# Patient Record
Sex: MALE | Race: WHITE | NOT HISPANIC OR LATINO
[De-identification: names, ages, dates, MRNs, and addresses within clinical notes are randomized per-mention and may not be internally consistent; named-entity substitution may affect disease eponyms.]

---

## 2024-02-20 PROBLEM — Z00.00 ENCOUNTER FOR PREVENTIVE HEALTH EXAMINATION: Status: ACTIVE | Noted: 2024-02-20

## 2024-02-22 ENCOUNTER — APPOINTMENT (OUTPATIENT)
Dept: UROLOGY | Facility: CLINIC | Age: 89
End: 2024-02-22

## 2024-06-10 ENCOUNTER — APPOINTMENT (OUTPATIENT)
Dept: ORTHOPEDIC SURGERY | Facility: CLINIC | Age: 89
End: 2024-06-10

## 2024-08-21 ENCOUNTER — APPOINTMENT (OUTPATIENT)
Dept: NEUROLOGY | Facility: HOSPITAL | Age: 89
End: 2024-08-21

## 2024-08-21 ENCOUNTER — RESULT REVIEW (OUTPATIENT)
Age: 89
End: 2024-08-21

## 2024-08-21 ENCOUNTER — INPATIENT (INPATIENT)
Facility: HOSPITAL | Age: 89
LOS: 15 days | Discharge: HOSPICE | DRG: 66 | End: 2024-09-06
Attending: INTERNAL MEDICINE | Admitting: PSYCHIATRY & NEUROLOGY
Payer: MEDICARE

## 2024-08-21 VITALS
OXYGEN SATURATION: 100 % | DIASTOLIC BLOOD PRESSURE: 74 MMHG | HEART RATE: 90 BPM | SYSTOLIC BLOOD PRESSURE: 153 MMHG | RESPIRATION RATE: 45 BRPM

## 2024-08-21 DIAGNOSIS — I63.9 CEREBRAL INFARCTION, UNSPECIFIED: ICD-10-CM

## 2024-08-21 LAB
A1C WITH ESTIMATED AVERAGE GLUCOSE RESULT: 5.4 % — SIGNIFICANT CHANGE UP (ref 4–5.6)
ANION GAP SERPL CALC-SCNC: 13 MMOL/L — SIGNIFICANT CHANGE UP (ref 5–17)
APTT BLD: 42.8 SEC — HIGH (ref 24.5–35.6)
BLD GP AB SCN SERPL QL: SIGNIFICANT CHANGE UP
BUN SERPL-MCNC: 28.3 MG/DL — HIGH (ref 8–20)
CALCIUM SERPL-MCNC: 8.5 MG/DL — SIGNIFICANT CHANGE UP (ref 8.4–10.5)
CHLORIDE SERPL-SCNC: 105 MMOL/L — SIGNIFICANT CHANGE UP (ref 96–108)
CHOLEST SERPL-MCNC: 175 MG/DL — SIGNIFICANT CHANGE UP
CK SERPL-CCNC: 75 U/L — SIGNIFICANT CHANGE UP (ref 30–200)
CO2 SERPL-SCNC: 19 MMOL/L — LOW (ref 22–29)
CREAT SERPL-MCNC: 1.64 MG/DL — HIGH (ref 0.5–1.3)
EGFR: 39 ML/MIN/1.73M2 — LOW
ESTIMATED AVERAGE GLUCOSE: 108 MG/DL — SIGNIFICANT CHANGE UP (ref 68–114)
GAS PNL BLDA: SIGNIFICANT CHANGE UP
GLUCOSE BLDC GLUCOMTR-MCNC: 157 MG/DL — HIGH (ref 70–99)
GLUCOSE SERPL-MCNC: 167 MG/DL — HIGH (ref 70–99)
HCT VFR BLD CALC: 29.2 % — LOW (ref 39–50)
HDLC SERPL-MCNC: 49 MG/DL — SIGNIFICANT CHANGE UP
HGB BLD-MCNC: 9.5 G/DL — LOW (ref 13–17)
INR BLD: 1.03 RATIO — SIGNIFICANT CHANGE UP (ref 0.85–1.18)
LIPID PNL WITH DIRECT LDL SERPL: 109 MG/DL — HIGH
MAGNESIUM SERPL-MCNC: 2 MG/DL — SIGNIFICANT CHANGE UP (ref 1.6–2.6)
MCHC RBC-ENTMCNC: 27.8 PG — SIGNIFICANT CHANGE UP (ref 27–34)
MCHC RBC-ENTMCNC: 32.5 GM/DL — SIGNIFICANT CHANGE UP (ref 32–36)
MCV RBC AUTO: 85.4 FL — SIGNIFICANT CHANGE UP (ref 80–100)
NON HDL CHOLESTEROL: 126 MG/DL — SIGNIFICANT CHANGE UP
NT-PROBNP SERPL-SCNC: 3080 PG/ML — HIGH (ref 0–300)
PHOSPHATE SERPL-MCNC: 4 MG/DL — SIGNIFICANT CHANGE UP (ref 2.4–4.7)
PLATELET # BLD AUTO: 179 K/UL — SIGNIFICANT CHANGE UP (ref 150–400)
POTASSIUM SERPL-MCNC: 4 MMOL/L — SIGNIFICANT CHANGE UP (ref 3.5–5.3)
POTASSIUM SERPL-SCNC: 4 MMOL/L — SIGNIFICANT CHANGE UP (ref 3.5–5.3)
PROTHROM AB SERPL-ACNC: 11.4 SEC — SIGNIFICANT CHANGE UP (ref 9.5–13)
RBC # BLD: 3.42 M/UL — LOW (ref 4.2–5.8)
RBC # FLD: 14.6 % — HIGH (ref 10.3–14.5)
SODIUM SERPL-SCNC: 137 MMOL/L — SIGNIFICANT CHANGE UP (ref 135–145)
TRIGL SERPL-MCNC: 87 MG/DL — SIGNIFICANT CHANGE UP
TROPONIN T, HIGH SENSITIVITY RESULT: 46 NG/L — SIGNIFICANT CHANGE UP (ref 0–51)
TSH SERPL-MCNC: 1.01 UIU/ML — SIGNIFICANT CHANGE UP (ref 0.27–4.2)
WBC # BLD: 12.92 K/UL — HIGH (ref 3.8–10.5)
WBC # FLD AUTO: 12.92 K/UL — HIGH (ref 3.8–10.5)

## 2024-08-21 PROCEDURE — 70460 CT HEAD/BRAIN W/DYE: CPT | Mod: 26

## 2024-08-21 PROCEDURE — 93010 ELECTROCARDIOGRAM REPORT: CPT

## 2024-08-21 PROCEDURE — 61645 PERQ ART M-THROMBECT &/NFS: CPT | Mod: LT

## 2024-08-21 PROCEDURE — 76377 3D RENDER W/INTRP POSTPROCES: CPT | Mod: 26

## 2024-08-21 PROCEDURE — 71045 X-RAY EXAM CHEST 1 VIEW: CPT | Mod: 26

## 2024-08-21 PROCEDURE — 71045 X-RAY EXAM CHEST 1 VIEW: CPT | Mod: 26,77

## 2024-08-21 PROCEDURE — 99291 CRITICAL CARE FIRST HOUR: CPT | Mod: FS

## 2024-08-21 PROCEDURE — 75710 ARTERY X-RAYS ARM/LEG: CPT | Mod: 26,59

## 2024-08-21 PROCEDURE — 70450 CT HEAD/BRAIN W/O DYE: CPT | Mod: 26,76

## 2024-08-21 PROCEDURE — 93306 TTE W/DOPPLER COMPLETE: CPT | Mod: 26

## 2024-08-21 PROCEDURE — 99232 SBSQ HOSP IP/OBS MODERATE 35: CPT

## 2024-08-21 RX ORDER — NICARDIPINE HCL 20 MG
5 CAPSULE ORAL
Qty: 40 | Refills: 0 | Status: DISCONTINUED | OUTPATIENT
Start: 2024-08-21 | End: 2024-08-22

## 2024-08-21 RX ORDER — AMLODIPINE BESYLATE 10 MG/1
5 TABLET ORAL DAILY
Refills: 0 | Status: DISCONTINUED | OUTPATIENT
Start: 2024-08-21 | End: 2024-08-22

## 2024-08-21 RX ORDER — CHLORHEXIDINE GLUCONATE 40 MG/ML
1 SOLUTION TOPICAL DAILY
Refills: 0 | Status: DISCONTINUED | OUTPATIENT
Start: 2024-08-21 | End: 2024-09-06

## 2024-08-21 RX ORDER — POLYETHYLENE GLYCOL 3350 17 G/17G
17 POWDER, FOR SOLUTION ORAL DAILY
Refills: 0 | Status: DISCONTINUED | OUTPATIENT
Start: 2024-08-21 | End: 2024-09-02

## 2024-08-21 RX ORDER — CHLORHEXIDINE GLUCONATE 40 MG/ML
15 SOLUTION TOPICAL EVERY 12 HOURS
Refills: 0 | Status: DISCONTINUED | OUTPATIENT
Start: 2024-08-21 | End: 2024-08-30

## 2024-08-21 RX ORDER — PANTOPRAZOLE SODIUM 40 MG
40 TABLET, DELAYED RELEASE (ENTERIC COATED) ORAL EVERY 12 HOURS
Refills: 0 | Status: DISCONTINUED | OUTPATIENT
Start: 2024-08-21 | End: 2024-09-06

## 2024-08-21 RX ORDER — SENNA 187 MG
2 TABLET ORAL AT BEDTIME
Refills: 0 | Status: DISCONTINUED | OUTPATIENT
Start: 2024-08-21 | End: 2024-09-02

## 2024-08-21 RX ORDER — TAMSULOSIN HYDROCHLORIDE 0.4 MG/1
0.4 CAPSULE ORAL AT BEDTIME
Refills: 0 | Status: DISCONTINUED | OUTPATIENT
Start: 2024-08-21 | End: 2024-09-02

## 2024-08-21 RX ORDER — SODIUM CHLORIDE 9 MG/ML
1000 INJECTION INTRAMUSCULAR; INTRAVENOUS; SUBCUTANEOUS
Refills: 0 | Status: DISCONTINUED | OUTPATIENT
Start: 2024-08-21 | End: 2024-08-21

## 2024-08-21 RX ORDER — PROPOFOL 10 MG/ML
10 INJECTION, EMULSION INTRAVENOUS
Qty: 1000 | Refills: 0 | Status: DISCONTINUED | OUTPATIENT
Start: 2024-08-21 | End: 2024-08-21

## 2024-08-21 RX ORDER — SODIUM CHLORIDE 9 MG/ML
250 INJECTION INTRAMUSCULAR; INTRAVENOUS; SUBCUTANEOUS ONCE
Refills: 0 | Status: DISCONTINUED | OUTPATIENT
Start: 2024-08-21 | End: 2024-08-21

## 2024-08-21 RX ORDER — SODIUM CHLORIDE 9 MG/ML
500 INJECTION INTRAMUSCULAR; INTRAVENOUS; SUBCUTANEOUS ONCE
Refills: 0 | Status: COMPLETED | OUTPATIENT
Start: 2024-08-21 | End: 2024-08-21

## 2024-08-21 RX ORDER — DEXMEDETOMIDINE HYDROCHLORIDE IN 0.9% SODIUM CHLORIDE 4 UG/ML
0.2 INJECTION INTRAVENOUS
Qty: 200 | Refills: 0 | Status: DISCONTINUED | OUTPATIENT
Start: 2024-08-21 | End: 2024-08-22

## 2024-08-21 RX ADMIN — SODIUM CHLORIDE 75 MILLILITER(S): 9 INJECTION INTRAMUSCULAR; INTRAVENOUS; SUBCUTANEOUS at 17:36

## 2024-08-21 RX ADMIN — CHLORHEXIDINE GLUCONATE 1 APPLICATION(S): 40 SOLUTION TOPICAL at 17:21

## 2024-08-21 RX ADMIN — Medication 25 MG/HR: at 14:30

## 2024-08-21 RX ADMIN — SODIUM CHLORIDE 75 MILLILITER(S): 9 INJECTION INTRAMUSCULAR; INTRAVENOUS; SUBCUTANEOUS at 04:50

## 2024-08-21 RX ADMIN — TAMSULOSIN HYDROCHLORIDE 0.4 MILLIGRAM(S): 0.4 CAPSULE ORAL at 21:53

## 2024-08-21 RX ADMIN — CHLORHEXIDINE GLUCONATE 15 MILLILITER(S): 40 SOLUTION TOPICAL at 06:04

## 2024-08-21 RX ADMIN — SODIUM CHLORIDE 500 MILLILITER(S): 9 INJECTION INTRAMUSCULAR; INTRAVENOUS; SUBCUTANEOUS at 06:35

## 2024-08-21 RX ADMIN — Medication 2 TABLET(S): at 21:53

## 2024-08-21 RX ADMIN — PROPOFOL 4.08 MICROGRAM(S)/KG/MIN: 10 INJECTION, EMULSION INTRAVENOUS at 06:03

## 2024-08-21 RX ADMIN — SODIUM CHLORIDE 75 MILLILITER(S): 9 INJECTION INTRAMUSCULAR; INTRAVENOUS; SUBCUTANEOUS at 17:37

## 2024-08-21 RX ADMIN — Medication 25 MG/HR: at 21:11

## 2024-08-21 RX ADMIN — Medication 25 MG/HR: at 06:03

## 2024-08-21 RX ADMIN — DEXMEDETOMIDINE HYDROCHLORIDE IN 0.9% SODIUM CHLORIDE 3.4 MICROGRAM(S)/KG/HR: 4 INJECTION INTRAVENOUS at 22:23

## 2024-08-21 RX ADMIN — CHLORHEXIDINE GLUCONATE 15 MILLILITER(S): 40 SOLUTION TOPICAL at 17:20

## 2024-08-21 RX ADMIN — Medication 40 MILLIGRAM(S): at 17:21

## 2024-08-21 NOTE — H&P ADULT - CRITICAL CARE ATTENDING COMMENT
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   Acute resp failure due to neurologic injury.  AS s/p TAVR, Afib (diagnosed in 2022, refused AC; EF at that time reported as 50%), LBBB, 1st degr AV block; HTN, HLD, CKD.  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  H/o Stage 3 colon cancer in 2009, had surgery; hemorrhoids, h/o LGIB.  Reportedly, with declining mental and physical condition over the last 2 years.    Plan:  - post thrombectomy neuro checks  - MRI brain  - stroke core measures  - hold any anti-thrombotics for now  - SBP goal 100-120 for now; obtain EKG, repeat trops, pro-BNP; Cardiology eval  - maintain Osats>92%, vent support  - monitor e-lytes, I/Os, re-send UA  - maintain -180, ISS  - DVT prophylaxis: SCDs only for now  - GOC discussion with family, full code      Pt is critically ill and at high risk of rapid deterioration/death due to abovementioned conditions.   Still requires critical care interventions - ongoing frequent evaluations, interventions and management adjustment by the Attending and ICU team, - and included review of relevant history, clinical examination, review of data and images, discussion of treatment with the multidisciplinary team and any consultants involved in this patient’s care as well as family discussion.

## 2024-08-21 NOTE — H&P ADULT - ASSESSMENT
89 yo M with PMH of colon cancer, htn, ?on Aspirin, p/w presents from [facility] with initial complaints of nausea/vomitting and dizziness s/p dinner, subsequently found to be altered followed by unresponsivenss and decorticate positoning. patient intubated, CTA concerning for basilar artery occlusion, patient  ransferred to [facility] for further medical care.     Plan:   PLAN:  Neuro:  - Q1 hour Neuro checks, Q1 hour Vitals  - Per [facility] chart, recommendations made against TNK administration by Dr. Robert (tele neurostroke)   - patient will be going for thrombectomy for evacuation of basilar occlusion   - CT head in AM   - Stat CT head if neurological decline.   - Propofol @ 20  for sedation.   - MRI  w/ and w/o given cancer history .  CV:  - SBP Goal 100-140   - Stroke core measure   - TTE   - LED   Pulm:  - intubated, cxr and abg to confirm ideal mechanical ventilation and confirm ETT placment.   GI:  - NPO   - malignancy w/u - CT CAP w/ contrast.   Gu:  - Thomas   - I&O Q1 hour  - NS@75 while NPO   - Monitor Electrolytes & Renal Function    Heme:  - Monitor H&H  - Chemical DVT prophylaxis: * Chemical DVT prophylaxis is contraindicated due to risk of bleeding  - Mechanical DVT Prophylaxis: Maintain B/L LE sequential compression devices  ID:  - Monitor WBC and Temperature	  Endo  - Monitor BGL, maintain <180  - Pending A1C, TSH

## 2024-08-21 NOTE — DISCHARGE NOTE NURSING/CASE MANAGEMENT/SOCIAL WORK - NSDCPEFALRISK_GEN_ALL_CORE
For information on Fall & Injury Prevention, visit: [email or web address] OR  [email or web address] OR  https://www.cdc.gov/steadi/patient.html

## 2024-08-21 NOTE — H&P ADULT - HISTORY OF PRESENT ILLNESS
89 Yo M with PMH of HTN, colon cancer, enlarged prostate transfer from [facility] from [facility] for altered mental status. LKW 7PM, Per chart, Patient initially complained of  Nausea,  vomiting and dizziness s/p dinner, later  found to be unresponsive and decorticate posturing in [facility]. 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 Patient   transferred to [facility] for NeuroIR intervention.     NIHSS performed at20-Aug-2024 22:30 @ [facility]     NIH 1a. Level of Consciousness(3) Responds only with reflex motor or autonomic effects or totally unresponsive, flaccid, and areflex   NIH 1b. LOC Questions(2) Answers neither question correctly   NIH 1c. LOC Commands(2) Performs neither task correctly   NIH 2. Best Gaze(2) Forced deviation, or total gaze paresis not overcome by the oculocephalic maneuver   NIH 3. Visual(3) Bilateral hemianopia (blind including cortical blindness)   NIH 4. Facial Palsy(0) Normal symmetrical movements   NIH 5a. Motor Arm, Left(3) No effort against gravity; limb falls   NIH 5b. Motor Arm, Right(3) No effort against gravity; limb falls   NIH 6a. Motor Leg, Left(3) No effort against gravity; leg falls to bed immediately   NIH 6b. Motor Leg, Right(3) No effort against gravity; leg falls to bed immediately   NIH 7. Limb Ataxia(0) Absent   NIH 8. Sensory(2) Severe to total sensory loss; patient is not aware of being touched in the face, arm, and leg   NIH 9. Best Language(3) Mute, global aphasia; no usable speech or auditory comprehension   NIH 10. Dysarthria(UN) Intubated or other physical barrier   NIH 11. Extinction and Inattention (formally neglect)(2) Profound darren-inattention/extinction more than 1 modality (1) Visual, tactile, auditory, spatial, or personal inattention or extinction to bilateral simultaneous stimulation in one of the sensory modalities    NIH Stroke Scale: Total31 30    NIHSS [id number] (severe stroke)

## 2024-08-21 NOTE — H&P ADULT - NSHPSOCIALHISTORY_GEN_ALL_CORE
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 Per son- who is a neurosurgeon, patient has mild memory loss but overall highly functioning. patient is a former smoker.

## 2024-08-21 NOTE — PATIENT PROFILE ADULT - FALL HARM RISK - HARM RISK INTERVENTIONS

## 2024-08-21 NOTE — CHART NOTE - NSCHARTNOTEFT_GEN_A_CORE
Neurointerventional Surgery Post Procedure Note    Procedure: Selective Cerebral Angiography     Pre- Procedure Diagnosis: basilar occlusion  Post- Procedure Diagnosis: basilar occlusion s/p angioplasty of L vert and thrombectomy of basilar artery, TICI 2B after 1 pass     : Dr. Olmos  Physician Assistant: Rachele Helton  Nurse: Bao Gómez   Anesthesiologist: Dr. Peña, VIRGILIO       Radiology Tech: Owen Shah  Fellow: Mike Prakash    Sheath:  7  Cymraes Sheath    I/Os: estimated blood loss less than 20cc,  IV fluids 700cc, Urine output 400cc, Contrast: Omnipaque 240  65 cc    Vitals:  BP  110/56   HR 85  Spo2  99%    Preliminary Report:  Under a 7 Cymraes short sheath via the left wrist under general anesthesia via left vertebral artery, a selective cerebral angiography  was performed and reveals basilar occlusion s/p angioplasty of L vert and thrombectomy of basilar artery, TICI 2B after 1 pass. ( Official note to follow).    Patient tolerated procedure well.  Patient remains hemodynamically stable, no change in neurological status compared to baseline.  Results were discussed with patient, patient's family and Neurosurgery.  Left wrist sheath  was discontinued using radial band with 10cc of air at 0420.   No active bleeding, no hematoma, no ecchymosis.     Patient transferred to neuro ICU in stable condition.

## 2024-08-21 NOTE — CONSULT NOTE ADULT - SUBJECTIVE AND OBJECTIVE BOX
Preliminary note, official note pending attending review/signature.                               [facility] Stroke Team  CC: nausea vomiting, unresponsiveness   HPI:  89 Yo M with PMH of HTN, colon cancer, and enlarged prostate was transfered from [facility] to [facility] on 8/21. Per chart patient initially complained of nausea,  vomiting and dizziness s/p dinner on 8/20. Was brought to [place] on 8/20 and was later found to be unresponsive and decorticate posturing in [place] ED. Patient intubated in ED. LKW was 19:00 on 8/20. 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 Patient  transferred to [facility] for NeuroIR intervention and taken for mechanical thrombectomy on 8/21. Stroke team called for consult.     PAST MEDICAL & SURGICAL HISTORY:  Colonic cancer  HTN (hypertension)    MEDICATIONS  (STANDING):  chlorhexidine 0.12% Liquid 15 milliLiter(s) Oral Mucosa every 12 hours  chlorhexidine 2% Cloths 1 Application(s) Topical daily  niCARdipine Infusion 5 mG/Hr (25 mL/Hr) IV Continuous <Continuous>  propofol Infusion 10 MICROgram(s)/kG/Min (4.08 mL/Hr) IV Continuous <Continuous>  sodium chloride 0.9% Bolus 500 milliLiter(s) IV Bolus once  sodium chloride 0.9% Bolus 250 milliLiter(s) IV Bolus once  sodium chloride 0.9%. 1000 milliLiter(s) (75 mL/Hr) IV Continuous <Continuous>    Allergies-Allergy Status Unknown    SOCIAL HISTORY:  no tob,   no alcohol   no drugs    FAMILY HISTORY:    ROS: 14 point ROS negative other than what is present in HPI or below    Vital Signs Last 24 Hrs  T(C): --  T(F): --  HR: 75 (21 Aug 2024 06:50) (68 - 99)  BP: 113/47 (21 Aug 2024 06:50) (97/50 - 153/74)  BP(mean): 64 (21 Aug 2024 06:50) (64 - 99)  RR: 13 (21 Aug 2024 06:50) (13 - 45)  SpO2: 100% (21 Aug 2024 06:50) (100% - 100%)      PENDING  Physical Exam:  General: No acute distress.   Detailed Neurologic Exam:    Mental status: The patient is awake and alert and has normal attention span.  The patient is fully oriented in 3 spheres. The patient is oriented to current events. The patient is able to name objects, follow commands, repeat sentences.    Cranial nerves: Pupils equal and react symmetrically to light. There is no visual field deficit to confrontation. Extraocular motion is full with no nystagmus. There is no ptosis. Facial sensation is intact. Facial musculature is symmetric. Palate elevates symmetrically. Tongue is midline.    Motor: There is normal bulk and tone.  There is no tremor.  Strength is 5/5 in the right arm and leg.   Strength is 5/5 in the left arm and leg.    Sensation: Intact to light touch and pin in 4 extremities    Cerebellar: There is no dysmetria on finger to nose testing.    Gait : deferred    RUST SS:  DATE: 8/21/24  TIME:  1A: Level of consciousness (0-3):   1B: Questions (0-2):   1C: Commands (0-2):   2: Gaze (0-2):   3: Visual fields (0-3):   4: Facial palsy (0-3):   MOTOR:  5A: Left arm motor drift (0-4):   5B: Right arm motor drift (0-4):   6A: Left leg motor drift (0-4):   6B: Right leg motor drift (0-4):   7: Limb ataxia (0-2):   SENSORY:  8: Sensation (0-2):   SPEECH:  9: Language (0-3):   10: Dysarthria (0-2):   EXTINCTION:  11: Extinction/inattention (0-2):     TOTAL SCORE:     prehospital mRS=      LABS:                         9.5    12.92 )-----------( 179      ( 21 Aug 2024 05:28 )             29.2       08-21    137  |  105  |  28.3<H>  ----------------------------<  167<H>  4.0   |  19.0<L>  |  1.64<H>    Ca    8.5      21 Aug 2024 05:28        PT/INR - ( 21 Aug 2024 05:28 )   PT: 11.4 sec;   INR: 1.03 ratio         PTT - ( 21 Aug 2024 05:28 )  PTT:42.8 sec    08-21 Chol 175  HDL 49 Trig 87    A1C: 5.4    RADIOLOGY & ADDITIONAL STUDIES (independently reviewed unless otherwise noted):  Head CT 8/20:  IMPRESSION:  No evidence of acute intracranial hemorrhage, midline shift or CT evidence  of acute territorial infarct.    CTA Head and Neck and CTP:   IMPRESSION:  CT PERFUSION: Perfusion imaging predicts a core infarct of 0 cc within the  bilateral cerebral hemispheres.? Based on the perfusion mismatch, there is a  predicted volume of 61 cc penumbra of tissue at risk.    These findings are nonspecific and may be artifactual in nature. MRI  suggested for further evaluation.    CTA COW: Lack of opacification of the mid to distal basilar artery, this  may be due to poor contrast opacification, however, suspicious for  high-grade stenosis/occlusion.    CTA NECK: Calcified plaque. Patent, ECAs, ICAs, no hemodynamically  significant stenosis at ICA origins by NASCET criteria.    Lack of opacification of the proximal left vertebral artery, this may be due  to poor contrast opacification, however, high-grade stenosis/occlusion is  not excluded.    Endotracheal tube with its distal tip above the level of the judson.    Nonspecific thickening of the esophagus.         Preliminary note, official note pending attending review/signature.                               [facility] Stroke Team  CC: nausea vomiting, unresponsiveness   HPI:  89 Yo M with PMH of HTN, colon cancer, and enlarged prostate was transferred from [facility] to [facility] on 8/21. Per chart patient initially complained of nausea,  vomiting and dizziness s/p dinner on 8/20. Was brought to [place] on 8/20 and was found to be unresponsive, apneic breathing, and decorticate posturing in [place] ED. Patient intubated in ED. LKW was 20:00 on 8/20. As per chart review son stated that about 4 months ago patient had a syncopal episode and received TPA and had some hematuria, but eventual workup showed no evidence of stroke. 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 Patient  transferred to [facility] for NeuroIR intervention and taken for mechanical thrombectomy on 8/21. Stroke team called for consult.     PAST MEDICAL & SURGICAL HISTORY:  Colonic cancer  HTN (hypertension)    MEDICATIONS  (STANDING):  chlorhexidine 0.12% Liquid 15 milliLiter(s) Oral Mucosa every 12 hours  chlorhexidine 2% Cloths 1 Application(s) Topical daily  niCARdipine Infusion 5 mG/Hr (25 mL/Hr) IV Continuous <Continuous>  propofol Infusion 10 MICROgram(s)/kG/Min (4.08 mL/Hr) IV Continuous <Continuous>  sodium chloride 0.9% Bolus 500 milliLiter(s) IV Bolus once  sodium chloride 0.9% Bolus 250 milliLiter(s) IV Bolus once  sodium chloride 0.9%. 1000 milliLiter(s) (75 mL/Hr) IV Continuous <Continuous>    Allergies-Allergy Status Unknown    SOCIAL HISTORY:  no tob,   no alcohol   no drugs    FAMILY HISTORY:    ROS: 14 point ROS negative other than what is present in HPI or below    Vital Signs Last 24 Hrs  T(C): --  T(F): --  HR: 75 (21 Aug 2024 06:50) (68 - 99)  BP: 113/47 (21 Aug 2024 06:50) (97/50 - 153/74)  BP(mean): 64 (21 Aug 2024 06:50) (64 - 99)  RR: 13 (21 Aug 2024 06:50) (13 - 45)  SpO2: 100% (21 Aug 2024 06:50) (100% - 100%)    Physical Exam:  General: Patient intubated. Family at bedside.   Detailed Neurologic Exam:    Mental status: The patient not awake and keeps eyes closed throughout exam. Not oriented to time, self, or place. No verbal output.     Cranial nerves: Pupils pinpoint but minimally reactive to light. No facial asymmetry. Right sided neglect.      Motor: There is normal bulk and tone.  There is no tremor.  LUE: antigravity for 10 seconds. 4/5 strength   LLE: movement within bed and withdraws to noxious stimuli   RUE: no movement   RLE: movement within bed and withdraws to noxious stimuli       Sensation: intact to noxious stimuli in RLE, LUE and LLE. no sensation to noxious stimuli in RUE.     Cerebellar: There is no dysmetria on finger to nose testing.    Gait : deferred    NIH SS:  DATE: 8/21/24  TIME:  1A: Level of consciousness (0-3):   1B: Questions (0-2):   1C: Commands (0-2):   2: Gaze (0-2):   3: Visual fields (0-3):   4: Facial palsy (0-3):   MOTOR:  5A: Left arm motor drift (0-4):   5B: Right arm motor drift (0-4):   6A: Left leg motor drift (0-4):   6B: Right leg motor drift (0-4):   7: Limb ataxia (0-2):   SENSORY:  8: Sensation (0-2):   SPEECH:  9: Language (0-3):   10: Dysarthria (0-2):   EXTINCTION:  11: Extinction/inattention (0-2):     TOTAL SCORE:     prehospital mRS=      LABS:                         9.5    12.92 )-----------( 179      ( 21 Aug 2024 05:28 )             29.2       08-21    137  |  105  |  28.3<H>  ----------------------------<  167<H>  4.0   |  19.0<L>  |  1.64<H>    Ca    8.5      21 Aug 2024 05:28        PT/INR - ( 21 Aug 2024 05:28 )   PT: 11.4 sec;   INR: 1.03 ratio         PTT - ( 21 Aug 2024 05:28 )  PTT:42.8 sec    08-21 Chol 175  HDL 49 Trig 87    A1C: 5.4    RADIOLOGY & ADDITIONAL STUDIES (independently reviewed unless otherwise noted):  CT Head No Cont (08.21.24 @ 08:09)   IMPRESSION:  Increased attenuation within the mikaela, new compared to prior imaging.   Finding likely compatible with a developing hemorrhagic conversion of   patient's known infarct and/or contrast staining. Recommend close   interval imaging for further evaluation.    Head CT 8/20:  IMPRESSION:  No evidence of acute intracranial hemorrhage, midline shift or CT evidence  of acute territorial infarct.    CTA Head and Neck and CTP:   IMPRESSION:  CT PERFUSION: Perfusion imaging predicts a core infarct of 0 cc within the  bilateral cerebral hemispheres.? Based on the perfusion mismatch, there is a  predicted volume of 61 cc penumbra of tissue at risk.    These findings are nonspecific and may be artifactual in nature. MRI  suggested for further evaluation.    CTA COW: Lack of opacification of the mid to distal basilar artery, this  may be due to poor contrast opacification, however, suspicious for  high-grade stenosis/occlusion.    CTA NECK: Calcified plaque. Patent, ECAs, ICAs, no hemodynamically  significant stenosis at ICA origins by NASCET criteria.    Lack of opacification of the proximal left vertebral artery, this may be due  to poor contrast opacification, however, high-grade stenosis/occlusion is  not excluded.    Endotracheal tube with its distal tip above the level of the judson.    Nonspecific thickening of the esophagus.         Preliminary note, official note pending attending review/signature.                               [facility] Stroke Team  CC: nausea vomiting, unresponsiveness   HPI:  89 Yo M with PMH of HTN, colon cancer, and enlarged prostate was transferred from [facility] to [facility] on 8/21. Per chart patient initially complained of nausea,  vomiting and dizziness s/p dinner on 8/20. Was brought to [place] on 8/20 and was found to be unresponsive, apneic breathing, and decorticate posturing in [place] ED. Patient intubated in ED. LKW was 20:00 on 8/20. As per chart review son stated that about 4 months ago patient had a syncopal episode and received TPA and had some hematuria, but eventual workup showed no evidence of stroke. 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 Patient  transferred to [facility] for NeuroIR intervention and taken for mechanical thrombectomy on 8/21. Stroke team called for consult.     PAST MEDICAL & SURGICAL HISTORY:  Colonic cancer  HTN (hypertension)    MEDICATIONS  (STANDING):  chlorhexidine 0.12% Liquid 15 milliLiter(s) Oral Mucosa every 12 hours  chlorhexidine 2% Cloths 1 Application(s) Topical daily  niCARdipine Infusion 5 mG/Hr (25 mL/Hr) IV Continuous <Continuous>  propofol Infusion 10 MICROgram(s)/kG/Min (4.08 mL/Hr) IV Continuous <Continuous>  sodium chloride 0.9% Bolus 500 milliLiter(s) IV Bolus once  sodium chloride 0.9% Bolus 250 milliLiter(s) IV Bolus once  sodium chloride 0.9%. 1000 milliLiter(s) (75 mL/Hr) IV Continuous <Continuous>    Allergies-Allergy Status Unknown    SOCIAL HISTORY:  no tob,   no alcohol   no drugs    FAMILY HISTORY:    ROS: 14 point ROS negative other than what is present in HPI or below    Vital Signs Last 24 Hrs  T(C): --  T(F): --  HR: 75 (21 Aug 2024 06:50) (68 - 99)  BP: 113/47 (21 Aug 2024 06:50) (97/50 - 153/74)  BP(mean): 64 (21 Aug 2024 06:50) (64 - 99)  RR: 13 (21 Aug 2024 06:50) (13 - 45)  SpO2: 100% (21 Aug 2024 06:50) (100% - 100%)    Physical Exam:  General: Patient intubated. Family at bedside.   Detailed Neurologic Exam:    Mental status: The patient not awake, however opens eyes to voice. Not oriented to time, self, or place. No verbal output. does not follow commands    Cranial nerves: Pupils pinpoint but minimally reactive to light. No facial asymmetry. Right sided neglect. Right gaze palsy. Does not blink to threat in right peripheral field.     Motor: There is normal bulk and tone.  There is no tremor.  LUE: antigravity for 10 seconds. 4/5 strength   LLE: movement within bed and withdraws to noxious stimuli   RUE: no movement   RLE: movement within bed and withdraws to noxious stimuli       Sensation: intact to noxious stimuli in RLE, LUE and LLE. no sensation to noxious stimuli in RUE.     Cerebellar: There is no dysmetria on finger to nose testing.    Gait : deferred    NIH SS:  DATE: 8/21/24  TIME: 10:30 am   1A: Level of consciousness (0-3): 2  1B: Questions (0-2): 2  1C: Commands (0-2): 2  2: Gaze (0-2): 1  3: Visual fields (0-3): 1  4: Facial palsy (0-3): 0  MOTOR:  5A: Left arm motor drift (0-4): 0  5B: Right arm motor drift (0-4): 4  6A: Left leg motor drift (0-4): 2  6B: Right leg motor drift (0-4): 2  7: Limb ataxia (0-2): 0  SENSORY:  8: Sensation (0-2): 2  SPEECH:  9: Language (0-3): 3  10: Dysarthria (0-2): 2  EXTINCTION:  11: Extinction/inattention (0-2): 1    TOTAL SCORE: 22    prehospital mRS= unknown at this time       LABS:                         9.5    12.92 )-----------( 179      ( 21 Aug 2024 05:28 )             29.2       08-21    137  |  105  |  28.3<H>  ----------------------------<  167<H>  4.0   |  19.0<L>  |  1.64<H>    Ca    8.5      21 Aug 2024 05:28        PT/INR - ( 21 Aug 2024 05:28 )   PT: 11.4 sec;   INR: 1.03 ratio         PTT - ( 21 Aug 2024 05:28 )  PTT:42.8 sec    08-21 Chol 175  HDL 49 Trig 87    A1C: 5.4    RADIOLOGY & ADDITIONAL STUDIES (independently reviewed unless otherwise noted):  CT Head No Cont (08.21.24 @ 08:09)   IMPRESSION:  Increased attenuation within the mikaela, new compared to prior imaging.   Finding likely compatible with a developing hemorrhagic conversion of   patient's known infarct and/or contrast staining. Recommend close   interval imaging for further evaluation.    Head CT 8/20:  IMPRESSION:  No evidence of acute intracranial hemorrhage, midline shift or CT evidence  of acute territorial infarct.    CTA Head and Neck and CTP:   IMPRESSION:  CT PERFUSION: Perfusion imaging predicts a core infarct of 0 cc within the  bilateral cerebral hemispheres.? Based on the perfusion mismatch, there is a  predicted volume of 61 cc penumbra of tissue at risk.    These findings are nonspecific and may be artifactual in nature. MRI  suggested for further evaluation.    CTA COW: Lack of opacification of the mid to distal basilar artery, this  may be due to poor contrast opacification, however, suspicious for  high-grade stenosis/occlusion.    CTA NECK: Calcified plaque. Patent, ECAs, ICAs, no hemodynamically  significant stenosis at ICA origins by NASCET criteria.    Lack of opacification of the proximal left vertebral artery, this may be due  to poor contrast opacification, however, high-grade stenosis/occlusion is  not excluded.    Endotracheal tube with its distal tip above the level of the judson.    Nonspecific thickening of the esophagus.                                      [facility] Stroke Team  CC: nausea vomiting, unresponsiveness   HPI:  89 Yo M with PMH of HTN, colon cancer, and enlarged prostate was transferred from [facility] to [facility] on 8/21. Per chart patient initially complained of nausea,  vomiting and dizziness s/p dinner on 8/20. Was brought to [place] on 8/20 and was found to be unresponsive, apneic breathing, and decorticate posturing in [place] ED. Patient intubated in ED. LKW was 20:00 on 8/20. As per chart review son stated that about 4 months ago patient had a syncopal episode and received TPA and had some hematuria, but eventual workup showed no evidence of stroke. 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 Patient  transferred to [facility] for NeuroIR intervention and taken for mechanical thrombectomy on 8/21. Stroke team called for consult.     PAST MEDICAL & SURGICAL HISTORY:  Colonic cancer  HTN (hypertension)    MEDICATIONS  (STANDING):  chlorhexidine 0.12% Liquid 15 milliLiter(s) Oral Mucosa every 12 hours  chlorhexidine 2% Cloths 1 Application(s) Topical daily  niCARdipine Infusion 5 mG/Hr (25 mL/Hr) IV Continuous <Continuous>  propofol Infusion 10 MICROgram(s)/kG/Min (4.08 mL/Hr) IV Continuous <Continuous>  sodium chloride 0.9% Bolus 500 milliLiter(s) IV Bolus once  sodium chloride 0.9% Bolus 250 milliLiter(s) IV Bolus once  sodium chloride 0.9%. 1000 milliLiter(s) (75 mL/Hr) IV Continuous <Continuous>    Allergies-Allergy Status Unknown    SOCIAL HISTORY:  no tob,   no alcohol   no drugs    FAMILY HISTORY:    ROS: 14 point ROS negative other than what is present in HPI or below    Vital Signs Last 24 Hrs  T(C): --  T(F): --  HR: 75 (21 Aug 2024 06:50) (68 - 99)  BP: 113/47 (21 Aug 2024 06:50) (97/50 - 153/74)  BP(mean): 64 (21 Aug 2024 06:50) (64 - 99)  RR: 13 (21 Aug 2024 06:50) (13 - 45)  SpO2: 100% (21 Aug 2024 06:50) (100% - 100%)    Physical Exam:  General: Patient intubated. Family at bedside.   Detailed Neurologic Exam:    Mental status: The patient not awake, however opens eyes to voice. Not oriented to time, self, or place. No verbal output. does not follow commands    Cranial nerves: Pupils pinpoint but minimally reactive to light. No facial asymmetry. Does not blink to threat in right peripheral field.     Motor: There is normal bulk and tone.  There is no tremor.  LUE: antigravity for 10 seconds. 4/5 strength   LLE: movement within bed and withdraws to noxious stimuli   RUE: no movement   RLE: movement within bed and withdraws to noxious stimuli       Sensation: intact to noxious stimuli in RLE, LUE and LLE. no sensation to noxious stimuli in RUE.     Cerebellar: There is no dysmetria on finger to nose testing.    Gait : deferred    NIH SS:  DATE: 8/21/24  TIME: 10:30 am   1A: Level of consciousness (0-3): 2  1B: Questions (0-2): 2  1C: Commands (0-2): 2  2: Gaze (0-2): 1  3: Visual fields (0-3): 1  4: Facial palsy (0-3): 0  MOTOR:  5A: Left arm motor drift (0-4): 0  5B: Right arm motor drift (0-4): 4  6A: Left leg motor drift (0-4): 2  6B: Right leg motor drift (0-4): 2  7: Limb ataxia (0-2): 0  SENSORY:  8: Sensation (0-2): 2  SPEECH:  9: Language (0-3): 3  10: Dysarthria (0-2): 2  EXTINCTION:  11: Extinction/inattention (0-2): 1    TOTAL SCORE: 22    prehospital mRS= unknown at this time       LABS:                         9.5    12.92 )-----------( 179      ( 21 Aug 2024 05:28 )             29.2       08-21    137  |  105  |  28.3<H>  ----------------------------<  167<H>  4.0   |  19.0<L>  |  1.64<H>    Ca    8.5      21 Aug 2024 05:28        PT/INR - ( 21 Aug 2024 05:28 )   PT: 11.4 sec;   INR: 1.03 ratio         PTT - ( 21 Aug 2024 05:28 )  PTT:42.8 sec    08-21 Chol 175  HDL 49 Trig 87    A1C: 5.4    RADIOLOGY & ADDITIONAL STUDIES (independently reviewed unless otherwise noted):  CT Head No Cont (08.21.24 @ 08:09)   IMPRESSION:  Increased attenuation within the mikaela, new compared to prior imaging.   Finding likely compatible with a developing hemorrhagic conversion of   patient's known infarct and/or contrast staining. Recommend close   interval imaging for further evaluation.    Head CT 8/20:  IMPRESSION:  No evidence of acute intracranial hemorrhage, midline shift or CT evidence  of acute territorial infarct.    CTA Head and Neck and CTP:   IMPRESSION:  CT PERFUSION: Perfusion imaging predicts a core infarct of 0 cc within the  bilateral cerebral hemispheres.? Based on the perfusion mismatch, there is a  predicted volume of 61 cc penumbra of tissue at risk.    These findings are nonspecific and may be artifactual in nature. MRI  suggested for further evaluation.    CTA COW: Lack of opacification of the mid to distal basilar artery, this  may be due to poor contrast opacification, however, suspicious for  high-grade stenosis/occlusion.    CTA NECK: Calcified plaque. Patent, ECAs, ICAs, no hemodynamically  significant stenosis at ICA origins by NASCET criteria.    Lack of opacification of the proximal left vertebral artery, this may be due  to poor contrast opacification, however, high-grade stenosis/occlusion is  not excluded.    Endotracheal tube with its distal tip above the level of the judson.    Nonspecific thickening of the esophagus.

## 2024-08-21 NOTE — AIRWAY PLACEMENT NOTE ADULT - AIRWAY COMMENTS:
Pt received and transported from [facility] lab intubated on transport vent to Neuro ICU secondary to code AYANNA

## 2024-08-21 NOTE — CHART NOTE - NSCHARTNOTEFT_GEN_A_CORE
Neurointerventional Surgery  Pre-Procedure Note       HPI:  89M with PMH HTN, colon cancer who presented for thrombectomy. Patient resides at assisted living, last known well 7pm. Patient was eating dinner, vomited afterwards and became unresponsive. Taken to [facility], code stroke initiated, intubated for airway protection. CTA showed basilar occlusion. No TNK due to out of window. Patient transferred to [facility] for mechanical thrombectomy. NIH 37, MRS unknown.       Allergies: Allergy Status Unknown      PAST MEDICAL & SURGICAL HISTORY:  Colonic cancer  HTN (hypertension)      Physical Exam:  Constitutional: intubated  Neuro  * Mental Status:  no EO, not following commands, intubated   * Cranial Nerves: PERRL, no gaze, + cough/gag  * Motor: b/l UE extensor posturing, b/l LE WD R>L  * Sensory: decreased sensation throughout   * Reflexes: not assessed   Cardiovascular: irregular rate and rhythm   Eyes: See neurologic examination with detailed examination of eyes.  ENT: No JVD, Trachea Midline  Respiratory: intubated   Gastrointestinal: Soft, nontender, nondistended.  Genitourinary: [ x ] Thomas, [ ] No Thomas.   Musculoskeletal: No muscle wasting noted, (See neurologic assessment for full muscle strength assessment)  Skin:  no wounds, no redness, no abrasions noted  Hematologic / Lymph / Immunologic: No bleeding from IV sites or wounds      NIH SS:  DATE: 8/21/24   TIME: 0251  1A: Level of consciousness (0-3): 3  1B: Questions (0-2): 2  1C: Commands (0-2): 2  2: Gaze (0-2): 0  3: Visual fields (0-3): 3  4: Facial palsy (0-3): 3  MOTOR:  5A: Left arm motor drift (0-4): 3  5B: Right arm motor drift (0-4): 3  6A: Left leg motor drift (0-4): 3  6B: Right leg motor drift (0-4): 3  7: Limb ataxia (0-2): 0  SENSORY:  8: Sensation (0-2): 2  SPEECH:  9: Language (0-3): 3  10: Dysarthria (0-2): x  EXTINCTION:  11: Extinction/inattention (0-2): 2    TOTAL SCORE: 32      Labs:   From [facility]       Assessment/Plan:   This is a 89y  year old Male  presents with basilar occlusion. Patient presents to neuro-IR for mechanical thrombectomy.     Procedure, goals, risks, benefits and alternatives  were discussed with patient's son.  All questions were answered.  Risks include but are not limited to stroke, vessel injury, hemorrhage, and or groin hematoma.  Patient's son demonstrates understanding  of all risks involved with this procedure and wishes to continue.   Appropriate  consent was obtained from patient's son and consent is in the patient's chart.

## 2024-08-21 NOTE — PATIENT PROFILE ADULT - HOME ACCESSIBILITY CONCERNS
Patient needs a letter stating she is prescribed Methadone and Fioricet by Dr Suero, she will be having a drug screening for employment please advise   none

## 2024-08-21 NOTE — PROGRESS NOTE ADULT - ASSESSMENT
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   - POD 0  - Exam improved from pre procedure  - CTH with possible L pontine hemorrhage vs contrast    Plan:  - Discussed with Dr. Olmos  - Post thrombectomy neuro checks  - SBP goal 100-120 due to possible hemorrhage   - Repeat CTH for stability  - DVT prophylaxis: SCDs only  - Hold aspirin in setting of possible hemorrhage  - Stroke neurology following  - Further care per primary team  - No further neuro IR needs at this time, please re-call as needed

## 2024-08-21 NOTE — PROGRESS NOTE ADULT - SUBJECTIVE AND OBJECTIVE BOX
Patient is a 89y old  Male who presents with a chief complaint of Stroke workup (21 Aug 2024 08:05)    HPI:  89 Yo M with PMH of HTN, colon cancer, enlarged prostate transfer from [facility] from [facility] for altered mental status. LKW 7PM, Per chart, Patient initially complained of  Nausea,  vomiting and dizziness s/p dinner, later  found to be unresponsive and decorticate posturing in [place] ED. 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 Patient   transferred to [facility] for NeuroIR intervention. (21 Aug 2024 03:06)      Interval history:  Patient seen and examined by neuro IR team. Patient POD0 from mechanical thrombectomy. Repeat CTH this am shows pontine hemorrhage vs contrast, decreased in density but increased in size. Remains intubated, but exam improved. No acute complaints reported.       Vital Signs Last 24 Hrs  T(C): 36 (21 Aug 2024 11:00), Max: 36 (21 Aug 2024 11:00)  T(F): 96.8 (21 Aug 2024 11:00), Max: 96.8 (21 Aug 2024 11:00)  HR: 75 (21 Aug 2024 11:00) (47 - 99)  BP: 116/49 (21 Aug 2024 11:00) (89/47 - 153/74)  BP(mean): 69 (21 Aug 2024 11:00) (59 - 99)  RR: 13 (21 Aug 2024 11:00) (10 - 45)  SpO2: 100% (21 Aug 2024 11:00) (99% - 100%)    Parameters below as of 21 Aug 2024 08:00  Patient On (Oxygen Delivery Method): ventilator    O2 Concentration (%): 30      Physical Exam:  Constitutional: intubated   Neuro  * Mental Status: Eo to voice, not following commands, intubated   * Cranial Nerves: PERRL, no gaze deviation, + cough/gag, + overbreathing   * Motor: LUE/LLE AG spontaneously, RUE/RLE WD   * Sensory: decreased sensation R side   * Reflexes: not assessed   Wrist: soft, no hematoma, mild ecchymoses      LABS:                        9.5    12.92 )-----------( 179      ( 21 Aug 2024 05:28 )             29.2     08-21    137  |  105  |  28.3<H>  ----------------------------<  167<H>  4.0   |  19.0<L>  |  1.64<H>    Ca    8.5      21 Aug 2024 05:28  Phos  4.0     08-21  Mg     2.0     08-21    PT/INR - ( 21 Aug 2024 05:28 )   PT: 11.4 sec;   INR: 1.03 ratio    PTT - ( 21 Aug 2024 05:28 )  PTT:42.8 sec      Medications:  MEDICATIONS  (STANDING):  chlorhexidine 0.12% Liquid 15 milliLiter(s) Oral Mucosa every 12 hours  chlorhexidine 2% Cloths 1 Application(s) Topical daily  niCARdipine Infusion 5 mG/Hr (25 mL/Hr) IV Continuous <Continuous>  propofol Infusion 10 MICROgram(s)/kG/Min (4.08 mL/Hr) IV Continuous <Continuous>  sodium chloride 0.9%. 1000 milliLiter(s) (75 mL/Hr) IV Continuous <Continuous>    MEDICATIONS  (PRN):      RADIOLOGY & ADDITIONAL STUDIES:  < from: CT Head No Cont (08.21.24 @ 08:09) >  IMPRESSION:  Increased attenuation within the mikaela, new compared to prior imaging.   Finding likely compatible with a developing hemorrhagic conversion of   patient's known infarct and/or contrast staining. Recommend close   interval imaging for further evaluation.    --- End of Report ---  ALAN VAN MD; Attending Radiologist  This document has been electronically signed. Aug 21 2024  8:17AM    < end of copied text >

## 2024-08-21 NOTE — DISCHARGE NOTE NURSING/CASE MANAGEMENT/SOCIAL WORK - PATIENT PORTAL LINK FT
You can access the FollowMyHealth Patient Portal offered by [facility] by registering at the following website: [email or web address]. By joining [email or web address]’s FollowMyHealth portal, you will also be able to view your health information using other applications (apps) compatible with our system.

## 2024-08-21 NOTE — H&P ADULT - NSHPPHYSICALEXAM_GEN_ALL_CORE
Physical Exam:  Constitutional: intubated  Neuro  * Mental Status:  no EO, not following commands, intubated   * Cranial Nerves: PERRL, no gaze, + cough/gag  * Motor: b/l UE extensor posturing, b/l LE WD R>L  * Sensory: decreased sensation throughout   Cardiovascular: irregular rate and rhythm   Eyes: See neurologic examination with detailed examination of eyes.  ENT: No JVD, Trachea Midline  Respiratory: intubated   Gastrointestinal: Soft, nontender, nondistended.  Genitourinary: [ x ] Thomas, [ ] No Thomas.   Hematologic / Lymph / Immunologic: No bleeding from IV sites or wounds

## 2024-08-21 NOTE — CONSULT NOTE ADULT - ASSESSMENT
INCOMPLETE  ASSESSMENT: 89 Yo M with PMH of HTN, colon cancer, and enlarged prostate was transfered from [facility] to [facility] on 8/21. Per chart patient initially complained of nausea,  vomiting and dizziness s/p dinner on 8/20. Was brought to [place] on 8/20 and was later found to be unresponsive and decorticate posturing in [place] ED. Patient intubated in ED. LKW was 19:00 on 8/20. 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 Patient  transferred to [facility] for NeuroIR intervention and taken for mechanical thrombectomy on 8/21.    Etiology at this time embolic stroke of underdetermined source.     NEURO:   -Neurologically ---   -Continue close monitoring for neurologic deterioration    - Stroke neuro checks q 1 hour as per neuro ICU   -SBP goal as per neuro ICU/neuro IR    -ANTITHROMBOTIC THERAPY: on hold at this time. will be dependent on further neuro imaging   -titrate statin to LDL goal less than 70. . Would initiate high intensity statin when dysphagia screen is passed   Obtain MRI Brain w/o  -Dysphagia screen: pending   -Physical therapy/OT/Speech eval/treatment.   -obtain  TTE, cardiac monitoring w/ telemetry for now, further evaluation pending findings of noted workup  , +/- ILR             - patient should have all age and risk appropriate malignancy screenings with PCP or sooner if clinically suspected    -DVT ppx: Heparin s.c [] LMWH [] SCD[x]  - chemoppx on hold at this time. will be dependent on further neuro imaging   -maintain adequate hydration    -Na Goal: 135-145   -monitor for si/sx of infection   -Stroke education  -Further care per primary team     OTHER:  condition and plan of care d/w patient, questions and concerns addressed.     DISPOSITION: Rehab or home depending on PT eval once stable and workup is complete      CORE MEASURES:        Admission NIHSS: 31     Tenecteplase : [] YES [x] NO      LDL/A1C: 109/5.4     Depression Screen- if depression hx and/or present      Statin Therapy: as noted      Dysphagia Screen: [] PASS [] FAIL- pending      Smoking [] YES [] NO      Afib [] YES [] NO     Stroke Education [] YES [] NO- pending    ASSESSMENT: 89 Yo M with PMH of HTN, colon cancer, and enlarged prostate was transferred from [facility] to [facility] on 8/21. Per chart patient initially complained of nausea,  vomiting and dizziness s/p dinner on 8/20. Was brought to [place] on 8/20 and was found to be unresponsive, apneic breathing, and decorticate posturing in [place] ED. Patient intubated in ED. LKW was 20:00 on 8/20. As per chart review son stated that about 4 months ago patient had a syncopal episode and received TPA and had some hematuria, but eventual workup showed no evidence of stroke. 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 high grade stenosis. Also noted high grade stenosis vs. occlusion of the proximal left vertebral artery. Patient transferred to [facility] for NeuroIR intervention and taken for mechanical thrombectomy on 8/21. 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     NEURO: 2B  -Neurologically intubated, therefore limited exam, however noted with right hemiparesis.   -Repeat head CT revealed increased attenuation within the mikaela, new compared to prior imaging. Finding likely compatible with a developing hemorrhagic conversion of  known infarct and/or contrast staining. Would suggest head CT in AM.  -Continue close monitoring for neurologic deterioration    - Stroke neuro checks q 1 hour as per neuro ICU   -SBP goal as per neuro ICU/neuro IR    -ANTITHROMBOTIC THERAPY: on hold at this time. will be dependent on further neuro imaging   -titrate statin to LDL goal less than 70. . Would initiate high intensity statin when dysphagia screen is passed   Obtain MRI Brain w/o  -Dysphagia screen: pending   -Physical therapy/OT/Speech eval/treatment.   -obtain  TTE, cardiac monitoring w/ telemetry for now, further evaluation pending findings of noted workup  , +/- ILR             - patient should have all age and risk appropriate malignancy screenings with PCP or sooner if clinically suspected    -DVT ppx: Heparin s.c [] LMWH [] SCD[x]  - chemoppx on hold at this time. will be dependent on further neuro imaging   -maintain adequate hydration    -Na Goal: 135-145   -monitor for si/sx of infection   -Stroke education  -Further care per primary team     OTHER:  condition and plan of care d/w patient, questions and concerns addressed.     DISPOSITION: Rehab or home depending on PT eval once stable and workup is complete      CORE MEASURES:        Admission NIHSS: 31     Tenecteplase : [] YES [x] NO      LDL/A1C: 109/5.4     Depression Screen- if depression hx and/or present      Statin Therapy: as noted      Dysphagia Screen: [] PASS [] FAIL- pending      Smoking [] YES [] NO      Afib [] YES [] NO     Stroke Education [] YES [] NO- pending    ASSESSMENT: 89 Yo M with PMH of HTN, colon cancer, and enlarged prostate was transferred from [facility] to [facility] on 8/21. Per chart patient initially complained of nausea,  vomiting and dizziness s/p dinner on 8/20. Was brought to [place] on 8/20 and was found to be unresponsive, apneic breathing, and decorticate posturing in [place] ED. Patient intubated in ED. LKW was 20:00 on 8/20. As per chart review son stated that about 4 months ago patient had a syncopal episode and received TPA and had some hematuria, but eventual workup showed no evidence of stroke. 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 high grade stenosis. Also noted high grade stenosis vs. occlusion of the proximal left vertebral artery. Patient transferred to [facility] for NeuroIR intervention and taken for mechanical thrombectomy on 8/21. TICI 2B.    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     NEURO:   -Neurologically intubated, therefore limited exam, however noted with right hemiparesis and right sided neglect.   -Repeat head CT revealed increased attenuation within the mikaela, new compared to prior imaging. Finding likely compatible with a developing hemorrhagic conversion of  known infarct and/or contrast staining. Would suggest head CT in AM.  -Continue close monitoring for neurologic deterioration    - Stroke neuro checks q 1 hour as per neuro ICU   -SBP goal as per neuro ICU/neuro IR    -ANTITHROMBOTIC THERAPY: on hold at this time. will be dependent on further neuro imaging   -titrate statin to LDL goal less than 70. . Would initiate high intensity statin when dysphagia screen is passed   Obtain MRI Brain w/o  -Dysphagia screen: pending   -Physical therapy/OT/Speech eval/treatment.   -obtain  TTE, cardiac monitoring w/ telemetry for now, further evaluation pending findings of noted workup  , +/- ILR             - patient should have all age and risk appropriate malignancy screenings with PCP or sooner if clinically suspected    -DVT ppx: Heparin s.c [] LMWH [] SCD[x]  - chemoppx on hold at this time. will be dependent on further neuro imaging   -maintain adequate hydration    -Na Goal: 135-145   -monitor for si/sx of infection   -Stroke education  -Further care per primary team     OTHER:  condition and plan of care d/w patient, questions and concerns addressed.     DISPOSITION: Rehab or home depending on PT eval once stable and workup is complete      CORE MEASURES:        Admission NIHSS: 31     Tenecteplase : [] YES [x] NO      LDL/A1C: 109/5.4     Depression Screen- if depression hx and/or present      Statin Therapy: as noted      Dysphagia Screen: [] PASS [] FAIL- pending      Smoking [] YES [] NO      Afib [] YES [] NO     Stroke Education [] YES [] NO- pending    ASSESSMENT: 89 Yo M with PMH of HTN, colon cancer, and enlarged prostate was transferred from [facility] to [facility] on 8/21. Per chart patient initially complained of nausea,  vomiting and dizziness s/p dinner on 8/20. Was brought to [place] on 8/20 and was found to be unresponsive, apneic breathing, and decorticate posturing in [place] ED. Patient intubated in ED. LKW was 20:00 on 8/20. As per chart review son stated that about 4 months ago patient had a syncopal episode and received TPA and had some hematuria, but eventual workup showed no evidence of stroke. 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 high grade stenosis. Also noted high grade stenosis vs. occlusion of the proximal left vertebral artery. Patient transferred to [facility] for NeuroIR intervention and taken for mechanical thrombectomy on 8/21. TICI 2B.    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     NEURO:   -Neurologically intubated, therefore limited exam, however noted with right hemiparesis and right sided neglect.   -Repeat head CT revealed increased attenuation within the mikaela, new compared to prior imaging. Finding likely compatible with a developing hemorrhagic conversion of  known infarct and/or contrast staining. Would suggest serial head CT for monitoring.   -Continue close monitoring for neurologic deterioration    - Stroke neuro checks q 1 hour as per neuro ICU   -SBP goal as per neuro ICU/neuro IR    -ANTITHROMBOTIC THERAPY: on hold at this time in setting of most recent head CT and s/p thrombectomy.. will be dependent on further neuro imaging   -titrate statin to LDL goal less than 70. . Would initiate high intensity statin when dysphagia screen is passed   Obtain MRI Brain w/o  -Dysphagia screen: pending   -Physical therapy/OT/Speech eval/treatment.   -obtain  TTE, cardiac monitoring w/ telemetry for now, further evaluation pending findings of noted workup  , +/- ILR             - patient should have all age and risk appropriate malignancy screenings with PCP or sooner if clinically suspected    -DVT ppx: Heparin s.c [] LMWH [] SCD[x]  - chemoppx on hold at this time in setting of most recent head CT and s/p thrombectomy. will be dependent on further neuro imaging   -maintain adequate hydration    -Na Goal: 135-145   -monitor for si/sx of infection   -Stroke education  -Further care per primary team     OTHER:  condition and plan of care d/w patient, questions and concerns addressed.     DISPOSITION: Rehab or home depending on PT eval once stable and workup is complete      CORE MEASURES:        Admission NIHSS: 31     Tenecteplase : [] YES [x] NO      LDL/A1C: 109/5.4     Depression Screen- if depression hx and/or present      Statin Therapy: as noted      Dysphagia Screen: [] PASS [] FAIL- pending      Smoking [] YES [] NO      Afib [] YES [] NO     Stroke Education [] YES [] NO- pending    ASSESSMENT: 89 Yo M with PMH of HTN, colon cancer, and enlarged prostate was transferred from [facility] to [facility] on 8/21. Per chart patient initially complained of nausea,  vomiting and dizziness s/p dinner on 8/20. Was brought to [place] on 8/20 and was found to be unresponsive, apneic breathing, and decorticate posturing in [place] ED. Patient intubated in ED. LKW was 20:00 on 8/20. As per chart review son stated that about 4 months ago patient had a syncopal episode and received TPA and had some hematuria, but eventual workup showed no evidence of stroke. 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 high grade stenosis. Also noted high grade stenosis vs. occlusion of the proximal left vertebral artery. Patient transferred to [facility] for NeuroIR intervention and taken for mechanical thrombectomy on 8/21. TICI 2B.    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     NEURO:   -Neurologically intubated, therefore limited exam, however noted with right hemiparesis   -Repeat head CT revealed increased attenuation within the mikaela, new compared to prior imaging. Finding likely compatible with a developing hemorrhagic conversion of  known infarct and/or contrast staining. Would suggest serial head CT for monitoring.   -Continue close monitoring for neurologic deterioration    - Stroke neuro checks q 1 hour as per neuro ICU   -SBP goal as per neuro ICU/neuro IR    -ANTITHROMBOTIC THERAPY: on hold at this time in setting of most recent head CT and s/p thrombectomy.. will be dependent on further neuro imaging   -titrate statin to LDL goal less than 70. . Would initiate high intensity statin when dysphagia screen is passed   Obtain MRI Brain w/o  -Dysphagia screen: pending   -Physical therapy/OT/Speech eval/treatment.   -obtain  TTE, cardiac monitoring w/ telemetry for now, further evaluation pending findings of noted workup  , +/- ILR             - patient should have all age and risk appropriate malignancy screenings with PCP or sooner if clinically suspected    -DVT ppx: Heparin s.c [] LMWH [] SCD[x]  - chemoppx on hold at this time in setting of most recent head CT and s/p thrombectomy. will be dependent on further neuro imaging   -maintain adequate hydration    -Na Goal: 135-145   -monitor for si/sx of infection   -Stroke education  -Further care per primary team     OTHER:  condition and plan of care d/w patient, questions and concerns addressed.     DISPOSITION: Rehab or home depending on PT eval once stable and workup is complete      CORE MEASURES:        Admission NIHSS: 31     Tenecteplase : [] YES [x] NO      LDL/A1C: 109/5.4     Depression Screen- if depression hx and/or present      Statin Therapy: as noted      Dysphagia Screen: [] PASS [] FAIL- pending      Smoking [] YES [] NO      Afib [] YES [] NO     Stroke Education [] YES [] NO- pending

## 2024-08-21 NOTE — H&P ADULT - NSHPLABSRESULTS_GEN_ALL_CORE
CTH: negative     08/21: CT PERFUSION @ [facility]: Perfusion imaging predicts a core infarct of 0 cc within the  bilateral cerebral hemispheres.? Based on the perfusion mismatch, there is a  predicted volume of 61 cc penumbra of tissue at risk.    08/21 CTA COW @ [facility]:  Lack of opacification of the mid to distal basilar artery, this  may be due to poor contrast opacification, however, suspicious for  high-grade stenosis/occlusion.      08/21 CTA NECK @ [facility]: Calcified plaque. Patent, ECAs, ICAs, no  hemodynamically  significant stenosis at  ICA origins by NASCET criteria.  Lack of opacification of the proximal left vertebral artery, this may be due  to poor contrast opacification, however, high-grade stenosis/occlusion is  not excluded.

## 2024-08-21 NOTE — PATIENT PROFILE ADULT - FALL HARM RISK - TYPE OF ASSESSMENT
Cardiology Cardiology Cardiology Cardiology Cardiology Cardiology Cardiology Cardiology Cardiology Cardiology Cardiology Cardiology Cardiology Cardiology Cardiology Cardiology Cardiology Cardiology Cardiology Cardiology Cardiology Cardiology Cardiology Cardiology Cardiology Cardiology Cardiology Cardiology Cardiology Cardiology Cardiology Endocrinology Endocrinology Endocrinology Endocrinology Endocrinology Endocrinology Endocrinology Endocrinology Endocrinology Endocrinology Endocrinology Endocrinology Endocrinology Endocrinology Endocrinology Endocrinology Endocrinology Endocrinology Endocrinology Endocrinology Endocrinology Endocrinology Endocrinology Endocrinology Endocrinology Endocrinology Endocrinology Endocrinology Endocrinology Endocrinology Endocrinology Endocrinology Endocrinology Endocrinology Endocrinology Endocrinology Endocrinology Endocrinology Endocrinology Endocrinology Endocrinology Endocrinology Endocrinology Endocrinology Gastroenterology Gastroenterology Gastroenterology Gastroenterology Gastroenterology Gastroenterology Gastroenterology Gastroenterology Gastroenterology Gastroenterology Gastroenterology Gastroenterology Gastroenterology Gastroenterology Gastroenterology Gastroenterology Gastroenterology Gastroenterology Gastroenterology Gastroenterology Gastroenterology Gastroenterology Gastroenterology Gastroenterology Gastroenterology Hospitalist Hospitalist Hospitalist Hospitalist Hospitalist Hospitalist Hospitalist Hospitalist Hospitalist Hospitalist Hospitalist Hospitalist Hospitalist Hospitalist Hospitalist Hospitalist Hospitalist Hospitalist Hospitalist Hospitalist Hospitalist Hospitalist Hospitalist Hospitalist Hospitalist Hospitalist Hospitalist Hospitalist Infectious Disease Infectious Disease Infectious Disease Infectious Disease Infectious Disease Infectious Disease Infectious Disease Infectious Disease Infectious Disease Infectious Disease Infectious Disease Infectious Disease Infectious Disease Infectious Disease Infectious Disease Infectious Disease Infectious Disease Infectious Disease Infectious Disease Infectious Disease Infectious Disease Infectious Disease Infectious Disease Infectious Disease Infectious Disease Infectious Disease Infectious Disease Infectious Disease Infectious Disease Internal Medicine Internal Medicine Internal Medicine Internal Medicine Internal Medicine Internal Medicine Internal Medicine Internal Medicine Intervent Radiology Intervent Radiology Intervent Radiology Intervent Radiology Intervent Radiology Intervent Radiology Intervent Radiology Intervent Radiology Intervent Radiology MICU MICU MICU MICU MICU MICU MICU MICU MICU MICU Nephrology Nephrology Nephrology Nephrology Nephrology Nephrology Nephrology Nephrology Nephrology Nephrology Nephrology Nephrology Nephrology Nephrology Nephrology Nephrology Nephrology Nephrology Nephrology Nephrology Nephrology Nephrology Nephrology Nephrology Nephrology Nephrology Nephrology Nephrology Nephrology Nephrology Nephrology Nephrology Nephrology Nephrology Nephrology Nephrology Nephrology Nephrology Nephrology Nephrology Nephrology Nephrology Nephrology Nephrology Nephrology Nephrology Neurology Neurology Neurology Neurology Neurology Neurology Neurology Neurology Palliative Care Palliative Care Palliative Care Palliative Care Palliative Care Surgery Surgery Surgery Surgery Surgery Surgery Surgery Surgery Surgery Wound Care Wound Care Wound Care Wound Care Surgery Admission

## 2024-08-22 DIAGNOSIS — I63.9 CEREBRAL INFARCTION, UNSPECIFIED: ICD-10-CM

## 2024-08-22 DIAGNOSIS — I50.20 UNSPECIFIED SYSTOLIC (CONGESTIVE) HEART FAILURE: ICD-10-CM

## 2024-08-22 PROBLEM — C18.9 MALIGNANT NEOPLASM OF COLON, UNSPECIFIED: Chronic | Status: ACTIVE | Noted: 2024-08-21

## 2024-08-22 PROBLEM — I10 ESSENTIAL (PRIMARY) HYPERTENSION: Chronic | Status: ACTIVE | Noted: 2024-08-21

## 2024-08-22 LAB
ANION GAP SERPL CALC-SCNC: 14 MMOL/L — SIGNIFICANT CHANGE UP (ref 5–17)
APPEARANCE UR: CLEAR — SIGNIFICANT CHANGE UP
BACTERIA # UR AUTO: NEGATIVE /HPF — SIGNIFICANT CHANGE UP
BILIRUB UR-MCNC: NEGATIVE — SIGNIFICANT CHANGE UP
BUN SERPL-MCNC: 32.7 MG/DL — HIGH (ref 8–20)
CALCIUM SERPL-MCNC: 8.5 MG/DL — SIGNIFICANT CHANGE UP (ref 8.4–10.5)
CHLORIDE SERPL-SCNC: 106 MMOL/L — SIGNIFICANT CHANGE UP (ref 96–108)
CO2 SERPL-SCNC: 15 MMOL/L — LOW (ref 22–29)
COLOR SPEC: YELLOW — SIGNIFICANT CHANGE UP
CREAT SERPL-MCNC: 2.14 MG/DL — HIGH (ref 0.5–1.3)
DIFF PNL FLD: ABNORMAL
EGFR: 29 ML/MIN/1.73M2 — LOW
GLUCOSE SERPL-MCNC: 142 MG/DL — HIGH (ref 70–99)
GLUCOSE UR QL: NEGATIVE MG/DL — SIGNIFICANT CHANGE UP
HCT VFR BLD CALC: 26 % — LOW (ref 39–50)
HGB BLD-MCNC: 8.2 G/DL — LOW (ref 13–17)
KETONES UR-MCNC: NEGATIVE MG/DL — SIGNIFICANT CHANGE UP
LEUKOCYTE ESTERASE UR-ACNC: NEGATIVE — SIGNIFICANT CHANGE UP
MAGNESIUM SERPL-MCNC: 2.1 MG/DL — SIGNIFICANT CHANGE UP (ref 1.6–2.6)
MCHC RBC-ENTMCNC: 27.4 PG — SIGNIFICANT CHANGE UP (ref 27–34)
MCHC RBC-ENTMCNC: 31.5 GM/DL — LOW (ref 32–36)
MCV RBC AUTO: 87 FL — SIGNIFICANT CHANGE UP (ref 80–100)
MRSA PCR RESULT.: SIGNIFICANT CHANGE UP
NITRITE UR-MCNC: NEGATIVE — SIGNIFICANT CHANGE UP
PH UR: 5.5 — SIGNIFICANT CHANGE UP (ref 5–8)
PHOSPHATE SERPL-MCNC: 4.6 MG/DL — SIGNIFICANT CHANGE UP (ref 2.4–4.7)
PLATELET # BLD AUTO: 121 K/UL — LOW (ref 150–400)
POTASSIUM SERPL-MCNC: 4.3 MMOL/L — SIGNIFICANT CHANGE UP (ref 3.5–5.3)
POTASSIUM SERPL-SCNC: 4.3 MMOL/L — SIGNIFICANT CHANGE UP (ref 3.5–5.3)
PROT UR-MCNC: 30 MG/DL
RBC # BLD: 2.99 M/UL — LOW (ref 4.2–5.8)
RBC # FLD: 15 % — HIGH (ref 10.3–14.5)
RBC CASTS # UR COMP ASSIST: 95 /HPF — HIGH (ref 0–4)
S AUREUS DNA NOSE QL NAA+PROBE: SIGNIFICANT CHANGE UP
SODIUM SERPL-SCNC: 135 MMOL/L — SIGNIFICANT CHANGE UP (ref 135–145)
SP GR SPEC: >1.03 — HIGH (ref 1–1.03)
SQUAMOUS # UR AUTO: 3 /HPF — SIGNIFICANT CHANGE UP (ref 0–5)
UROBILINOGEN FLD QL: 0.2 MG/DL — SIGNIFICANT CHANGE UP (ref 0.2–1)
WBC # BLD: 10.39 K/UL — SIGNIFICANT CHANGE UP (ref 3.8–10.5)
WBC # FLD AUTO: 10.39 K/UL — SIGNIFICANT CHANGE UP (ref 3.8–10.5)
WBC UR QL: 27 /HPF — HIGH (ref 0–5)

## 2024-08-22 PROCEDURE — 99291 CRITICAL CARE FIRST HOUR: CPT

## 2024-08-22 PROCEDURE — 99221 1ST HOSP IP/OBS SF/LOW 40: CPT | Mod: FS

## 2024-08-22 PROCEDURE — 70551 MRI BRAIN STEM W/O DYE: CPT | Mod: 26

## 2024-08-22 RX ORDER — NICARDIPINE HCL 20 MG
5 CAPSULE ORAL
Qty: 40 | Refills: 0 | Status: DISCONTINUED | OUTPATIENT
Start: 2024-08-22 | End: 2024-08-23

## 2024-08-22 RX ORDER — SODIUM BICARBONATE 84 MG/ML
0.14 INJECTION, SOLUTION INTRAVENOUS
Qty: 150 | Refills: 0 | Status: DISCONTINUED | OUTPATIENT
Start: 2024-08-22 | End: 2024-08-23

## 2024-08-22 RX ORDER — ALBUMIN (HUMAN) 5 G/20ML
250 SOLUTION INTRAVENOUS ONCE
Refills: 0 | Status: COMPLETED | OUTPATIENT
Start: 2024-08-22 | End: 2024-08-22

## 2024-08-22 RX ORDER — HYDRALAZINE HCL 50 MG
10 TABLET ORAL
Refills: 0 | Status: DISCONTINUED | OUTPATIENT
Start: 2024-08-22 | End: 2024-08-24

## 2024-08-22 RX ORDER — AMLODIPINE BESYLATE 10 MG/1
5 TABLET ORAL DAILY
Refills: 0 | Status: DISCONTINUED | OUTPATIENT
Start: 2024-08-23 | End: 2024-09-02

## 2024-08-22 RX ADMIN — Medication 25 MG/HR: at 10:59

## 2024-08-22 RX ADMIN — Medication 2 TABLET(S): at 22:17

## 2024-08-22 RX ADMIN — CHLORHEXIDINE GLUCONATE 15 MILLILITER(S): 40 SOLUTION TOPICAL at 06:25

## 2024-08-22 RX ADMIN — AMLODIPINE BESYLATE 5 MILLIGRAM(S): 10 TABLET ORAL at 06:01

## 2024-08-22 RX ADMIN — SODIUM BICARBONATE 65 MEQ/KG/HR: 84 INJECTION, SOLUTION INTRAVENOUS at 11:27

## 2024-08-22 RX ADMIN — CHLORHEXIDINE GLUCONATE 1 APPLICATION(S): 40 SOLUTION TOPICAL at 11:27

## 2024-08-22 RX ADMIN — TAMSULOSIN HYDROCHLORIDE 0.4 MILLIGRAM(S): 0.4 CAPSULE ORAL at 22:17

## 2024-08-22 RX ADMIN — Medication 25 MG/HR: at 06:02

## 2024-08-22 RX ADMIN — Medication 40 MILLIGRAM(S): at 18:27

## 2024-08-22 RX ADMIN — CHLORHEXIDINE GLUCONATE 15 MILLILITER(S): 40 SOLUTION TOPICAL at 19:23

## 2024-08-22 RX ADMIN — POLYETHYLENE GLYCOL 3350 17 GRAM(S): 17 POWDER, FOR SOLUTION ORAL at 11:04

## 2024-08-22 RX ADMIN — Medication 40 MILLIGRAM(S): at 06:01

## 2024-08-22 RX ADMIN — ALBUMIN (HUMAN) 125 MILLILITER(S): 5 SOLUTION INTRAVENOUS at 15:20

## 2024-08-22 NOTE — PROGRESS NOTE ADULT - SUBJECTIVE AND OBJECTIVE BOX
Preliminary note, offical recommendations pending attending review/signature   [facility] Stroke Team  Progress Note     HPI:  89 Yo M with PMH of HTN, colon cancer, and enlarged prostate was transferred from [facility] to [facility] on 8/21. Per chart patient initially complained of nausea,  vomiting and dizziness s/p dinner on 8/20. Was brought to [place] on 8/20 and was found to be unresponsive, apneic breathing, and decorticate posturing in [place] ED. Patient intubated in ED. LKW was 20:00 on 8/20. As per chart review son stated that about 4 months ago patient had a syncopal episode and received TPA and had some hematuria, but eventual workup showed no evidence of stroke. 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 Patient  transferred to [facility] for NeuroIR intervention and taken for mechanical thrombectomy on 8/21. Stroke team called for consult.    SUBJECTIVE: No events overnight.  No new neurologic complaints.  ROS reported negative unless otherwise noted.    chlorhexidine 0.12% Liquid 15 milliLiter(s) Oral Mucosa every 12 hours  chlorhexidine 2% Cloths 1 Application(s) Topical daily  hydrALAZINE Injectable 10 milliGRAM(s) IV Push every 2 hours PRN  niCARdipine Infusion 5 mG/Hr IV Continuous <Continuous>  pantoprazole  Injectable 40 milliGRAM(s) IV Push every 12 hours  polyethylene glycol 3350 17 Gram(s) Oral daily  senna 2 Tablet(s) Oral at bedtime  sodium bicarbonate  Infusion 0.143 mEq/kG/Hr IV Continuous <Continuous>  tamsulosin 0.4 milliGRAM(s) Oral at bedtime      PHYSICAL EXAM:   Vital Signs Last 24 Hrs  T(C): 37 (22 Aug 2024 11:00), Max: 37.6 (21 Aug 2024 19:30)  T(F): 98.6 (22 Aug 2024 11:00), Max: 99.7 (21 Aug 2024 19:30)  HR: 73 (22 Aug 2024 11:01) (62 - 88)  BP: 109/57 (22 Aug 2024 11:00) (96/64 - 127/51)  BP(mean): 73 (22 Aug 2024 11:00) (55 - 89)  RR: 20 (22 Aug 2024 11:00) (10 - 20)  SpO2: 100% (22 Aug 2024 11:01) (99% - 100%)    Parameters below as of 22 Aug 2024 08:00  Patient On (Oxygen Delivery Method): ventilator    O2 Concentration (%): 30    Physical Exam:  General: Patient intubated. Sedated to precedex.   Detailed Neurologic Exam:    Mental status: The patient not awake, ly snot open eyes to voice or noxious stimuli. Not oriented to time, self, or place. No verbal output. does not follow commands    Cranial nerves: Pupils pinpoint but minimally reactive to light. Positive corneal refelx. No facial asymmetry. Does not blink to threat in right peripheral field.     Motor: There is normal bulk and tone.  There is no tremor.  LUE: moves antigravity purposefully but not on command  LLE: movement within bed and withdraws to noxious stimuli   RUE: no movement   RLE: movement within bed and withdraws to noxious stimuli       Sensation: intact to noxious stimuli in RLE, LUE and LLE. no sensation to noxious stimuli in RUE.     Cerebellar: There is no dysmetria on finger to nose testing.    Gait : deferred    LABS:                        8.2    10.39 )-----------( 121      ( 22 Aug 2024 04:10 )             26.0    08-22    135  |  106  |  32.7<H>  ----------------------------<  142<H>  4.3   |  15.0<L>  |  2.14<H>    Ca    8.5      22 Aug 2024 04:10  Phos  4.6     08-22  Mg     2.1     08-22    PT/INR - ( 21 Aug 2024 05:28 )   PT: 11.4 sec;   INR: 1.03 ratio         PTT - ( 21 Aug 2024 05:28 )  PTT:42.8 sec      8-21 Chol 175  HDL 49 Trig 87    A1C: 5.4    RADIOLOGY & ADDITIONAL STUDIES (independently reviewed unless otherwise noted):  CT Head No Cont (08.21.24 @ 15:34)   IMPRESSION:  1.  Redemonstrated hyperintensity within the mikaela, mildly decreased in   attenuation when compared to prior imaging. Finding thought to represent   contrast staining and/or petechial hemorrhage. Continued follow-up   imaging as clinically indicated.  2.  Evolving infarct in the left cerebellar hemisphere.  3.  Chronic ischemic changes discussed above.    CT Head No Cont (08.21.24 @ 08:09)   IMPRESSION:  Increased attenuation within the mikaela, new compared to prior imaging.   Finding likely compatible with a developing hemorrhagic conversion of   patient's known infarct and/or contrast staining. Recommend close   interval imaging for further evaluation.    Head CT 8/20:  IMPRESSION:  No evidence of acute intracranial hemorrhage, midline shift or CT evidence  of acute territorial infarct.    CTA Head and Neck and CTP:   IMPRESSION:  CT PERFUSION: Perfusion imaging predicts a core infarct of 0 cc within the  bilateral cerebral hemispheres.? Based on the perfusion mismatch, there is a  predicted volume of 61 cc penumbra of tissue at risk.    These findings are nonspecific and may be artifactual in nature. MRI  suggested for further evaluation.    CTA COW: Lack of opacification of the mid to distal basilar artery, this  may be due to poor contrast opacification, however, suspicious for  high-grade stenosis/occlusion.    CTA NECK: Calcified plaque. Patent, ECAs, ICAs, no hemodynamically  significant stenosis at ICA origins by NASCET criteria.    Lack of opacification of the proximal left vertebral artery, this may be due  to poor contrast opacification, however, high-grade stenosis/occlusion is  not excluded.    Endotracheal tube with its distal tip above the level of the judson.    Nonspecific thickening of the esophagus.         [facility] Stroke Team  Progress Note     HPI:  89 Yo M with PMH of HTN, colon cancer, and enlarged prostate was transferred from [facility] to [facility] on 8/21. Per chart patient initially complained of nausea,  vomiting and dizziness s/p dinner on 8/20. Was brought to [place] on 8/20 and was found to be unresponsive, apneic breathing, and decorticate posturing in [place] ED. Patient intubated in ED. LKW was 20:00 on 8/20. As per chart review son stated that about 4 months ago patient had a syncopal episode and received TPA and had some hematuria, but eventual workup showed no evidence of stroke. 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 Patient  transferred to [facility] for NeuroIR intervention and taken for mechanical thrombectomy on 8/21. Stroke team called for consult.    SUBJECTIVE: No events overnight.  No new neurologic complaints.  ROS reported negative unless otherwise noted.    chlorhexidine 0.12% Liquid 15 milliLiter(s) Oral Mucosa every 12 hours  chlorhexidine 2% Cloths 1 Application(s) Topical daily  hydrALAZINE Injectable 10 milliGRAM(s) IV Push every 2 hours PRN  niCARdipine Infusion 5 mG/Hr IV Continuous <Continuous>  pantoprazole  Injectable 40 milliGRAM(s) IV Push every 12 hours  polyethylene glycol 3350 17 Gram(s) Oral daily  senna 2 Tablet(s) Oral at bedtime  sodium bicarbonate  Infusion 0.143 mEq/kG/Hr IV Continuous <Continuous>  tamsulosin 0.4 milliGRAM(s) Oral at bedtime      PHYSICAL EXAM:   Vital Signs Last 24 Hrs  T(C): 37 (22 Aug 2024 11:00), Max: 37.6 (21 Aug 2024 19:30)  T(F): 98.6 (22 Aug 2024 11:00), Max: 99.7 (21 Aug 2024 19:30)  HR: 73 (22 Aug 2024 11:01) (62 - 88)  BP: 109/57 (22 Aug 2024 11:00) (96/64 - 127/51)  BP(mean): 73 (22 Aug 2024 11:00) (55 - 89)  RR: 20 (22 Aug 2024 11:00) (10 - 20)  SpO2: 100% (22 Aug 2024 11:01) (99% - 100%)    Parameters below as of 22 Aug 2024 08:00  Patient On (Oxygen Delivery Method): ventilator    O2 Concentration (%): 30    Physical Exam:  General: Patient intubated. Sedated to precedex.   Detailed Neurologic Exam:    Mental status: The patient not awake, ly snot open eyes to voice or noxious stimuli. Not oriented to time, self, or place. No verbal output. does not follow commands    Cranial nerves: Pupils pinpoint but minimally reactive to light. Positive corneal refelx. No facial asymmetry. Does not blink to threat in right peripheral field.     Motor: There is normal bulk and tone.  There is no tremor.  LUE: moves antigravity purposefully but not on command  LLE: movement within bed and withdraws to noxious stimuli   RUE: no movement   RLE: movement within bed and withdraws to noxious stimuli       Sensation: intact to noxious stimuli in RLE, LUE and LLE. no sensation to noxious stimuli in RUE.     Cerebellar: There is no dysmetria on finger to nose testing.    Gait : deferred    LABS:                        8.2    10.39 )-----------( 121      ( 22 Aug 2024 04:10 )             26.0    08-22    135  |  106  |  32.7<H>  ----------------------------<  142<H>  4.3   |  15.0<L>  |  2.14<H>    Ca    8.5      22 Aug 2024 04:10  Phos  4.6     08-22  Mg     2.1     08-22    PT/INR - ( 21 Aug 2024 05:28 )   PT: 11.4 sec;   INR: 1.03 ratio         PTT - ( 21 Aug 2024 05:28 )  PTT:42.8 sec      8-21 Chol 175  HDL 49 Trig 87    A1C: 5.4    RADIOLOGY & ADDITIONAL STUDIES (independently reviewed unless otherwise noted):  CT Head No Cont (08.21.24 @ 15:34)   IMPRESSION:  1.  Redemonstrated hyperintensity within the mikaela, mildly decreased in   attenuation when compared to prior imaging. Finding thought to represent   contrast staining and/or petechial hemorrhage. Continued follow-up   imaging as clinically indicated.  2.  Evolving infarct in the left cerebellar hemisphere.  3.  Chronic ischemic changes discussed above.    CT Head No Cont (08.21.24 @ 08:09)   IMPRESSION:  Increased attenuation within the mikaela, new compared to prior imaging.   Finding likely compatible with a developing hemorrhagic conversion of   patient's known infarct and/or contrast staining. Recommend close   interval imaging for further evaluation.    Head CT 8/20:  IMPRESSION:  No evidence of acute intracranial hemorrhage, midline shift or CT evidence  of acute territorial infarct.    CTA Head and Neck and CTP:   IMPRESSION:  CT PERFUSION: Perfusion imaging predicts a core infarct of 0 cc within the  bilateral cerebral hemispheres.? Based on the perfusion mismatch, there is a  predicted volume of 61 cc penumbra of tissue at risk.    These findings are nonspecific and may be artifactual in nature. MRI  suggested for further evaluation.    CTA COW: Lack of opacification of the mid to distal basilar artery, this  may be due to poor contrast opacification, however, suspicious for  high-grade stenosis/occlusion.    CTA NECK: Calcified plaque. Patent, ECAs, ICAs, no hemodynamically  significant stenosis at ICA origins by NASCET criteria.    Lack of opacification of the proximal left vertebral artery, this may be due  to poor contrast opacification, however, high-grade stenosis/occlusion is  not excluded.    Endotracheal tube with its distal tip above the level of the judson.    Nonspecific thickening of the esophagus.

## 2024-08-22 NOTE — CONSULT NOTE ADULT - PROBLEM SELECTOR RECOMMENDATION 9
- found to have new onset HFrEF (previous EF in 2022 is 50%)   - TTE EF 37%, multiple wall motion abnormalities exist.  mild TR. Mild LVH. Moderate MR.   - will start GDMT as tolerated, cannot start ACEi/ARBs/MRA due to HARINDER on CKD. Cannot start beta blocker at this time due to periods of bradycardia with HR in the 50s  - SBP goal per neuroicu is 100-140   - ischemic workup deferred at this time due to recent medical condition   - will plan for outpatient ischemic work up

## 2024-08-22 NOTE — PROGRESS NOTE ADULT - ASSESSMENT
ASSESSMENT: 89 Yo M with PMH of HTN, colon cancer, and enlarged prostate was transferred from [facility] to [facility] on 8/21. Per chart patient initially complained of nausea,  vomiting and dizziness s/p dinner on 8/20. Was brought to [place] on 8/20 and was found to be unresponsive, apneic breathing, and decorticate posturing in [place] ED. Patient intubated in ED. LKW was 20:00 on 8/20. As per chart review son stated that about 4 months ago patient had a syncopal episode and received TPA and had some hematuria, but eventual workup showed no evidence of stroke. 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 high grade stenosis. Also noted high grade stenosis vs. occlusion of the proximal left vertebral artery. Patient transferred to [facility] for NeuroIR intervention and taken for mechanical thrombectomy on 8/21. TICI 2B.    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     NEURO:   -Neurologically intubated and on Precedex, therefore limited exam.   -Repeat head CT revealed redemonstrated hyperintensity within the mikaela,mildly decreased in attenuation when compared to prior imaging. Finding thought to represent contrast staining and/or petechial hemorrhage. After review with neuro IR attending and stroke attending deemed likely to be contrast staining. Would still suggest serial head CTs.   -Continue close monitoring for neurologic deterioration    - Stroke neuro checks q 1 hour as per neuro ICU   -SBP goal as per neuro ICU/neuro IR. SBP goal 100-140.    -ANTITHROMBOTIC THERAPY: on hold at this time in setting of most recent head CT. will be dependent on further neuro imaging   -titrate statin to LDL goal less than 70. . Would initiate high intensity statin when dysphagia screen is passed   -Obtain MRI Brain w/o  -Dysphagia screen: fail. NGT placed.   -Physical therapy/OT/Speech eval/treatment.   -obtain  TTE, cardiac monitoring w/ telemetry for now, further evaluation pending findings of noted workup  , +/- ILR             - patient should have all age and risk appropriate malignancy screenings with PCP or sooner if clinically suspected    -DVT ppx: Heparin s.c [] LMWH [] SCD[x]  - chemoppx on hold at this time in setting of most recent head CT.  will be dependent on further neuro imaging   -maintain adequate hydration    -Na Goal: 135-145   -monitor for si/sx of infection   -Stroke education  -Further care per primary team   -Would suggest GOC conversation with family.     OTHER:  condition and plan of care d/w patient, questions and concerns addressed.     DISPOSITION: Rehab or home depending on PT eval once stable and workup is complete      CORE MEASURES:        Admission NIHSS: 31     Tenecteplase : [] YES [x] NO      LDL/A1C: 109/5.4     Depression Screen- if depression hx and/or present      Statin Therapy: as noted      Dysphagia Screen: [] PASS [] FAIL- pending      Smoking [] YES [] NO      Afib [] YES [] NO     Stroke Education [] YES [] NO- pending

## 2024-08-22 NOTE — PROGRESS NOTE ADULT - SUBJECTIVE AND OBJECTIVE BOX
HPI:  89 Yo M with PMH of HTN, colon cancer, enlarged prostate transfer from [facility] from [facility] for altered mental status. LKW 7PM, Per chart, Patient initially complained of  Nausea,  vomiting and dizziness s/p dinner, later  found to be unresponsive and decorticate posturing in [place] ED. 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 Patient   transferred to [facility] for NeuroIR intervention.   l (21 Aug 2024 03:06)    O/ne events: episodes of restlessness, started on Precedex ggt, low dose.  Failed CPAP this am.    ICU Vital Signs Last 24 Hrs  T(C): 37 (22 Aug 2024 11:00), Max: 37.6 (21 Aug 2024 19:30)  T(F): 98.6 (22 Aug 2024 11:00), Max: 99.7 (21 Aug 2024 19:30)  HR: 73 (22 Aug 2024 11:01) (62 - 88)  BP: 109/57 (22 Aug 2024 11:00) (96/64 - 127/51)  BP(mean): 73 (22 Aug 2024 11:00) (55 - 89)  ABP: --  ABP(mean): --  RR: 20 (22 Aug 2024 11:00) (10 - 20)  SpO2: 100% (22 Aug 2024 11:01) (99% - 100%)    O2 Parameters below as of 22 Aug 2024 08:00  Patient On (Oxygen Delivery Method): ventilator    O2 Concentration (%): 30    Exam:  less interactive today. Not FC, tracks, PERRLA, o/b the vent, + cough, motor - moves L side spont, RUE weak wdrl, RLE wdrl.  S1S2 present.  CTAB  Abd soft, NT, ND  No peripheral swelling

## 2024-08-22 NOTE — CONSULT NOTE ADULT - ASSESSMENT
89 y/o M with hx of AS s/p TAVR, AFib (dx in 2022, refused AC, EF at that time was reported at 50%), LBBB, 1st AV block, HTN, HLD, CKD, hx of stage 3 colon cancer in 2009 (s/p surgery), and lower GIB presenting from [facility] from [facility] for AMS. 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

## 2024-08-22 NOTE — CONSULT NOTE ADULT - SUBJECTIVE AND OBJECTIVE BOX
[facility]                                              CARDIOLOGY AT [address], [place] [address]                                             Telephone: [phone]. Fax:[phone]                                                       CARDIOLOGY CONSULTATION NOTE                                                                                             History obtained by: Patient and medical record  Community Cardiologist: Dr. Tanner Marroquin    obtained: Yes [  ] No [ x ]  Reason for Consultation: new onset HFrEF   Available out pt records reviewed: Yes [  ] No [ x ]    Chief complaint:    Patient is a 89y old  Male who presents with a chief complaint of Stroke (22 Aug 2024 13:24)      HPI: Due to current medical condition, subjective information was not able to be obtained from the patient. History was obtained, to the extent possible, from review of the chart and collateral sources of information.     89 Yo M with PMH of HTN, colon cancer, enlarged prostate transfer from [facility] from [facility] for altered mental status. LKW was at 20:00 on 8/20. Per chart, Patient initially complained of Nausea, vomiting and dizziness s/p dinner, later  found to be unresponsive and decorticate posturing in [place] ED. Son stated that about 4 months ago patient had a syncopal episode and received TPA and had some hematuria, but eventual workup showed no evidence of stroke. 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 Patient transferred to [facility] for NeuroIR intervention.      CARDIAC TESTING   ECHO: < from: TTE W or WO Ultrasound Enhancing Agent (08.21.24 @ 12:49) >      1. Left ventricular cavity is normal in size. Left ventricular systolic function is moderately decreased with an ejection fraction of 37 % by Kern's method of disks.   2. Multiple segmental abnormalitiesexist. See findings.   3. The left ventricular diastolic function is indeterminate.   4. Normal right ventricular cavity size and normal right ventricular systolic function.   5. Left atrium is mildly dilated.   6. The right atrium is normal in size.   7. Moderate mitral regurgitation.   8. Structurally normal mitral valve with normal leaflet excursion.   9. Mild tricuspid regurgitation.  10. Estimated pulmonary artery systolic pressure is 34 mmHg.  11. Lipomatous interatrial septal hypertrophy present.  12. Mild left ventricular hypertrophy.  13. There is mild calcification of the mitral valve annulus.  14. There is no evidence of a left ventricular thrombus.    < end of copied text >      STRESS: none     CATH: none     ELECTROPHYSIOLOGY: none     PAST MEDICAL HISTORY  Colonic cancer    HTN (hypertension)        PAST SURGICAL HISTORY      SOCIAL HISTORY:  unable to obtain due to intubation and sedation   CIGARETTES:     ALCOHOL:  DRUGS:    FAMILY HISTORY: unable to obtain due to intubation and sedation      Family History of Cardiovascular Disease:  Yes [  ] No [  ]  Coronary Artery Disease in first degree relative: Yes [  ] No [  ]  Sudden Cardiac Death in First degree relative: Yes [  ] No [  ]    HOME MEDICATIONS:  amLODIPine 5 mg oral tablet: 1 tab(s) orally once a day (21 Aug 2024 03:19)  aspirin 81 mg oral tablet, chewable: 1 tab(s) chewed once a day (21 Aug 2024 12:59)  ferrous sulfate: 2 times a day (21 Aug 2024 12:59)  multivitamin: once a day (21 Aug 2024 12:59)  pantoprazole 40 mg oral delayed release tablet: 1 tab(s) orally 2 times a day (21 Aug 2024 12:56)  sucralfate 1 g oral tablet: 1 tab(s) orally 4 times a day (with meals and at bedtime) (21 Aug 2024 12:59)  tamsulosin 0.4 mg oral capsule: 1 cap(s) orally once a day (21 Aug 2024 03:18)      CURRENT CARDIAC MEDICATIONS:  hydrALAZINE Injectable 10 milliGRAM(s) IV Push every 2 hours PRN SBP >120  niCARdipine Infusion 5 mG/Hr IV Continuous <Continuous>      CURRENT OTHER MEDICATIONS:  pantoprazole  Injectable 40 milliGRAM(s) IV Push every 12 hours  polyethylene glycol 3350 17 Gram(s) Oral daily  senna 2 Tablet(s) Oral at bedtime  chlorhexidine 0.12% Liquid 15 milliLiter(s) Oral Mucosa every 12 hours  chlorhexidine 2% Cloths 1 Application(s) Topical daily  sodium bicarbonate  Infusion 0.143 mEq/kG/Hr (65 mL/Hr) IV Continuous <Continuous>  tamsulosin 0.4 milliGRAM(s) Oral at bedtime      ALLERGIES:   Allergy Status Unknown      REVIEW OF SYMPTOMS:  unable to obtain due to intubation and sedation      VITAL SIGNS:  T(C): 37 (08-22-24 @ 11:00), Max: 37.6 (08-21-24 @ 19:30)  T(F): 98.6 (08-22-24 @ 11:00), Max: 99.7 (08-21-24 @ 19:30)  HR: 73 (08-22-24 @ 11:01) (62 - 88)  BP: 109/57 (08-22-24 @ 11:00) (96/64 - 127/51)  RR: 20 (08-22-24 @ 11:00) (10 - 20)  SpO2: 100% (08-22-24 @ 11:01) (99% - 100%)    INTAKE AND OUTPUT:     08-21 @ 07:01  -  08-22 @ 07:00  --------------------------------------------------------  IN: 2513.3 mL / OUT: 500 mL / NET: 2013.3 mL    08-22 @ 07:01  -  08-22 @ 13:43  --------------------------------------------------------  IN: 95.9 mL / OUT: 0 mL / NET: 95.9 mL        PHYSICAL EXAM:  Constitutional: Comfortable . No acute distress.   HEENT: Atraumatic and normocephalic , neck is supple . no JVD. No carotid bruit.  CNS: sedated No focal deficits.   Respiratory: CTAB, unlabored. intubated   Cardiovascular: RRR normal s1 s2. No murmur. No rubs or gallop.  Gastrointestinal: Soft, non-tender. +Bowel sounds.   Extremities: 2+ Peripheral Pulses, No clubbing, cyanosis, or edema  Psychiatric: Calm . no agitation.   Skin: Warm and dry, no ulcers on extremities     LABS:                            8.2    10.39 )-----------( 121      ( 22 Aug 2024 04:10 )             26.0     08-22    135  |  106  |  32.7<H>  ----------------------------<  142<H>  4.3   |  15.0<L>  |  2.14<H>    Ca    8.5      22 Aug 2024 04:10  Phos  4.6     08-22  Mg     2.1     08-22      PT/INR - ( 21 Aug 2024 05:28 )   PT: 11.4 sec;   INR: 1.03 ratio         PTT - ( 21 Aug 2024 05:28 )  PTT:42.8 sec  Urinalysis Basic - ( 22 Aug 2024 04:10 )    Color: x / Appearance: x / SG: x / pH: x  Gluc: 142 mg/dL / Ketone: x  / Bili: x / Urobili: x   Blood: x / Protein: x / Nitrite: x   Leuk Esterase: x / RBC: x / WBC x   Sq Epi: x / Non Sq Epi: x / Bacteria: x              INTERPRETATION OF TELEMETRY:  NSR     ECG:  NSR   Prior ECG: Yes [ x ] No [  ]    RADIOLOGY & ADDITIONAL STUDIES:    X-ray:  < from: Xray Chest 1 View-PORTABLE IMMEDIATE (Xray Chest 1 View-PORTABLE IMMEDIATE .) (08.21.24 @ 11:34) >  .    IMPRESSION: Improving infiltrate off left heart border.    < end of copied text >    CT scan: < from: CT Head No Cont (08.21.24 @ 15:34) >  1.  Redemonstrated hyperintensity within the mikaela, mildly decreased in   attenuation when compared to prior imaging. Finding thought to represent   contrast staining and/or petechial hemorrhage. Continued follow-up   imaging as clinically indicated.  2.  Evolving infarct in the left cerebellar hemisphere.  3.  Chronic ischemic changes discussed above.    < end of copied text >    MRI:   US:

## 2024-08-22 NOTE — PROGRESS NOTE ADULT - ASSESSMENT
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   Acute resp failure due to neurologic injury, failing SBTs.  AS s/p TAVR, Afib (diagnosed in 2022, refused AC; EF at that time reported as 50%), LBBB, 1st degr AV block; HTN, HLD, CKD.  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  H/o Stage 3 colon cancer in 2009, had surgery; hemorrhoids, h/o LGIB.  Reportedly, with declining mental and physical condition over the last 2 years.  Chano on CKD vs worsening CKD.    Plan:  - neurochecks  - MRI brain planned for today  - stroke core measures  - hold any anti-thrombotics for now, re-eval after neuro-imaging  - SBP goal 100-140 now; cannot start ACE/ARB/BB in view of renal worsening and episodes of edgar with underlying conduction abnormality; Cardiology involved  - maintain Osats>92%, vent support, SBT as tolerated  - monitor e-lytes, I/Os, serial bladder scans, low threshold for [place]  - start bicarb ggt till am, low rate, monitor for signs of overload  - maintain -180, ISS  - DVT prophylaxis: SCDs only for now  - ongoing GOC discussion with family, full code

## 2024-08-22 NOTE — CONSULT NOTE ADULT - PROBLEM SELECTOR RECOMMENDATION 2
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 , start statin when able to   - ASA on hold due to recent thrombectomy, resume per primary team

## 2024-08-23 LAB
ALBUMIN SERPL ELPH-MCNC: 3.2 G/DL — LOW (ref 3.3–5.2)
ALP SERPL-CCNC: 101 U/L — SIGNIFICANT CHANGE UP (ref 40–120)
ALT FLD-CCNC: 24 U/L — SIGNIFICANT CHANGE UP
ANION GAP SERPL CALC-SCNC: 12 MMOL/L — SIGNIFICANT CHANGE UP (ref 5–17)
APPEARANCE UR: ABNORMAL
AST SERPL-CCNC: 22 U/L — SIGNIFICANT CHANGE UP
BACTERIA # UR AUTO: NEGATIVE /HPF — SIGNIFICANT CHANGE UP
BILIRUB DIRECT SERPL-MCNC: 0.1 MG/DL — SIGNIFICANT CHANGE UP (ref 0–0.3)
BILIRUB INDIRECT FLD-MCNC: SIGNIFICANT CHANGE UP MG/DL (ref 0.2–1)
BILIRUB SERPL-MCNC: <0.2 MG/DL — LOW (ref 0.4–2)
BILIRUB UR-MCNC: NEGATIVE — SIGNIFICANT CHANGE UP
BUN SERPL-MCNC: 41.1 MG/DL — HIGH (ref 8–20)
CALCIUM SERPL-MCNC: 8.2 MG/DL — LOW (ref 8.4–10.5)
CHLORIDE SERPL-SCNC: 107 MMOL/L — SIGNIFICANT CHANGE UP (ref 96–108)
CO2 SERPL-SCNC: 22 MMOL/L — SIGNIFICANT CHANGE UP (ref 22–29)
COLOR SPEC: ABNORMAL
CREAT SERPL-MCNC: 2.27 MG/DL — HIGH (ref 0.5–1.3)
DIFF PNL FLD: ABNORMAL
EGFR: 27 ML/MIN/1.73M2 — LOW
GLUCOSE SERPL-MCNC: 147 MG/DL — HIGH (ref 70–99)
GLUCOSE UR QL: NEGATIVE MG/DL — SIGNIFICANT CHANGE UP
HCT VFR BLD CALC: 25.2 % — LOW (ref 39–50)
HGB BLD-MCNC: 8.3 G/DL — LOW (ref 13–17)
KETONES UR-MCNC: NEGATIVE MG/DL — SIGNIFICANT CHANGE UP
LEUKOCYTE ESTERASE UR-ACNC: ABNORMAL
MAGNESIUM SERPL-MCNC: 2.1 MG/DL — SIGNIFICANT CHANGE UP (ref 1.6–2.6)
MCHC RBC-ENTMCNC: 27.9 PG — SIGNIFICANT CHANGE UP (ref 27–34)
MCHC RBC-ENTMCNC: 32.9 GM/DL — SIGNIFICANT CHANGE UP (ref 32–36)
MCV RBC AUTO: 84.8 FL — SIGNIFICANT CHANGE UP (ref 80–100)
NITRITE UR-MCNC: NEGATIVE — SIGNIFICANT CHANGE UP
PH UR: 5.5 — SIGNIFICANT CHANGE UP (ref 5–8)
PHOSPHATE SERPL-MCNC: 4.1 MG/DL — SIGNIFICANT CHANGE UP (ref 2.4–4.7)
PLATELET # BLD AUTO: 160 K/UL — SIGNIFICANT CHANGE UP (ref 150–400)
POTASSIUM SERPL-MCNC: 4.4 MMOL/L — SIGNIFICANT CHANGE UP (ref 3.5–5.3)
POTASSIUM SERPL-SCNC: 4.4 MMOL/L — SIGNIFICANT CHANGE UP (ref 3.5–5.3)
PROT SERPL-MCNC: 6.2 G/DL — LOW (ref 6.6–8.7)
PROT UR-MCNC: 100 MG/DL
RBC # BLD: 2.97 M/UL — LOW (ref 4.2–5.8)
RBC # FLD: 15.1 % — HIGH (ref 10.3–14.5)
RBC CASTS # UR COMP ASSIST: >1900 /HPF — HIGH (ref 0–4)
SODIUM SERPL-SCNC: 141 MMOL/L — SIGNIFICANT CHANGE UP (ref 135–145)
SP GR SPEC: 1.02 — SIGNIFICANT CHANGE UP (ref 1–1.03)
SQUAMOUS # UR AUTO: 4 /HPF — SIGNIFICANT CHANGE UP (ref 0–5)
UROBILINOGEN FLD QL: 0.2 MG/DL — SIGNIFICANT CHANGE UP (ref 0.2–1)
WBC # BLD: 13.08 K/UL — HIGH (ref 3.8–10.5)
WBC # FLD AUTO: 13.08 K/UL — HIGH (ref 3.8–10.5)
WBC UR QL: 35 /HPF — HIGH (ref 0–5)

## 2024-08-23 PROCEDURE — 93010 ELECTROCARDIOGRAM REPORT: CPT

## 2024-08-23 PROCEDURE — 71045 X-RAY EXAM CHEST 1 VIEW: CPT | Mod: 26

## 2024-08-23 PROCEDURE — 99291 CRITICAL CARE FIRST HOUR: CPT

## 2024-08-23 PROCEDURE — 99291 CRITICAL CARE FIRST HOUR: CPT | Mod: FS

## 2024-08-23 RX ORDER — FENTANYL CITRATE 50 UG/ML
25 INJECTION INTRAMUSCULAR; INTRAVENOUS
Refills: 0 | Status: DISCONTINUED | OUTPATIENT
Start: 2024-08-23 | End: 2024-08-25

## 2024-08-23 RX ORDER — NYSTATIN 100000/G
1 CREAM (GRAM) TOPICAL
Refills: 0 | Status: DISCONTINUED | OUTPATIENT
Start: 2024-08-23 | End: 2024-09-06

## 2024-08-23 RX ORDER — ACETYLCYSTEINE 200 MG/ML
4 VIAL (ML) MISCELLANEOUS EVERY 6 HOURS
Refills: 0 | Status: COMPLETED | OUTPATIENT
Start: 2024-08-23 | End: 2024-08-24

## 2024-08-23 RX ORDER — ASPIRIN 81 MG
81 TABLET, DELAYED RELEASE (ENTERIC COATED) ORAL DAILY
Refills: 0 | Status: DISCONTINUED | OUTPATIENT
Start: 2024-08-23 | End: 2024-08-26

## 2024-08-23 RX ORDER — DEXMEDETOMIDINE HYDROCHLORIDE IN 0.9% SODIUM CHLORIDE 4 UG/ML
0.2 INJECTION INTRAVENOUS
Qty: 400 | Refills: 0 | Status: DISCONTINUED | OUTPATIENT
Start: 2024-08-23 | End: 2024-08-25

## 2024-08-23 RX ORDER — HEPARIN SODIUM,BOVINE 1000/ML
5000 VIAL (ML) INJECTION EVERY 8 HOURS
Refills: 0 | Status: DISCONTINUED | OUTPATIENT
Start: 2024-08-23 | End: 2024-08-26

## 2024-08-23 RX ORDER — FERROUS SULFATE 325(65) MG
300 TABLET ORAL DAILY
Refills: 0 | Status: DISCONTINUED | OUTPATIENT
Start: 2024-08-23 | End: 2024-09-02

## 2024-08-23 RX ADMIN — FENTANYL CITRATE 25 MICROGRAM(S): 50 INJECTION INTRAMUSCULAR; INTRAVENOUS at 17:48

## 2024-08-23 RX ADMIN — SODIUM BICARBONATE 65 MEQ/KG/HR: 84 INJECTION, SOLUTION INTRAVENOUS at 04:35

## 2024-08-23 RX ADMIN — AMLODIPINE BESYLATE 5 MILLIGRAM(S): 10 TABLET ORAL at 05:14

## 2024-08-23 RX ADMIN — TAMSULOSIN HYDROCHLORIDE 0.4 MILLIGRAM(S): 0.4 CAPSULE ORAL at 22:06

## 2024-08-23 RX ADMIN — DEXMEDETOMIDINE HYDROCHLORIDE IN 0.9% SODIUM CHLORIDE 3.4 MICROGRAM(S)/KG/HR: 4 INJECTION INTRAVENOUS at 19:37

## 2024-08-23 RX ADMIN — Medication 4 MILLILITER(S): at 20:29

## 2024-08-23 RX ADMIN — CHLORHEXIDINE GLUCONATE 15 MILLILITER(S): 40 SOLUTION TOPICAL at 18:40

## 2024-08-23 RX ADMIN — POLYETHYLENE GLYCOL 3350 17 GRAM(S): 17 POWDER, FOR SOLUTION ORAL at 11:01

## 2024-08-23 RX ADMIN — FENTANYL CITRATE 25 MICROGRAM(S): 50 INJECTION INTRAMUSCULAR; INTRAVENOUS at 03:12

## 2024-08-23 RX ADMIN — FENTANYL CITRATE 25 MICROGRAM(S): 50 INJECTION INTRAMUSCULAR; INTRAVENOUS at 01:20

## 2024-08-23 RX ADMIN — Medication 1 APPLICATION(S): at 09:02

## 2024-08-23 RX ADMIN — Medication 1000 MILLILITER(S): at 18:20

## 2024-08-23 RX ADMIN — Medication 40 MILLIGRAM(S): at 05:14

## 2024-08-23 RX ADMIN — Medication 10 MILLIGRAM(S): at 03:04

## 2024-08-23 RX ADMIN — CHLORHEXIDINE GLUCONATE 1 APPLICATION(S): 40 SOLUTION TOPICAL at 11:02

## 2024-08-23 RX ADMIN — FENTANYL CITRATE 25 MICROGRAM(S): 50 INJECTION INTRAMUSCULAR; INTRAVENOUS at 01:12

## 2024-08-23 RX ADMIN — Medication 40 MILLIGRAM(S): at 17:49

## 2024-08-23 RX ADMIN — Medication 300 MILLIGRAM(S): at 11:02

## 2024-08-23 RX ADMIN — Medication 1 APPLICATION(S): at 19:41

## 2024-08-23 RX ADMIN — Medication 2.5 MILLIGRAM(S): at 20:29

## 2024-08-23 RX ADMIN — Medication 5000 UNIT(S): at 17:49

## 2024-08-23 RX ADMIN — Medication 2 TABLET(S): at 22:06

## 2024-08-23 RX ADMIN — CHLORHEXIDINE GLUCONATE 15 MILLILITER(S): 40 SOLUTION TOPICAL at 05:14

## 2024-08-23 RX ADMIN — Medication 81 MILLIGRAM(S): at 11:01

## 2024-08-23 RX ADMIN — FENTANYL CITRATE 25 MICROGRAM(S): 50 INJECTION INTRAMUSCULAR; INTRAVENOUS at 03:30

## 2024-08-23 RX ADMIN — Medication 20 MILLIGRAM(S): at 11:04

## 2024-08-23 RX ADMIN — Medication 10 MILLIGRAM(S): at 00:13

## 2024-08-23 NOTE — DIETITIAN INITIAL EVALUATION ADULT - PERTINENT MEDS FT
MEDICATIONS  (STANDING):  pantoprazole  Injectable 40 milliGRAM(s) IV Push every 12 hours  polyethylene glycol 3350 17 Gram(s) Oral daily  senna 2 Tablet(s) Oral at bedtime    MEDICATIONS  (PRN):  fentaNYL    Injectable 25 MICROGram(s) IV Push every 2 hours PRN vent synchrony, resp distress  hydrALAZINE Injectable 10 milliGRAM(s) IV Push every 2 hours PRN SBP >120

## 2024-08-23 NOTE — DIETITIAN INITIAL EVALUATION ADULT - NS FNS DIET ORDER
Diet, NPO with Tube Feed:   Tube Feeding Modality: Nasogastric  Jevity 1.5 Jourdan (JEVITY1.5)  Total Volume for 24 Hours (mL): 1080  Continuous  Starting Tube Feed Rate {mL per Hour}: 10  Increase Tube Feed Rate by (mL): 10     Every 4 hours  Until Goal Tube Feed Rate (mL per Hour): 45  Tube Feed Duration (in Hours): 24  Tube Feed Start Time: 19:00 (08-21-24 @ 18:00)

## 2024-08-23 NOTE — DIETITIAN INITIAL EVALUATION ADULT - OTHER INFO
89 Yo M with PMH of HTN, colon cancer, and enlarged prostate was transferred from [facility] to [facility] on 8/21. Per chart patient initially complained of nausea, vomiting and dizziness s/p dinner on 8/20. Was brought to [place] on 8/20 and found to be unresponsive, apneic breathing, and decorticate posturing in [place] ED, pt intubated. Per chart review, son stated that about 4 months ago patient had a syncopal episode and received TPA and had some hematuria, but eventual workup showed no evidence of stroke. 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 Patient  transferred to [facility] for NeuroIR intervention and taken for mechanical thrombectomy on 8/21. MRI brain revealed acute bilateral cerebellar and pontine infarcts. 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

## 2024-08-23 NOTE — DIETITIAN INITIAL EVALUATION ADULT - PERTINENT LABORATORY DATA
08-23    141  |  107  |  41.1<H>  ----------------------------<  147<H>  4.4   |  22.0  |  2.27<H>    Ca    8.2<L>      23 Aug 2024 04:20  Phos  4.1     08-23  Mg     2.1     08-23    TPro  6.2<L>  /  Alb  3.2<L>  /  TBili  <0.2<L>  /  DBili  0.1  /  AST  22  /  ALT  24  /  AlkPhos  101  08-23  A1C with Estimated Average Glucose Result: 5.4 % (08-21-24 @ 05:28)

## 2024-08-23 NOTE — PROGRESS NOTE ADULT - ASSESSMENT
INCOMPLETE  ASSESSMENT: 89 Yo M with PMH of HTN, colon cancer, and enlarged prostate was transferred from [facility] to [facility] on 8/21. Per chart patient initially complained of nausea,  vomiting and dizziness s/p dinner on 8/20. Was brought to [place] on 8/20 and was found to be unresponsive, apneic breathing, and decorticate posturing in [place] ED. Patient intubated in ED. LKW was 20:00 on 8/20. As per chart review son stated that about 4 months ago patient had a syncopal episode and received TPA and had some hematuria, but eventual workup showed no evidence of stroke. 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 high grade stenosis. Also noted high grade stenosis vs. occlusion of the proximal left vertebral artery. Patient transferred to [facility] for NeuroIR intervention and taken for mechanical thrombectomy on 8/21. TICI 2B.    MRI brain revealed acute bilateral cerebellar and pontine infarcts. 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     NEURO:   -Neurologically intubated and on Precedex, therefore limited exam.   -MRI brain as noted. More diffuse high signal in the mikaela more likely   contrast staining  -Continue close monitoring for neurologic deterioration    - Stroke neuro checks q 1 hour as per neuro ICU   -SBP goal as per neuro ICU/neuro IR. SBP goal 100-140.    -ANTITHROMBOTIC THERAPY: due to MRI brain results patient started on Asa 81mg daily   -titrate statin to LDL goal less than 70. . Would initiate high intensity statin when dysphagia screen is passed   -Dysphagia screen: fail. NGT placed.   -Physical therapy/OT/Speech eval/treatment.   -obtain  TTE, cardiac monitoring w/ telemetry for now, further evaluation pending findings of noted workup  , +/- ILR             - patient should have all age and risk appropriate malignancy screenings with PCP or sooner if clinically suspected    -DVT ppx: Heparin s.c [] LMWH [] SCD[x]  - chemoppx on hold at this time in setting of most recent head CT.  will be dependent on further neuro imaging   -maintain adequate hydration    -Na Goal: 135-145   -monitor for si/sx of infection   -Stroke education  -Further care per primary team   -Would suggest GOC conversation with family.     OTHER:  condition and plan of care d/w patient, questions and concerns addressed.     DISPOSITION: Rehab or home depending on PT eval once stable and workup is complete      CORE MEASURES:        Admission NIHSS: 31     Tenecteplase : [] YES [x] NO      LDL/A1C: 109/5.4     Depression Screen- if depression hx and/or present      Statin Therapy: as noted      Dysphagia Screen: [] PASS [] FAIL- pending      Smoking [] YES [] NO      Afib [] YES [] NO     Stroke Education [] YES [] NO- pending    INCOMPLETE  ASSESSMENT: 89 Yo M with PMH of HTN, colon cancer, and enlarged prostate was transferred from [facility] to [facility] on 8/21. Per chart patient initially complained of nausea,  vomiting and dizziness s/p dinner on 8/20. Was brought to [place] on 8/20 and was found to be unresponsive, apneic breathing, and decorticate posturing in [place] ED. Patient intubated in ED. LKW was 20:00 on 8/20. As per chart review son stated that about 4 months ago patient had a syncopal episode and received TPA and had some hematuria, but eventual workup showed no evidence of stroke. 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 high grade stenosis. Also noted high grade stenosis vs. occlusion of the proximal left vertebral artery. Patient transferred to [facility] for NeuroIR intervention and taken for mechanical thrombectomy on 8/21. TICI 2B.    MRI brain revealed acute bilateral cerebellar and pontine infarcts. 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     NEURO:   -Neurologically intubated and on Precedex, therefore limited exam.   -MRI brain as noted. More diffuse high signal in the mikaela more likely   contrast staining  -Continue close monitoring for neurologic deterioration    - Stroke neuro checks q 1 hour as per neuro ICU   -SBP goal as per neuro ICU/neuro IR. SBP goal 100-140.    -ANTITHROMBOTIC THERAPY: due to MRI brain results patient started on Asa 81mg daily   -titrate statin to LDL goal less than 70. . Would initiate high intensity statin when dysphagia screen is passed   -Dysphagia screen: fail. NGT placed.   -Physical therapy/OT/Speech eval/treatment.   -TTE as noted with EF of 37%.  cardiac monitoring w/ telemetry for now, further evaluation pending findings of noted workup  , +/- ILR             - patient should have all age and risk appropriate malignancy screenings with PCP or sooner if clinically suspected    -DVT ppx: Heparin s.c [x] LMWH [] SCD[] - chemoppx initiated in setting of mri brain findings   -maintain adequate hydration    -Na Goal: 135-145   -monitor for si/sx of infection   -Stroke education  -Further care per primary team   -Would suggest GOC conversation with family.     OTHER:  condition and plan of care d/w patient, questions and concerns addressed.     DISPOSITION: Rehab or home depending on PT eval once stable and workup is complete      CORE MEASURES:        Admission NIHSS: 31     Tenecteplase : [] YES [x] NO      LDL/A1C: 109/5.4     Depression Screen- if depression hx and/or present      Statin Therapy: as noted      Dysphagia Screen: [] PASS [] FAIL- pending      Smoking [] YES [] NO      Afib [] YES [] NO     Stroke Education [] YES [] NO- pending    INCOMPLETE  ASSESSMENT: 89 Yo M with PMH of HTN, colon cancer, afib(diagnosed in 2022 and refused anticoagulation), and enlarged prostate was transferred from [facility] to [facility] on 8/21. Per chart patient initially complained of nausea,  vomiting and dizziness s/p dinner on 8/20. Was brought to [place] on 8/20 and was found to be unresponsive, apneic breathing, and decorticate posturing in [place] ED. Patient intubated in ED. LKW was 20:00 on 8/20. As per chart review son stated that about 4 months ago patient had a syncopal episode and received TPA and had some hematuria, but eventual workup showed no evidence of stroke. 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 high grade stenosis. Also noted high grade stenosis vs. occlusion of the proximal left vertebral artery. Patient transferred to [facility] for NeuroIR intervention and taken for mechanical thrombectomy on 8/21. TICI 2B.    MRI brain revealed acute bilateral cerebellar and pontine infarcts. 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     NEURO:   -Neurologically intubated and on Precedex, therefore limited exam.   -MRI brain as noted. More diffuse high signal in the mikaela more likely   contrast staining  -Continue close monitoring for neurologic deterioration    - Stroke neuro checks q 1 hour as per neuro ICU   -SBP goal as per neuro ICU/neuro IR. SBP goal 100-140.    -ANTITHROMBOTIC THERAPY: due to MRI brain results patient started on Asa 81mg daily   -titrate statin to LDL goal less than 70. . Would initiate high intensity statin when dysphagia screen is passed   -Dysphagia screen: fail. NGT placed.   -Physical therapy/OT/Speech eval/treatment.   -TTE as noted with EF of 37%.  cardiac monitoring w/ telemetry for now, further evaluation pending findings of noted workup  , +/- ILR             - patient should have all age and risk appropriate malignancy screenings with PCP or sooner if clinically suspected    -DVT ppx: Heparin s.c [x] LMWH [] SCD[] - chemoppx initiated in setting of mri brain findings   -maintain adequate hydration    -Na Goal: 135-145   -monitor for si/sx of infection   -Stroke education  -Further care per primary team   -Would suggest GOC conversation with family.     OTHER:  condition and plan of care d/w patient, questions and concerns addressed.     DISPOSITION: Rehab or home depending on PT eval once stable and workup is complete      CORE MEASURES:        Admission NIHSS: 31     Tenecteplase : [] YES [x] NO      LDL/A1C: 109/5.4     Depression Screen- if depression hx and/or present      Statin Therapy: as noted      Dysphagia Screen: [] PASS [] FAIL- pending      Smoking [] YES [] NO      Afib [] YES [] NO     Stroke Education [] YES [] NO- pending    ASSESSMENT: 89 Yo M with PMH of HTN, colon cancer, afib(diagnosed in 2022 and refused anticoagulation), and enlarged prostate was transferred from [facility] to [facility] on 8/21. Per chart patient initially complained of nausea,  vomiting and dizziness s/p dinner on 8/20. Was brought to [place] on 8/20 and was found to be unresponsive, apneic breathing, and decorticate posturing in [place] ED. Patient intubated in ED. LKW was 20:00 on 8/20. As per chart review son stated that about 4 months ago patient had a syncopal episode and received TPA and had some hematuria, but eventual workup showed no evidence of stroke. 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 high grade stenosis. Also noted high grade stenosis vs. occlusion of the proximal left vertebral artery. Patient transferred to [facility] for NeuroIR intervention and taken for mechanical thrombectomy on 8/21. TICI 2B.    MRI brain revealed acute bilateral cerebellar and pontine infarcts. Etiology at this time with competing mechanisms. Appears large artery embolism, however patient with hx of atrial fibrillation and not on anticoagulation which could be contributory as well.     NEURO:   -Neurologically intubated with limited exam. without acute change.   -MRI brain as noted. More diffuse high signal in the mikaela more likely contrast staining  -Continue close monitoring for neurologic deterioration    - Stroke neuro checks q 1 hour as per neuro ICU  -SBP goal 100-160   -ANTITHROMBOTIC THERAPY: due to MRI brain results patient started on ASA 81mg daily. patient with hx of atrial fibrillation. did refuse anticogualtion in the past, however dependent on clinical course and goals of care would obtain head CT on 8/26 to determine timeline for anticoagulation if applicable.   -titrate statin to LDL goal less than 70. . Would initiate high intensity statin when dysphagia screen is passed   -Dysphagia screen: fail. NGT placed.   -Physical therapy/OT/Speech eval/treatment.   -TTE as noted with EF of 37%.  cardiac monitoring w/ telemetry for now, further evaluation pending findings of noted workup  , +/- ILR             - patient should have all age and risk appropriate malignancy screenings with PCP or sooner if clinically suspected    -DVT ppx: Heparin s.c [x] LMWH [] SCD[] - chemoppx initiated in setting of mri brain findings   -maintain adequate hydration    -Na Goal: 135-145   -monitor for si/sx of infection   -Stroke education  -Further care per primary team   -Would suggest GOC conversation with family.     OTHER:  condition and plan of care d/w patient, questions and concerns addressed.     DISPOSITION: Rehab or home depending on PT eval once stable and workup is complete      CORE MEASURES:        Admission NIHSS: 31     Tenecteplase : [] YES [x] NO      LDL/A1C: 109/5.4     Depression Screen- if depression hx and/or present      Statin Therapy: as noted      Dysphagia Screen: [] PASS [] FAIL- pending      Smoking [] YES [] NO      Afib [] YES [] NO     Stroke Education [] YES [] NO- pending    ASSESSMENT: 89 Yo M with PMH of HTN, colon cancer, afib(diagnosed in 2022 and refused anticoagulation), and enlarged prostate was transferred from [facility] to [facility] on 8/21. Per chart patient initially complained of nausea,  vomiting and dizziness s/p dinner on 8/20. Was brought to [place] on 8/20 and was found to be unresponsive, apneic breathing, and decorticate posturing in [place] ED. Patient intubated in ED. LKW was 20:00 on 8/20. As per chart review son stated that about 4 months ago patient had a syncopal episode and received TPA and had some hematuria, but eventual workup showed no evidence of stroke. 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 high grade stenosis. Also noted high grade stenosis vs. occlusion of the proximal left vertebral artery. Patient transferred to [facility] for NeuroIR intervention and taken for mechanical thrombectomy on 8/21. TICI 2B.    MRI brain revealed acute bilateral cerebellar and pontine infarcts. Etiology at this time with competing mechanisms. Appears large artery embolism, however patient with hx of atrial fibrillation and not on anticoagulation which could be contributory as well.     NEURO:   -Neurologically intubated with limited exam. without acute change.   -MRI brain as noted. More diffuse high signal in the mikaela more likely contrast staining  -Continue close monitoring for neurologic deterioration    - Stroke neuro checks q 1 hour as per neuro ICU  -SBP goal 100-160   -ANTITHROMBOTIC THERAPY: due to MRI brain results patient started on ASA 81mg daily. patient with hx of atrial fibrillation. did refuse anticoagulation in the past, however dependent on clinical course and goals of care would obtain head CT on 8/26 to determine timeline for anticoagulation if applicable.   -titrate statin to LDL goal less than 70. . Would initiate high intensity statin when dysphagia screen is passed   -Dysphagia screen: fail. NGT placed.   -Physical therapy/OT/Speech eval/treatment.   -TTE as noted with EF of 37%.  cardiac monitoring w/ telemetry for now, further evaluation pending findings of noted workup  , +/- ILR             - patient should have all age and risk appropriate malignancy screenings with PCP or sooner if clinically suspected    -DVT ppx: Heparin s.c [x] LMWH [] SCD[] - chemoppx initiated in setting of mri brain findings   -maintain adequate hydration    -Na Goal: 135-145   -monitor for si/sx of infection   -Stroke education  -Further care per primary team   -Would suggest GOC conversation with family.     OTHER:  condition and plan of care d/w patient, questions and concerns addressed.     DISPOSITION: Rehab or home depending on PT eval once stable and workup is complete      CORE MEASURES:        Admission NIHSS: 31     Tenecteplase : [] YES [x] NO      LDL/A1C: 109/5.4     Depression Screen- if depression hx and/or present      Statin Therapy: as noted      Dysphagia Screen: [] PASS [] FAIL- pending      Smoking [] YES [] NO      Afib [] YES [] NO     Stroke Education [] YES [] NO- pending

## 2024-08-23 NOTE — PROGRESS NOTE ADULT - NS ATTEND AMEND GEN_ALL_CORE FT
TTE EF 37%, multiple wall motion abnormalities exist.  mild TR. Mild LVH. Moderate MR.   - cannot start ACEi/ARBs/MRA due to HARINDER on CKD.   - no BB at this time 2/2 periods of bradycardia with HR in the 50s    ischemic workup not appropriate at this time given patients intubate and sedated state.  pending neurologic recovery    pending patients neurologic recovery and wishes, please request re-evaluation after the weekend    will follow peripherally over the weekend, please call with acute issues.

## 2024-08-23 NOTE — PROGRESS NOTE ADULT - ASSESSMENT
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   Acute resp failure due to neurologic injury.  AS s/p TAVR, Afib (diagnosed in 2022, refused AC; EF at that time reported as 50%), LBBB, 1st degr AV block; HTN, HLD, CKD.  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  H/o Stage 3 colon cancer in 2009, had surgery; hemorrhoids, h/o LGIB.  Reportedly, with declining mental and physical condition over the last 2 years.  Chano on CKD vs worsening CKD, suspect component of KENYON.    Plan:  - neurochecks  - stroke core measures  - ASA, statin   - SBP goal 100-140; cannot start ACE/ARB/BB in view of renal worsening and episodes of edgar with underlying conduction abnormality; cont Amlodipine  - maintain Osats>92%, vent support, SBT as tolerated  - monitor e-lytes, I/Os, Thomas in place for strict I/Os; repeat UA  - d/c bicarb ggt; add FW   - cont TF, BM regimen, PPI  - maintain -180, ISS  - DVT prophylaxis: SCDs, SQH; restart home iron supplements  - ongoing GOC discussion with family, full code

## 2024-08-23 NOTE — DIETITIAN INITIAL EVALUATION ADULT - ENTERAL
Consider changing TF formula to Pivot 1.5 to meet increased protein needs and change to 18 hrs run time to allow for periods of bowel rest and interruptions in feeding 2/2 clinical course  Start Pivot 1.5 @ 45 cc/hr and increase by 10 cc q 4 hrs until goal rate 70 cc/hr x 18 hrs (1260 ml, 1890 kcals, 118 g pro, 945 ml free water)  Add Maxime BID to aid wound healing (90 kcals, 14 g AA, arginine/glutamine per packet)  Additional free water per medical teams discretion

## 2024-08-23 NOTE — DIETITIAN INITIAL EVALUATION ADULT - NSFNSGIIOFT_GEN_A_CORE
08-22-24 @ 07:01  -  08-23-24 @ 07:00  --------------------------------------------------------  OUT:  Total OUT: 0 mL    Total NET: 1045 mL      08-23-24 @ 07:01  -  08-23-24 @ 09:12  --------------------------------------------------------  OUT:  Total OUT: 0 mL    Total NET: 45 mL

## 2024-08-23 NOTE — PROGRESS NOTE ADULT - SUBJECTIVE AND OBJECTIVE BOX
HPI:  89 Yo M with PMH of HTN, colon cancer, enlarged prostate transfer from [facility] from [facility] for altered mental status. LKW 7PM, Per chart, Patient initially complained of  Nausea,  vomiting and dizziness s/p dinner, later  found to be unresponsive and decorticate posturing in [place] ED. 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 Patient   transferred to [facility] for NeuroIR intervention.   l (21 Aug 2024 03:06)    O/ne events: none reported.     ICU Vital Signs Last 24 Hrs  T(C): 37.6 (23 Aug 2024 12:00), Max: 38 (22 Aug 2024 19:00)  T(F): 99.7 (23 Aug 2024 12:00), Max: 100.4 (22 Aug 2024 19:00)  HR: 88 (23 Aug 2024 12:00) (75 - 104)  BP: 128/71 (23 Aug 2024 12:00) (113/73 - 156/65)  BP(mean): 89 (23 Aug 2024 12:00) (74 - 109)  ABP: --  ABP(mean): --  RR: 16 (23 Aug 2024 12:00) (12 - 18)  SpO2: 97% (23 Aug 2024 12:00) (97% - 100%)    O2 Parameters below as of 23 Aug 2024 12:00  Patient On (Oxygen Delivery Method): ventilator, CPAP    O2 Concentration (%): 30        Exam:  followed some commands (wiggled toes, attempted knee flexion), tracks to the R, PERRL 2 mm BL, o/b the vent, + cough, motor - moves L side spont, RUE weak wdrl, RLE wdrl.  S1S2 present.  CTAB  Abd soft, NT, ND  No peripheral swelling

## 2024-08-23 NOTE — PROGRESS NOTE ADULT - SUBJECTIVE AND OBJECTIVE BOX
[facility]                                                         CARDIOLOGY AT [place] [address]                                                         Telephone: [phone]. Fax:[phone]                                                                             PROGRESS NOTE    Reason for follow up: HF  Last 24h Telemetry: Afib  Overall Plan: GDMT when more stable  renal function worsening      Review of symptoms:   Cardiac:  No chest pain. No dyspnea. No palpitations.  Respiratory: no cough. No dyspnea  Gastrointestinal: No diarrhea. No abdominal pain. No bleeding.   Neuro: No focal neuro complaints.      Vitals:  T(C): 37.4 (08-23-24 @ 09:00), Max: 37.9 (08-22-24 @ 19:00)  HR: 96 (08-23-24 @ 09:00) (64 - 104)  BP: 121/101 (08-23-24 @ 09:00) (109/57 - 156/65)  RR: 15 (08-23-24 @ 09:00) (12 - 20)  SpO2: 99% (08-23-24 @ 09:00) (97% - 100%)      I&O's Summary    22 Aug 2024 07:01  -  23 Aug 2024 07:00  --------------------------------------------------------  IN: 3065.2 mL / OUT: 426 mL / NET: 2639.2 mL    23 Aug 2024 07:01  -  23 Aug 2024 09:58  --------------------------------------------------------  IN: 220 mL / OUT: 30 mL / NET: 190 mL      Weight (kg): 68 (08-21 @ 01:55)      PHYSICAL EXAM:  Appearance: ill appearing  HEENT:  Atraumatic. Normocephalic.  Normal oral mucosa  Neurologic: intubated, unresponsive  Cardiovascular: RRR S1 S2, No murmur, no rubs/gallops.   Respiratory: intubated   Gastrointestinal:  Soft, Non-tender, + BS  Lower Extremities: No edema  Psychiatry: Patient is calm. No agitation.   Skin: warm and dry.        CURRENT CARDIAC MEDICATIONS:  amLODIPine   Tablet 5 milliGRAM(s) Oral daily  hydrALAZINE Injectable 10 milliGRAM(s) IV Push every 2 hours PRN        CURRENT OTHER MEDICATIONS:  fentaNYL    Injectable 25 MICROGram(s) IV Push every 2 hours PRN vent synchrony, resp distress  pantoprazole  Injectable 40 milliGRAM(s) IV Push every 12 hours  polyethylene glycol 3350 17 Gram(s) Oral daily  senna 2 Tablet(s) Oral at bedtime  aspirin  chewable 81 milliGRAM(s) Oral daily  chlorhexidine 0.12% Liquid 15 milliLiter(s) Oral Mucosa every 12 hours  chlorhexidine 2% Cloths 1 Application(s) Topical daily  heparin   Injectable 5000 Unit(s) SubCutaneous every 8 hours  nystatin Powder 1 Application(s) Topical two times a day  tamsulosin 0.4 milliGRAM(s) Oral at bedtime        LABS:	 	                         8.3    13.08 )-----------( 160      ( 23 Aug 2024 04:20 )             25.2     08-23    141  |  107  |  41.1<H>  ----------------------------<  147<H>  4.4   |  22.0  |  2.27<H>    Ca    8.2<L>      23 Aug 2024 04:20  Phos  4.1     08-23  Mg     2.1     08-23    TPro  6.2<L>  /  Alb  3.2<L>  /  TBili  <0.2<L>  /  DBili  0.1  /  AST  22  /  ALT  24  /  AlkPhos  101  08-23    PT/INR/PTT ( 21 Aug 2024 05:28 )                       :                       :      11.4         :       42.8                  .        .                   .              .           .       1.03        .                                       Lipid Profile: Date: 08-21 @ 05:28  Total cholesterol 175; Direct LDL: --; HDL: 49; Triglycerides:87      TSH: Thyroid Stimulating Hormone, Serum: 1.01 uIU/mL      TELEMETRY: Afib      DIAGNOSTIC TESTING:  [ ] Echocardiogram:   < from: TTE W or WO Ultrasound Enhancing Agent (08.21.24 @ 12:49) >  _______________________________________________________________________________________     CONCLUSIONS:      1. Left ventricular cavity is normal in size. Left ventricular systolic function is moderately decreased with an ejection fraction of 37 % by Kern's method of disks.   2. Multiple segmental abnormalitiesexist. See findings.   3. The left ventricular diastolic function is indeterminate.   4. Normal right ventricular cavity size and normal right ventricular systolic function.   5. Left atrium is mildly dilated.   6. The right atrium is normal in size.   7. Moderate mitral regurgitation.   8. Structurally normal mitral valve with normal leaflet excursion.   9. Mild tricuspid regurgitation.  10. Estimated pulmonary artery systolic pressure is 34 mmHg.  11. Lipomatous interatrial septal hypertrophy present.  12. Mild left ventricular hypertrophy.  13. There is mild calcification of the mitral valve annulus.  14. There is no evidence of a left ventricular thrombus.    ________________________________________________________________________________________    < end of copied text >

## 2024-08-23 NOTE — PROGRESS NOTE ADULT - PROBLEM SELECTOR PLAN 1
.  - found to have new onset HFrEF (previous EF in 2022 is 50%)   - TTE EF 37%, multiple wall motion abnormalities exist.  mild TR. Mild LVH. Moderate MR.   - cannot start ACEi/ARBs/MRA due to HARINDER on CKD.   - no BB at this time 2/2 periods of bradycardia with HR in the 50s  - further GDMT as condition improves   - SBP goal per neuroicu is 100-140   - ischemic workup deferred at this time due to recent medical condition   - will follow peripherally over the weekend, please call with acute issues

## 2024-08-23 NOTE — PROVIDER CONTACT NOTE (EICU) - ACTION/TREATMENT ORDERED:
E-alerted by bedside team, requesting CXR for NGT adjustment, order placed as requested, results to be followed up by bedside provider.
E-alerted by bedside team, requesting CXR pt s/p NGT placement, order placed as requested, results to be followed up by bedside provider.

## 2024-08-23 NOTE — PROGRESS NOTE ADULT - ASSESSMENT
89 y/o M with hx of AS s/p TAVR, AFib (dx in 2022, refused AC, EF at that time was reported at 50%), LBBB, 1st AV block, HTN, HLD, CKD, hx of stage 3 colon cancer in 2009 (s/p surgery), and lower GIB presenting from [facility] from [facility] for AMS. 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

## 2024-08-23 NOTE — DIETITIAN INITIAL EVALUATION ADULT - ADD RECOMMEND
Monitor tolerance/adequacy of TF regimen  Rx: Vit C 500 mg daily to promote wound healing/skin integrity  Monitor nutrition related labs, weight trends, BMs and I/Os

## 2024-08-23 NOTE — DIETITIAN INITIAL EVALUATION ADULT - ORAL INTAKE PTA/DIET HISTORY
Consults acknowledged, MST consult flagged for unsure weight loss, skin breakdown noted. Chart reviewed. Pt remains intubated in ICU, TF started via NGT 8/21 Jevity 1.5 @ 10 cc/hr and met current goal rate 45 cc/hr x 24 hrs on 8/22 (1080 ml, 1620 kcals, 69 g pro, 821 ml free water), not meet est increased nutrient needs. No BM documented yet. Current weight 163 lbs. Tube feed recommendations pended for verification.  Consults acknowledged, MST consult flagged for unsure weight loss, skin breakdown noted. Chart reviewed. Pt remains intubated in ICU, TF started via NGT 8/21 Jevity 1.5 @ 10 cc/hr and met current goal rate 45 cc/hr x 24 hrs on 8/22 (1080 ml, 1620 kcals, 69 g pro, 821 ml free water), not meet est increased nutrient needs. No BM documented yet. Current weight 163 lbs. Tube feed recommendations pended for verification.   Addendum 8/23 9:51 am: Reached out to neuro PA via [employer] teams with nutrition recommendations.

## 2024-08-23 NOTE — PROGRESS NOTE ADULT - SUBJECTIVE AND OBJECTIVE BOX
Preliminary note, offical recommendations pending attending review/signature   [facility] Stroke Team  Progress Note     HPI:  89 Yo M with PMH of HTN, colon cancer, and enlarged prostate was transferred from [facility] to [facility] on 8/21. Per chart patient initially complained of nausea,  vomiting and dizziness s/p dinner on 8/20. Was brought to [place] on 8/20 and was found to be unresponsive, apneic breathing, and decorticate posturing in [place] ED. Patient intubated in ED. LKW was 20:00 on 8/20. As per chart review son stated that about 4 months ago patient had a syncopal episode and received TPA and had some hematuria, but eventual workup showed no evidence of stroke. 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 Patient  transferred to [facility] for NeuroIR intervention and taken for mechanical thrombectomy on 8/21. Stroke team called for consult.    SUBJECTIVE: No events overnight.  No new neurologic complaints.  ROS reported negative unless otherwise noted.    amLODIPine   Tablet 5 milliGRAM(s) Oral daily  aspirin  chewable 81 milliGRAM(s) Oral daily  chlorhexidine 0.12% Liquid 15 milliLiter(s) Oral Mucosa every 12 hours  chlorhexidine 2% Cloths 1 Application(s) Topical daily  fentaNYL    Injectable 25 MICROGram(s) IV Push every 2 hours PRN  heparin   Injectable 5000 Unit(s) SubCutaneous every 8 hours  hydrALAZINE Injectable 10 milliGRAM(s) IV Push every 2 hours PRN  nystatin Powder 1 Application(s) Topical two times a day  pantoprazole  Injectable 40 milliGRAM(s) IV Push every 12 hours  polyethylene glycol 3350 17 Gram(s) Oral daily  senna 2 Tablet(s) Oral at bedtime  sodium bicarbonate  Infusion 0.143 mEq/kG/Hr IV Continuous <Continuous>  tamsulosin 0.4 milliGRAM(s) Oral at bedtime      PHYSICAL EXAM:   Vital Signs Last 24 Hrs  T(C): 37.4 (23 Aug 2024 08:00), Max: 37.9 (22 Aug 2024 19:00)  T(F): 99.3 (23 Aug 2024 08:00), Max: 100.2 (22 Aug 2024 19:00)  HR: 78 (23 Aug 2024 08:00) (62 - 104)  BP: 117/67 (23 Aug 2024 08:00) (109/52 - 156/65)  BP(mean): 83 (23 Aug 2024 08:00) (66 - 109)  RR: 12 (23 Aug 2024 08:00) (12 - 20)  SpO2: 100% (23 Aug 2024 08:00) (99% - 100%)    Parameters below as of 23 Aug 2024 08:00  Patient On (Oxygen Delivery Method): ventilator  O2 Flow (L/min): 30    PENDING  Physical Exam:  General: Patient intubated. Sedated to precedex.   Detailed Neurologic Exam:    Mental status: The patient not awake, ly snot open eyes to voice or noxious stimuli. Not oriented to time, self, or place. No verbal output. does not follow commands    Cranial nerves: Pupils pinpoint but minimally reactive to light. Positive corneal refelx. No facial asymmetry. Does not blink to threat in right peripheral field.     Motor: There is normal bulk and tone.  There is no tremor.  LUE: moves antigravity purposefully but not on command  LLE: movement within bed and withdraws to noxious stimuli   RUE: no movement   RLE: movement within bed and withdraws to noxious stimuli       Sensation: intact to noxious stimuli in RLE, LUE and LLE. no sensation to noxious stimuli in RUE.     Cerebellar: There is no dysmetria on finger to nose testing.tested    LABS:                        8.3    13.08 )-----------( 160      ( 23 Aug 2024 04:20 )             25.2    08-23    141  |  107  |  41.1<H>  ----------------------------<  147<H>  4.4   |  22.0  |  2.27<H>    Ca    8.2<L>      23 Aug 2024 04:20  Phos  4.1     08-23  Mg     2.1     08-23    TPro  6.2<L>  /  Alb  3.2<L>  /  TBili  <0.2<L>  /  DBili  0.1  /  AST  22  /  ALT  24  /  AlkPhos  101  08-23      8-21 Chol 175  HDL 49 Trig 87    A1C: 5.4    RADIOLOGY & ADDITIONAL STUDIES (independently reviewed unless otherwise noted):  MR Head No Cont (08.22.24 @ 13:58)   IMPRESSION:  Acute bilateral cerebellar and pontine infarcts as described above.   Susceptibility artifact in the left ventral mikaela suspicious for   hemorrhagic conversion for which a component of was seen on the recent   head CT. Additional more diffuse high signal in the mikaela more likely   contrast staining. Continual follow-up recommended.  No large hemorrhage, vasogenic edema or infarct in the supratentorial   brain parenchyma. Chronic lacunar infarcts in the bilateral corona   radiata and moderate severity chronic microvascular changes.    CT Head No Cont (08.21.24 @ 15:34)   IMPRESSION:  1.  Redemonstrated hyperintensity within the mikaela, mildly decreased in   attenuation when compared to prior imaging. Finding thought to represent   contrast staining and/or petechial hemorrhage. Continued follow-up   imaging as clinically indicated.  2.  Evolving infarct in the left cerebellar hemisphere.  3.  Chronic ischemic changes discussed above.    CT Head No Cont (08.21.24 @ 08:09)   IMPRESSION:  Increased attenuation within the mikaela, new compared to prior imaging.   Finding likely compatible with a developing hemorrhagic conversion of   patient's known infarct and/or contrast staining. Recommend close   interval imaging for further evaluation.    Head CT 8/20:  IMPRESSION:  No evidence of acute intracranial hemorrhage, midline shift or CT evidence  of acute territorial infarct.    CTA Head and Neck and CTP:   IMPRESSION:  CT PERFUSION: Perfusion imaging predicts a core infarct of 0 cc within the  bilateral cerebral hemispheres.? Based on the perfusion mismatch, there is a  predicted volume of 61 cc penumbra of tissue at risk.    These findings are nonspecific and may be artifactual in nature. MRI  suggested for further evaluation.    CTA COW: Lack of opacification of the mid to distal basilar artery, this  may be due to poor contrast opacification, however, suspicious for  high-grade stenosis/occlusion.    CTA NECK: Calcified plaque. Patent, ECAs, ICAs, no hemodynamically  significant stenosis at ICA origins by NASCET criteria.    Lack of opacification of the proximal left vertebral artery, this may be due  to poor contrast opacification, however, high-grade stenosis/occlusion is  not excluded.    Endotracheal tube with its distal tip above the level of the judson.    Nonspecific thickening of the esophagus.     TTE W or WO Ultrasound Enhancing Agent (08.21.24 @ 12:49)   CONCLUSIONS:   1. Left ventricular cavity is normal in size. Left ventricular systolic function is moderately decreased with an ejection fraction of 37 % by Kern's method of disks.   2. Multiple segmental abnormalitiesexist. See findings.   3. The left ventricular diastolic function is indeterminate.   4. Normal right ventricular cavity size and normal right ventricular systolic function.   5. Left atrium is mildly dilated.   6. The right atrium is normal in size.   7. Moderate mitral regurgitation.   8. Structurally normal mitral valve with normal leaflet excursion.   9. Mild tricuspid regurgitation.  10. Estimated pulmonary artery systolic pressure is 34 mmHg.  11. Lipomatous interatrial septal hypertrophy present.  12. Mild left ventricular hypertrophy.  13. There is mild calcification of the mitral valve annulus.  14. There is no evidence of a left ventricular thrombus.         Preliminary note, offical recommendations pending attending review/signature   [facility] Stroke Team  Progress Note     HPI:  89 Yo M with PMH of HTN, colon cancer, and enlarged prostate was transferred from [facility] to [facility] on 8/21. Per chart patient initially complained of nausea,  vomiting and dizziness s/p dinner on 8/20. Was brought to [place] on 8/20 and was found to be unresponsive, apneic breathing, and decorticate posturing in [place] ED. Patient intubated in ED. LKW was 20:00 on 8/20. As per chart review son stated that about 4 months ago patient had a syncopal episode and received TPA and had some hematuria, but eventual workup showed no evidence of stroke. 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 Patient  transferred to [facility] for NeuroIR intervention and taken for mechanical thrombectomy on 8/21. Stroke team called for consult.    SUBJECTIVE: No events overnight.  No new neurologic complaints.  ROS reported negative unless otherwise noted.    amLODIPine   Tablet 5 milliGRAM(s) Oral daily  aspirin  chewable 81 milliGRAM(s) Oral daily  chlorhexidine 0.12% Liquid 15 milliLiter(s) Oral Mucosa every 12 hours  chlorhexidine 2% Cloths 1 Application(s) Topical daily  fentaNYL    Injectable 25 MICROGram(s) IV Push every 2 hours PRN  heparin   Injectable 5000 Unit(s) SubCutaneous every 8 hours  hydrALAZINE Injectable 10 milliGRAM(s) IV Push every 2 hours PRN  nystatin Powder 1 Application(s) Topical two times a day  pantoprazole  Injectable 40 milliGRAM(s) IV Push every 12 hours  polyethylene glycol 3350 17 Gram(s) Oral daily  senna 2 Tablet(s) Oral at bedtime  sodium bicarbonate  Infusion 0.143 mEq/kG/Hr IV Continuous <Continuous>  tamsulosin 0.4 milliGRAM(s) Oral at bedtime      PHYSICAL EXAM:   Vital Signs Last 24 Hrs  T(C): 37.4 (23 Aug 2024 08:00), Max: 37.9 (22 Aug 2024 19:00)  T(F): 99.3 (23 Aug 2024 08:00), Max: 100.2 (22 Aug 2024 19:00)  HR: 78 (23 Aug 2024 08:00) (62 - 104)  BP: 117/67 (23 Aug 2024 08:00) (109/52 - 156/65)  BP(mean): 83 (23 Aug 2024 08:00) (66 - 109)  RR: 12 (23 Aug 2024 08:00) (12 - 20)  SpO2: 100% (23 Aug 2024 08:00) (99% - 100%)    Parameters below as of 23 Aug 2024 08:00  Patient On (Oxygen Delivery Method): ventilator  O2 Flow (L/min): 30    PENDING  Physical Exam:  General: Patient intubated. Sedated to precedex.   Detailed Neurologic Exam:    Mental status: The patient not awake, ly snot open eyes to voice or noxious stimuli. Not oriented to time, self, or place. No verbal output. does not follow commands    Cranial nerves: Pupils pinpoint but minimally reactive to light. Positive corneal refelx. No facial asymmetry. Does not blink to threat in right peripheral field.     Motor: There is normal bulk and tone.  There is no tremor.  LUE: moves antigravity purposefully but not on command  LLE: movement within bed and withdraws to noxious stimuli   RUE: no movement   RLE: movement within bed and withdraws to noxious stimuli       Sensation: intact to noxious stimuli in RLE, LUE and LLE. no sensation to noxious stimuli in RUE.     Cerebellar: There is no dysmetria on finger to nose testing.tested    LABS:                        8.3    13.08 )-----------( 160      ( 23 Aug 2024 04:20 )             25.2    08-23    141  |  107  |  41.1<H>  ----------------------------<  147<H>  4.4   |  22.0  |  2.27<H>    Ca    8.2<L>      23 Aug 2024 04:20  Phos  4.1     08-23  Mg     2.1     08-23    TPro  6.2<L>  /  Alb  3.2<L>  /  TBili  <0.2<L>  /  DBili  0.1  /  AST  22  /  ALT  24  /  AlkPhos  101  08-23      8-21 Chol 175  HDL 49 Trig 87    A1C: 5.4    RADIOLOGY & ADDITIONAL STUDIES (independently reviewed unless otherwise noted):  MR Head No Cont (08.22.24 @ 13:58)   IMPRESSION:  Acute bilateral cerebellar and pontine infarcts as described above.   Susceptibility artifact in the left ventral mikaela suspicious for   hemorrhagic conversion for which a component of was seen on the recent   head CT. Additional more diffuse high signal in the mikaela more likely   contrast staining. Continual follow-up recommended.  No large hemorrhage, vasogenic edema or infarct in the supratentorial   brain parenchyma. Chronic lacunar infarcts in the bilateral corona   radiata and moderate severity chronic microvascular changes.    CT Head No Cont (08.21.24 @ 15:34)   IMPRESSION:  1.  Redemonstrated hyperintensity within the mikaela, mildly decreased in   attenuation when compared to prior imaging. Finding thought to represent   contrast staining and/or petechial hemorrhage. Continued follow-up   imaging as clinically indicated.  2.  Evolving infarct in the left cerebellar hemisphere.  3.  Chronic ischemic changes discussed above.    CT Head No Cont (08.21.24 @ 08:09)   IMPRESSION:  Increased attenuation within the mikaela, new compared to prior imaging.   Finding likely compatible with a developing hemorrhagic conversion of   patient's known infarct and/or contrast staining. Recommend close   interval imaging for further evaluation.    Head CT 8/20:  IMPRESSION:  No evidence of acute intracranial hemorrhage, midline shift or CT evidence  of acute territorial infarct.    CTA Head and Neck and CTP:   IMPRESSION:  CT PERFUSION: Perfusion imaging predicts a core infarct of 0 cc within the  bilateral cerebral hemispheres.? Based on the perfusion mismatch, there is a  predicted volume of 61 cc penumbra of tissue at risk.    These findings are nonspecific and may be artifactual in nature. MRI  suggested for further evaluation.    CTA COW: Lack of opacification of the mid to distal basilar artery, this  may be due to poor contrast opacification, however, suspicious for  high-grade stenosis/occlusion.    CTA NECK: Calcified plaque. Patent, ECAs, ICAs, no hemodynamically  significant stenosis at ICA origins by NASCET criteria.    Lack of opacification of the proximal left vertebral artery, this may be due  to poor contrast opacification, however, high-grade stenosis/occlusion is  not excluded.    Endotracheal tube with its distal tip above the level of the judson.    Nonspecific thickening of the esophagus.     TTE W or WO Ultrasound Enhancing Agent (08.21.24 @ 12:49)   CONCLUSIONS:   1. Left ventricular cavity is normal in size. Left ventricular systolic function is moderately decreased with an ejection fraction of 37 % by Kern's method of disks.   2. Multiple segmental abnormalitiesexist. See findings.   3. The left ventricular diastolic function is indeterminate.   4. Normal right ventricular cavity size and normal right ventricular systolic function.   5. Left atrium is mildly dilated.   6. The right atrium is normal in size.   7. Moderate mitral regurgitation.   8. Structurally normal mitral valve with normal leaflet excursion.   9. Mild tricuspid regurgitation.  10. Estimated pulmonary artery systolic pressure is 34 mmHg.  11. Lipomatous interatrial septal hypertrophy present.  12. Mild left ventricular hypertrophy.  13. There is mild calcification of the mitral valve annulus.  14. There is no evidence of a left ventricular thrombus.         Preliminary note, offical recommendations pending attending review/signature   [facility] Stroke Team  Progress Note     HPI:  89 Yo M with PMH of HTN, colon cancer, afib(diagnosed in 2022 and refused anticoagulation), and enlarged prostate was transferred from [facility] to [facility] on 8/21. Per chart patient initially complained of nausea,  vomiting and dizziness s/p dinner on 8/20. Was brought to [place] on 8/20 and was found to be unresponsive, apneic breathing, and decorticate posturing in [place] ED. Patient intubated in ED. LKW was 20:00 on 8/20. As per chart review son stated that about 4 months ago patient had a syncopal episode and received TPA and had some hematuria, but eventual workup showed no evidence of stroke. 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 Patient  transferred to [facility] for NeuroIR intervention and taken for mechanical thrombectomy on 8/21. Stroke team called for consult.    SUBJECTIVE: No events overnight.  No new neurologic complaints.  ROS reported negative unless otherwise noted.    amLODIPine   Tablet 5 milliGRAM(s) Oral daily  aspirin  chewable 81 milliGRAM(s) Oral daily  chlorhexidine 0.12% Liquid 15 milliLiter(s) Oral Mucosa every 12 hours  chlorhexidine 2% Cloths 1 Application(s) Topical daily  fentaNYL    Injectable 25 MICROGram(s) IV Push every 2 hours PRN  heparin   Injectable 5000 Unit(s) SubCutaneous every 8 hours  hydrALAZINE Injectable 10 milliGRAM(s) IV Push every 2 hours PRN  nystatin Powder 1 Application(s) Topical two times a day  pantoprazole  Injectable 40 milliGRAM(s) IV Push every 12 hours  polyethylene glycol 3350 17 Gram(s) Oral daily  senna 2 Tablet(s) Oral at bedtime  sodium bicarbonate  Infusion 0.143 mEq/kG/Hr IV Continuous <Continuous>  tamsulosin 0.4 milliGRAM(s) Oral at bedtime      PHYSICAL EXAM:   Vital Signs Last 24 Hrs  T(C): 37.4 (23 Aug 2024 08:00), Max: 37.9 (22 Aug 2024 19:00)  T(F): 99.3 (23 Aug 2024 08:00), Max: 100.2 (22 Aug 2024 19:00)  HR: 78 (23 Aug 2024 08:00) (62 - 104)  BP: 117/67 (23 Aug 2024 08:00) (109/52 - 156/65)  BP(mean): 83 (23 Aug 2024 08:00) (66 - 109)  RR: 12 (23 Aug 2024 08:00) (12 - 20)  SpO2: 100% (23 Aug 2024 08:00) (99% - 100%)    Parameters below as of 23 Aug 2024 08:00  Patient On (Oxygen Delivery Method): ventilator  O2 Flow (L/min): 30      Physical Exam:  General: Patient intubated.   Detailed Neurologic Exam:    Mental status: The patient not awake, does not open eyes to voice or noxious stimuli. Not oriented to time, self, or place. No verbal output. does not follow commands    Cranial nerves: Pupils pinpoint but minimally reactive to light. Resists eye opening.  No facial asymmetry. Does not blink to threat in right peripheral field.     Motor: There is normal bulk and tone.  There is no tremor.  LUE: moves antigravity purposefully but not on command  LLE: movement within bed and withdraws to noxious stimuli   RUE: withdraws to noxious stimuli   RLE: movement within bed and withdraws to noxious stimuli     Sensation: intact to noxious stimuli in RLE, LUE, and RUE.     Cerebellar: There is no dysmetria on finger to nose testing.    Gait: not tested    LABS:                        8.3    13.08 )-----------( 160      ( 23 Aug 2024 04:20 )             25.2    08-23    141  |  107  |  41.1<H>  ----------------------------<  147<H>  4.4   |  22.0  |  2.27<H>    Ca    8.2<L>      23 Aug 2024 04:20  Phos  4.1     08-23  Mg     2.1     08-23    TPro  6.2<L>  /  Alb  3.2<L>  /  TBili  <0.2<L>  /  DBili  0.1  /  AST  22  /  ALT  24  /  AlkPhos  101  08-23      8-21 Chol 175  HDL 49 Trig 87    A1C: 5.4    RADIOLOGY & ADDITIONAL STUDIES (independently reviewed unless otherwise noted):  MR Head No Cont (08.22.24 @ 13:58)   IMPRESSION:  Acute bilateral cerebellar and pontine infarcts as described above.   Susceptibility artifact in the left ventral mikaela suspicious for   hemorrhagic conversion for which a component of was seen on the recent   head CT. Additional more diffuse high signal in the mikaela more likely   contrast staining. Continual follow-up recommended.  No large hemorrhage, vasogenic edema or infarct in the supratentorial   brain parenchyma. Chronic lacunar infarcts in the bilateral corona   radiata and moderate severity chronic microvascular changes.    CT Head No Cont (08.21.24 @ 15:34)   IMPRESSION:  1.  Redemonstrated hyperintensity within the mikaela, mildly decreased in   attenuation when compared to prior imaging. Finding thought to represent   contrast staining and/or petechial hemorrhage. Continued follow-up   imaging as clinically indicated.  2.  Evolving infarct in the left cerebellar hemisphere.  3.  Chronic ischemic changes discussed above.    CT Head No Cont (08.21.24 @ 08:09)   IMPRESSION:  Increased attenuation within the mikaela, new compared to prior imaging.   Finding likely compatible with a developing hemorrhagic conversion of   patient's known infarct and/or contrast staining. Recommend close   interval imaging for further evaluation.    Head CT 8/20:  IMPRESSION:  No evidence of acute intracranial hemorrhage, midline shift or CT evidence  of acute territorial infarct.    CTA Head and Neck and CTP:   IMPRESSION:  CT PERFUSION: Perfusion imaging predicts a core infarct of 0 cc within the  bilateral cerebral hemispheres.? Based on the perfusion mismatch, there is a  predicted volume of 61 cc penumbra of tissue at risk.    These findings are nonspecific and may be artifactual in nature. MRI  suggested for further evaluation.    CTA COW: Lack of opacification of the mid to distal basilar artery, this  may be due to poor contrast opacification, however, suspicious for  high-grade stenosis/occlusion.    CTA NECK: Calcified plaque. Patent, ECAs, ICAs, no hemodynamically  significant stenosis at ICA origins by NASCET criteria.    Lack of opacification of the proximal left vertebral artery, this may be due  to poor contrast opacification, however, high-grade stenosis/occlusion is  not excluded.    Endotracheal tube with its distal tip above the level of the judson.    Nonspecific thickening of the esophagus.     TTE W or WO Ultrasound Enhancing Agent (08.21.24 @ 12:49)   CONCLUSIONS:   1. Left ventricular cavity is normal in size. Left ventricular systolic function is moderately decreased with an ejection fraction of 37 % by Kern's method of disks.   2. Multiple segmental abnormalitiesexist. See findings.   3. The left ventricular diastolic function is indeterminate.   4. Normal right ventricular cavity size and normal right ventricular systolic function.   5. Left atrium is mildly dilated.   6. The right atrium is normal in size.   7. Moderate mitral regurgitation.   8. Structurally normal mitral valve with normal leaflet excursion.   9. Mild tricuspid regurgitation.  10. Estimated pulmonary artery systolic pressure is 34 mmHg.  11. Lipomatous interatrial septal hypertrophy present.  12. Mild left ventricular hypertrophy.  13. There is mild calcification of the mitral valve annulus.  14. There is no evidence of a left ventricular thrombus.           [facility] Stroke Team  Progress Note     HPI:  89 Yo M with PMH of HTN, colon cancer, afib(diagnosed in 2022 and refused anticoagulation), and enlarged prostate was transferred from [facility] to [facility] on 8/21. Per chart patient initially complained of nausea,  vomiting and dizziness s/p dinner on 8/20. Was brought to [place] on 8/20 and was found to be unresponsive, apneic breathing, and decorticate posturing in [place] ED. Patient intubated in ED. LKW was 20:00 on 8/20. As per chart review son stated that about 4 months ago patient had a syncopal episode and received TPA and had some hematuria, but eventual workup showed no evidence of stroke. 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 Patient  transferred to [facility] for NeuroIR intervention and taken for mechanical thrombectomy on 8/21. Stroke team called for consult.    SUBJECTIVE: No events overnight.  No new neurologic complaints.  ROS reported negative unless otherwise noted.    amLODIPine   Tablet 5 milliGRAM(s) Oral daily  aspirin  chewable 81 milliGRAM(s) Oral daily  chlorhexidine 0.12% Liquid 15 milliLiter(s) Oral Mucosa every 12 hours  chlorhexidine 2% Cloths 1 Application(s) Topical daily  fentaNYL    Injectable 25 MICROGram(s) IV Push every 2 hours PRN  heparin   Injectable 5000 Unit(s) SubCutaneous every 8 hours  hydrALAZINE Injectable 10 milliGRAM(s) IV Push every 2 hours PRN  nystatin Powder 1 Application(s) Topical two times a day  pantoprazole  Injectable 40 milliGRAM(s) IV Push every 12 hours  polyethylene glycol 3350 17 Gram(s) Oral daily  senna 2 Tablet(s) Oral at bedtime  sodium bicarbonate  Infusion 0.143 mEq/kG/Hr IV Continuous <Continuous>  tamsulosin 0.4 milliGRAM(s) Oral at bedtime      PHYSICAL EXAM:   Vital Signs Last 24 Hrs  T(C): 37.4 (23 Aug 2024 08:00), Max: 37.9 (22 Aug 2024 19:00)  T(F): 99.3 (23 Aug 2024 08:00), Max: 100.2 (22 Aug 2024 19:00)  HR: 78 (23 Aug 2024 08:00) (62 - 104)  BP: 117/67 (23 Aug 2024 08:00) (109/52 - 156/65)  BP(mean): 83 (23 Aug 2024 08:00) (66 - 109)  RR: 12 (23 Aug 2024 08:00) (12 - 20)  SpO2: 100% (23 Aug 2024 08:00) (99% - 100%)    Parameters below as of 23 Aug 2024 08:00  Patient On (Oxygen Delivery Method): ventilator  O2 Flow (L/min): 30      Physical Exam:  General: Patient intubated.   Detailed Neurologic Exam:    Mental status: The patient not awake, does not open eyes to voice or noxious stimuli. Not oriented to time, self, or place. No verbal output. does not follow commands    Cranial nerves: Pupils pinpoint but minimally reactive to light. Resists eye opening.  No facial asymmetry. Does not blink to threat in right peripheral field.     Motor: There is normal bulk and tone.  There is no tremor.  LUE: moves antigravity purposefully but not on command  LLE: movement within bed and withdraws to noxious stimuli   RUE: withdraws to noxious stimuli   RLE: movement within bed and withdraws to noxious stimuli     Sensation: intact to noxious stimuli in RLE, LUE, and RUE.     Cerebellar: There is no dysmetria on finger to nose testing.    Gait: not tested    LABS:                        8.3    13.08 )-----------( 160      ( 23 Aug 2024 04:20 )             25.2    08-23    141  |  107  |  41.1<H>  ----------------------------<  147<H>  4.4   |  22.0  |  2.27<H>    Ca    8.2<L>      23 Aug 2024 04:20  Phos  4.1     08-23  Mg     2.1     08-23    TPro  6.2<L>  /  Alb  3.2<L>  /  TBili  <0.2<L>  /  DBili  0.1  /  AST  22  /  ALT  24  /  AlkPhos  101  08-23      8-21 Chol 175  HDL 49 Trig 87    A1C: 5.4    RADIOLOGY & ADDITIONAL STUDIES (independently reviewed unless otherwise noted):  MR Head No Cont (08.22.24 @ 13:58)   IMPRESSION:  Acute bilateral cerebellar and pontine infarcts as described above.   Susceptibility artifact in the left ventral mikaela suspicious for   hemorrhagic conversion for which a component of was seen on the recent   head CT. Additional more diffuse high signal in the mikaela more likely   contrast staining. Continual follow-up recommended.  No large hemorrhage, vasogenic edema or infarct in the supratentorial   brain parenchyma. Chronic lacunar infarcts in the bilateral corona   radiata and moderate severity chronic microvascular changes.    CT Head No Cont (08.21.24 @ 15:34)   IMPRESSION:  1.  Redemonstrated hyperintensity within the mikaela, mildly decreased in   attenuation when compared to prior imaging. Finding thought to represent   contrast staining and/or petechial hemorrhage. Continued follow-up   imaging as clinically indicated.  2.  Evolving infarct in the left cerebellar hemisphere.  3.  Chronic ischemic changes discussed above.    CT Head No Cont (08.21.24 @ 08:09)   IMPRESSION:  Increased attenuation within the mikaela, new compared to prior imaging.   Finding likely compatible with a developing hemorrhagic conversion of   patient's known infarct and/or contrast staining. Recommend close   interval imaging for further evaluation.    Head CT 8/20:  IMPRESSION:  No evidence of acute intracranial hemorrhage, midline shift or CT evidence  of acute territorial infarct.    CTA Head and Neck and CTP:   IMPRESSION:  CT PERFUSION: Perfusion imaging predicts a core infarct of 0 cc within the  bilateral cerebral hemispheres.? Based on the perfusion mismatch, there is a  predicted volume of 61 cc penumbra of tissue at risk.    These findings are nonspecific and may be artifactual in nature. MRI  suggested for further evaluation.    CTA COW: Lack of opacification of the mid to distal basilar artery, this  may be due to poor contrast opacification, however, suspicious for  high-grade stenosis/occlusion.    CTA NECK: Calcified plaque. Patent, ECAs, ICAs, no hemodynamically  significant stenosis at ICA origins by NASCET criteria.    Lack of opacification of the proximal left vertebral artery, this may be due  to poor contrast opacification, however, high-grade stenosis/occlusion is  not excluded.    Endotracheal tube with its distal tip above the level of the judson.    Nonspecific thickening of the esophagus.     TTE W or WO Ultrasound Enhancing Agent (08.21.24 @ 12:49)   CONCLUSIONS:   1. Left ventricular cavity is normal in size. Left ventricular systolic function is moderately decreased with an ejection fraction of 37 % by Kern's method of disks.   2. Multiple segmental abnormalitiesexist. See findings.   3. The left ventricular diastolic function is indeterminate.   4. Normal right ventricular cavity size and normal right ventricular systolic function.   5. Left atrium is mildly dilated.   6. The right atrium is normal in size.   7. Moderate mitral regurgitation.   8. Structurally normal mitral valve with normal leaflet excursion.   9. Mild tricuspid regurgitation.  10. Estimated pulmonary artery systolic pressure is 34 mmHg.  11. Lipomatous interatrial septal hypertrophy present.  12. Mild left ventricular hypertrophy.  13. There is mild calcification of the mitral valve annulus.  14. There is no evidence of a left ventricular thrombus.

## 2024-08-24 LAB
ALBUMIN SERPL ELPH-MCNC: 2.8 G/DL — LOW (ref 3.3–5.2)
ALP SERPL-CCNC: 102 U/L — SIGNIFICANT CHANGE UP (ref 40–120)
ALT FLD-CCNC: 36 U/L — SIGNIFICANT CHANGE UP
ANION GAP SERPL CALC-SCNC: 14 MMOL/L — SIGNIFICANT CHANGE UP (ref 5–17)
AST SERPL-CCNC: 40 U/L — HIGH
BASOPHILS # BLD AUTO: 0.02 K/UL — SIGNIFICANT CHANGE UP (ref 0–0.2)
BASOPHILS NFR BLD AUTO: 0.2 % — SIGNIFICANT CHANGE UP (ref 0–2)
BILIRUB SERPL-MCNC: <0.2 MG/DL — LOW (ref 0.4–2)
BUN SERPL-MCNC: 41.2 MG/DL — HIGH (ref 8–20)
CALCIUM SERPL-MCNC: 7.9 MG/DL — LOW (ref 8.4–10.5)
CHLORIDE SERPL-SCNC: 105 MMOL/L — SIGNIFICANT CHANGE UP (ref 96–108)
CK MB CFR SERPL CALC: 1.9 NG/ML — SIGNIFICANT CHANGE UP (ref 0–6.7)
CK SERPL-CCNC: 162 U/L — SIGNIFICANT CHANGE UP (ref 30–200)
CO2 SERPL-SCNC: 21 MMOL/L — LOW (ref 22–29)
CREAT SERPL-MCNC: 1.86 MG/DL — HIGH (ref 0.5–1.3)
CULTURE RESULTS: NO GROWTH — SIGNIFICANT CHANGE UP
EGFR: 34 ML/MIN/1.73M2 — LOW
EOSINOPHIL # BLD AUTO: 0.11 K/UL — SIGNIFICANT CHANGE UP (ref 0–0.5)
EOSINOPHIL NFR BLD AUTO: 1.2 % — SIGNIFICANT CHANGE UP (ref 0–6)
FERRITIN SERPL-MCNC: 149 NG/ML — SIGNIFICANT CHANGE UP (ref 30–400)
FOLATE SERPL-MCNC: 15.1 NG/ML — SIGNIFICANT CHANGE UP
GAS PNL BLDA: SIGNIFICANT CHANGE UP
GLUCOSE SERPL-MCNC: 137 MG/DL — HIGH (ref 70–99)
HCT VFR BLD CALC: 24.3 % — LOW (ref 39–50)
HCT VFR BLD CALC: 25 % — LOW (ref 39–50)
HGB BLD-MCNC: 7.8 G/DL — LOW (ref 13–17)
HGB BLD-MCNC: 7.8 G/DL — LOW (ref 13–17)
IMM GRANULOCYTES NFR BLD AUTO: 0.5 % — SIGNIFICANT CHANGE UP (ref 0–0.9)
IRON SATN MFR SERPL: 16 UG/DL — LOW (ref 59–158)
IRON SATN MFR SERPL: 7 % — LOW (ref 16–55)
LACTATE SERPL-SCNC: 1.3 MMOL/L — SIGNIFICANT CHANGE UP (ref 0.5–2)
LYMPHOCYTES # BLD AUTO: 0.79 K/UL — LOW (ref 1–3.3)
LYMPHOCYTES # BLD AUTO: 8.7 % — LOW (ref 13–44)
MAGNESIUM SERPL-MCNC: 2.3 MG/DL — SIGNIFICANT CHANGE UP (ref 1.6–2.6)
MCHC RBC-ENTMCNC: 27.3 PG — SIGNIFICANT CHANGE UP (ref 27–34)
MCHC RBC-ENTMCNC: 28 PG — SIGNIFICANT CHANGE UP (ref 27–34)
MCHC RBC-ENTMCNC: 31.2 GM/DL — LOW (ref 32–36)
MCHC RBC-ENTMCNC: 32.1 GM/DL — SIGNIFICANT CHANGE UP (ref 32–36)
MCV RBC AUTO: 87.1 FL — SIGNIFICANT CHANGE UP (ref 80–100)
MCV RBC AUTO: 87.4 FL — SIGNIFICANT CHANGE UP (ref 80–100)
MONOCYTES # BLD AUTO: 0.59 K/UL — SIGNIFICANT CHANGE UP (ref 0–0.9)
MONOCYTES NFR BLD AUTO: 6.5 % — SIGNIFICANT CHANGE UP (ref 2–14)
NEUTROPHILS # BLD AUTO: 7.55 K/UL — HIGH (ref 1.8–7.4)
NEUTROPHILS NFR BLD AUTO: 82.9 % — HIGH (ref 43–77)
PHOSPHATE SERPL-MCNC: 4.2 MG/DL — SIGNIFICANT CHANGE UP (ref 2.4–4.7)
PLATELET # BLD AUTO: 128 K/UL — LOW (ref 150–400)
PLATELET # BLD AUTO: 161 K/UL — SIGNIFICANT CHANGE UP (ref 150–400)
POTASSIUM SERPL-MCNC: 4.4 MMOL/L — SIGNIFICANT CHANGE UP (ref 3.5–5.3)
POTASSIUM SERPL-SCNC: 4.4 MMOL/L — SIGNIFICANT CHANGE UP (ref 3.5–5.3)
PROCALCITONIN SERPL-MCNC: 0.11 NG/ML — HIGH (ref 0.02–0.1)
PROT SERPL-MCNC: 5.8 G/DL — LOW (ref 6.6–8.7)
RBC # BLD: 2.79 M/UL — LOW (ref 4.2–5.8)
RBC # BLD: 2.79 M/UL — LOW (ref 4.2–5.8)
RBC # BLD: 2.86 M/UL — LOW (ref 4.2–5.8)
RBC # FLD: 15.3 % — HIGH (ref 10.3–14.5)
RBC # FLD: 15.3 % — HIGH (ref 10.3–14.5)
RETICS #: 29 K/UL — SIGNIFICANT CHANGE UP (ref 25–125)
RETICS/RBC NFR: 1 % — SIGNIFICANT CHANGE UP (ref 0.5–2.5)
SODIUM SERPL-SCNC: 140 MMOL/L — SIGNIFICANT CHANGE UP (ref 135–145)
SPECIMEN SOURCE: SIGNIFICANT CHANGE UP
TIBC SERPL-MCNC: 219 UG/DL — LOW (ref 220–430)
TRANSFERRIN SERPL-MCNC: 153 MG/DL — LOW (ref 180–329)
VIT B12 SERPL-MCNC: 678 PG/ML — SIGNIFICANT CHANGE UP (ref 232–1245)
WBC # BLD: 10.14 K/UL — SIGNIFICANT CHANGE UP (ref 3.8–10.5)
WBC # BLD: 9.11 K/UL — SIGNIFICANT CHANGE UP (ref 3.8–10.5)
WBC # FLD AUTO: 10.14 K/UL — SIGNIFICANT CHANGE UP (ref 3.8–10.5)
WBC # FLD AUTO: 9.11 K/UL — SIGNIFICANT CHANGE UP (ref 3.8–10.5)

## 2024-08-24 PROCEDURE — 71045 X-RAY EXAM CHEST 1 VIEW: CPT | Mod: 26

## 2024-08-24 PROCEDURE — 99291 CRITICAL CARE FIRST HOUR: CPT

## 2024-08-24 RX ORDER — ACETAMINOPHEN 325 MG/1
1000 TABLET ORAL ONCE
Refills: 0 | Status: COMPLETED | OUTPATIENT
Start: 2024-08-24 | End: 2024-08-24

## 2024-08-24 RX ORDER — HYDRALAZINE HCL 50 MG
10 TABLET ORAL
Refills: 0 | Status: DISCONTINUED | OUTPATIENT
Start: 2024-08-24 | End: 2024-08-25

## 2024-08-24 RX ADMIN — Medication 20 MILLIGRAM(S): at 13:16

## 2024-08-24 RX ADMIN — Medication 40 MILLIGRAM(S): at 18:14

## 2024-08-24 RX ADMIN — CHLORHEXIDINE GLUCONATE 15 MILLILITER(S): 40 SOLUTION TOPICAL at 05:06

## 2024-08-24 RX ADMIN — CHLORHEXIDINE GLUCONATE 1 APPLICATION(S): 40 SOLUTION TOPICAL at 13:17

## 2024-08-24 RX ADMIN — Medication 2.5 MILLIGRAM(S): at 15:36

## 2024-08-24 RX ADMIN — AMLODIPINE BESYLATE 5 MILLIGRAM(S): 10 TABLET ORAL at 05:05

## 2024-08-24 RX ADMIN — DEXMEDETOMIDINE HYDROCHLORIDE IN 0.9% SODIUM CHLORIDE 3.4 MICROGRAM(S)/KG/HR: 4 INJECTION INTRAVENOUS at 20:30

## 2024-08-24 RX ADMIN — Medication 300 MILLIGRAM(S): at 13:16

## 2024-08-24 RX ADMIN — Medication 1 APPLICATION(S): at 05:05

## 2024-08-24 RX ADMIN — Medication 10 MILLIGRAM(S): at 13:16

## 2024-08-24 RX ADMIN — ACETAMINOPHEN 400 MILLIGRAM(S): 325 TABLET ORAL at 20:50

## 2024-08-24 RX ADMIN — POLYETHYLENE GLYCOL 3350 17 GRAM(S): 17 POWDER, FOR SOLUTION ORAL at 13:16

## 2024-08-24 RX ADMIN — Medication 5000 UNIT(S): at 01:01

## 2024-08-24 RX ADMIN — CHLORHEXIDINE GLUCONATE 15 MILLILITER(S): 40 SOLUTION TOPICAL at 18:13

## 2024-08-24 RX ADMIN — FENTANYL CITRATE 25 MICROGRAM(S): 50 INJECTION INTRAMUSCULAR; INTRAVENOUS at 01:15

## 2024-08-24 RX ADMIN — FENTANYL CITRATE 25 MICROGRAM(S): 50 INJECTION INTRAMUSCULAR; INTRAVENOUS at 01:08

## 2024-08-24 RX ADMIN — Medication 81 MILLIGRAM(S): at 13:15

## 2024-08-24 RX ADMIN — Medication 40 MILLIGRAM(S): at 05:05

## 2024-08-24 RX ADMIN — TAMSULOSIN HYDROCHLORIDE 0.4 MILLIGRAM(S): 0.4 CAPSULE ORAL at 22:03

## 2024-08-24 RX ADMIN — FENTANYL CITRATE 25 MICROGRAM(S): 50 INJECTION INTRAMUSCULAR; INTRAVENOUS at 05:25

## 2024-08-24 RX ADMIN — ACETAMINOPHEN 1000 MILLIGRAM(S): 325 TABLET ORAL at 21:30

## 2024-08-24 RX ADMIN — Medication 5000 UNIT(S): at 13:15

## 2024-08-24 RX ADMIN — Medication 4 MILLILITER(S): at 15:35

## 2024-08-24 RX ADMIN — Medication 4 MILLILITER(S): at 02:35

## 2024-08-24 RX ADMIN — Medication 2.5 MILLIGRAM(S): at 02:35

## 2024-08-24 RX ADMIN — Medication 4 MILLILITER(S): at 08:35

## 2024-08-24 RX ADMIN — FENTANYL CITRATE 25 MICROGRAM(S): 50 INJECTION INTRAMUSCULAR; INTRAVENOUS at 05:55

## 2024-08-24 RX ADMIN — Medication 5000 UNIT(S): at 18:14

## 2024-08-24 RX ADMIN — Medication 2.5 MILLIGRAM(S): at 08:35

## 2024-08-24 RX ADMIN — Medication 2 TABLET(S): at 22:03

## 2024-08-24 RX ADMIN — Medication 1 APPLICATION(S): at 18:14

## 2024-08-24 NOTE — PROGRESS NOTE ADULT - ASSESSMENT
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   Acute resp failure due to neurologic injury.  AS s/p TAVR, Afib (diagnosed in 2022, refused AC; EF at that time reported as 50%), LBBB, 1st degr AV block; HTN, HLD, CKD.  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  H/o Stage 3 colon cancer in 2009, had surgery; hemorrhoids, h/o LGIB.  Reportedly, with declining mental and physical condition over the last 2 years.  Chano on CKD vs worsening CKD, suspect component of KENYON.    Plan:  - neurochecks  - cont ASA, statin   - SBP goal 100-140; cannot start ACE/ARB/BB in view of renal worsening and episodes of edgar with underlying conduction abnormality; cont Amlodipine  - maintain Osats>92%, vent support, limit SBT to 1-2 hrs; chest PT, Mucomyst/alb nebs; repeat CXR  - monitor e-lytes, I/Os, Thomas in place for strict I/Os  - cont TF, adjusted BM regimen, PPI; cont FW  - maintain -180, ISS  - DVT prophylaxis: SCDs, SQH; home iron supplements  - ongoing GOC discussion with family, full code

## 2024-08-24 NOTE — PROGRESS NOTE ADULT - SUBJECTIVE AND OBJECTIVE BOX
HPI:  89 Yo M with PMH of HTN, colon cancer, enlarged prostate transfer from [facility] from [facility] for altered mental status. LKW 7PM, Per chart, Patient initially complained of  Nausea,  vomiting and dizziness s/p dinner, later  found to be unresponsive and decorticate posturing in [place] ED. 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 Patient   transferred to [facility] for NeuroIR intervention.   l (21 Aug 2024 03:06)    O/ne events: none reported. Still with poor neuro exam.  LLL mucus plugging while on CPAP yesterday; suctioned, re-started on full support.      ICU Vital Signs Last 24 Hrs  T(C): 37.5 (24 Aug 2024 13:30), Max: 38 (23 Aug 2024 18:00)  T(F): 99.5 (24 Aug 2024 13:30), Max: 100.4 (23 Aug 2024 18:00)  HR: 106 (24 Aug 2024 13:30) (53 - 106)  BP: 140/62 (24 Aug 2024 13:30) (111/69 - 145/75)  BP(mean): 88 (24 Aug 2024 13:30) (72 - 105)  ABP: --  ABP(mean): --  RR: 14 (24 Aug 2024 13:30) (9 - 18)  SpO2: 98% (24 Aug 2024 13:30) (95% - 100%)    O2 Parameters below as of 24 Aug 2024 12:00  Patient On (Oxygen Delivery Method): ventilator    O2 Concentration (%): 30          Exam:  no FC, no EO, PERRL 2 mm BL, o/b the vent, + cough, motor - moves L side spont, RUE weak wdrl, RLE wdrl.  S1S2 present.  CTAB, diminished on L basis.  Abd soft, NT, ND  No peripheral swelling

## 2024-08-25 LAB
ANION GAP SERPL CALC-SCNC: 13 MMOL/L — SIGNIFICANT CHANGE UP (ref 5–17)
BUN SERPL-MCNC: 40.8 MG/DL — HIGH (ref 8–20)
CALCIUM SERPL-MCNC: 8.1 MG/DL — LOW (ref 8.4–10.5)
CHLORIDE SERPL-SCNC: 104 MMOL/L — SIGNIFICANT CHANGE UP (ref 96–108)
CO2 SERPL-SCNC: 22 MMOL/L — SIGNIFICANT CHANGE UP (ref 22–29)
CREAT SERPL-MCNC: 1.74 MG/DL — HIGH (ref 0.5–1.3)
EGFR: 37 ML/MIN/1.73M2 — LOW
GLUCOSE SERPL-MCNC: 129 MG/DL — HIGH (ref 70–99)
GRAM STN FLD: ABNORMAL
HCT VFR BLD CALC: 25.6 % — LOW (ref 39–50)
HGB BLD-MCNC: 8.1 G/DL — LOW (ref 13–17)
MAGNESIUM SERPL-MCNC: 2.3 MG/DL — SIGNIFICANT CHANGE UP (ref 1.6–2.6)
MCHC RBC-ENTMCNC: 27.6 PG — SIGNIFICANT CHANGE UP (ref 27–34)
MCHC RBC-ENTMCNC: 31.6 GM/DL — LOW (ref 32–36)
MCV RBC AUTO: 87.4 FL — SIGNIFICANT CHANGE UP (ref 80–100)
PHOSPHATE SERPL-MCNC: 4.2 MG/DL — SIGNIFICANT CHANGE UP (ref 2.4–4.7)
PLATELET # BLD AUTO: 166 K/UL — SIGNIFICANT CHANGE UP (ref 150–400)
POTASSIUM SERPL-MCNC: 4.9 MMOL/L — SIGNIFICANT CHANGE UP (ref 3.5–5.3)
POTASSIUM SERPL-SCNC: 4.9 MMOL/L — SIGNIFICANT CHANGE UP (ref 3.5–5.3)
RAPID RVP RESULT: SIGNIFICANT CHANGE UP
RBC # BLD: 2.93 M/UL — LOW (ref 4.2–5.8)
RBC # FLD: 15.2 % — HIGH (ref 10.3–14.5)
SARS-COV-2 RNA SPEC QL NAA+PROBE: SIGNIFICANT CHANGE UP
SODIUM SERPL-SCNC: 139 MMOL/L — SIGNIFICANT CHANGE UP (ref 135–145)
SPECIMEN SOURCE: SIGNIFICANT CHANGE UP
WBC # BLD: 10.08 K/UL — SIGNIFICANT CHANGE UP (ref 3.8–10.5)
WBC # FLD AUTO: 10.08 K/UL — SIGNIFICANT CHANGE UP (ref 3.8–10.5)

## 2024-08-25 PROCEDURE — 71045 X-RAY EXAM CHEST 1 VIEW: CPT | Mod: 26

## 2024-08-25 PROCEDURE — 99291 CRITICAL CARE FIRST HOUR: CPT | Mod: FS

## 2024-08-25 RX ORDER — IPRATROPIUM BROMIDE AND ALBUTEROL SULFATE .5; 3 MG/3ML; MG/3ML
3 SOLUTION RESPIRATORY (INHALATION) EVERY 6 HOURS
Refills: 0 | Status: DISCONTINUED | OUTPATIENT
Start: 2024-08-25 | End: 2024-09-05

## 2024-08-25 RX ORDER — ACETYLCYSTEINE 200 MG/ML
4 VIAL (ML) MISCELLANEOUS EVERY 6 HOURS
Refills: 0 | Status: DISCONTINUED | OUTPATIENT
Start: 2024-08-25 | End: 2024-09-06

## 2024-08-25 RX ORDER — HYDRALAZINE HCL 50 MG
10 TABLET ORAL
Refills: 0 | Status: DISCONTINUED | OUTPATIENT
Start: 2024-08-25 | End: 2024-09-06

## 2024-08-25 RX ADMIN — Medication 2 TABLET(S): at 21:56

## 2024-08-25 RX ADMIN — Medication 5000 UNIT(S): at 01:58

## 2024-08-25 RX ADMIN — Medication 1 APPLICATION(S): at 17:17

## 2024-08-25 RX ADMIN — AMLODIPINE BESYLATE 5 MILLIGRAM(S): 10 TABLET ORAL at 05:54

## 2024-08-25 RX ADMIN — Medication 10 MILLIGRAM(S): at 20:09

## 2024-08-25 RX ADMIN — TAMSULOSIN HYDROCHLORIDE 0.4 MILLIGRAM(S): 0.4 CAPSULE ORAL at 21:56

## 2024-08-25 RX ADMIN — IPRATROPIUM BROMIDE AND ALBUTEROL SULFATE 3 MILLILITER(S): .5; 3 SOLUTION RESPIRATORY (INHALATION) at 13:14

## 2024-08-25 RX ADMIN — Medication 5000 UNIT(S): at 17:16

## 2024-08-25 RX ADMIN — Medication 40 MILLIGRAM(S): at 17:15

## 2024-08-25 RX ADMIN — Medication 5000 UNIT(S): at 10:04

## 2024-08-25 RX ADMIN — Medication 81 MILLIGRAM(S): at 11:07

## 2024-08-25 RX ADMIN — Medication 1 APPLICATION(S): at 05:53

## 2024-08-25 RX ADMIN — Medication 300 MILLIGRAM(S): at 11:07

## 2024-08-25 RX ADMIN — CHLORHEXIDINE GLUCONATE 15 MILLILITER(S): 40 SOLUTION TOPICAL at 17:16

## 2024-08-25 RX ADMIN — IPRATROPIUM BROMIDE AND ALBUTEROL SULFATE 3 MILLILITER(S): .5; 3 SOLUTION RESPIRATORY (INHALATION) at 21:49

## 2024-08-25 RX ADMIN — CHLORHEXIDINE GLUCONATE 1 APPLICATION(S): 40 SOLUTION TOPICAL at 11:16

## 2024-08-25 RX ADMIN — CHLORHEXIDINE GLUCONATE 15 MILLILITER(S): 40 SOLUTION TOPICAL at 05:53

## 2024-08-25 RX ADMIN — POLYETHYLENE GLYCOL 3350 17 GRAM(S): 17 POWDER, FOR SOLUTION ORAL at 11:08

## 2024-08-25 RX ADMIN — Medication 40 MILLIGRAM(S): at 05:53

## 2024-08-25 RX ADMIN — Medication 20 MILLIGRAM(S): at 11:07

## 2024-08-25 NOTE — PROGRESS NOTE ADULT - ASSESSMENT
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   Acute resp failure due to neurologic injury.  AS s/p TAVR, Afib (diagnosed in 2022, refused AC; EF at that time reported as 50%), LBBB, 1st degr AV block; HTN, HLD, CKD.  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  H/o Stage 3 colon cancer in 2009, had surgery; hemorrhoids, h/o LGIB.  Reportedly, with declining mental and physical condition over the last 2 years.  Chano on CKD vs worsening CKD, suspect component of KENYON.    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   Acute resp failure due to neurologic injury.  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  H/o Stage 3 colon cancer in 2009, had surgery; hemorrhoids, h/o LGIB.  HARINDER on CKD vs worsening CKD, suspect component of KENYON  Fever     Plan:   - neurochecks q4h  - mobility: ROM in bed  - cont ASA, statin   - SBP goal 100-160; cont Amlodipine 5mg daily  - maintain Osats>92%, vent support, limit SBT to 1-2 hrs; chest PT, Mucomyst/alb nebs; repeat CXR  - monitor e-lytes, I/Os, Thomas in place for strict I/Os  - cont TF, BM regimen -> advance, PPI BID; cont FW for HARINDER  - maintain -180, ISS  - monitor WBC and fever curve - holding off on abx, fever work-up in process  - DVT prophylaxis: SCDs, SQH; home iron supplements    Dispo: ongoing GOC discussion with family, full code

## 2024-08-25 NOTE — PROGRESS NOTE ADULT - SUBJECTIVE AND OBJECTIVE BOX
NSICU ATTENDING PROGRESS NOTE    HPI:  89 Yo M with PMH of HTN, colon cancer, enlarged prostate transfer from [facility] from [facility] for altered mental status. LKW 7PM, Per chart, Patient initially complained of  Nausea,  vomiting and dizziness s/p dinner, later  found to be unresponsive and decorticate posturing in [place] ED. 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 Patient   transferred to [facility] for NeuroIR intervention.   l (21 Aug 2024 03:06)    O/ne events: none reported. Still with poor neuro exam.    Exam:  no FC, no EO, PERRL 2 mm BL, o/b the vent, + cough, motor - moves L side spont, RUE weak wdrl, RLE wdrl.  S1S2 present.  CTAB, diminished on L basis.  Abd soft, NT, ND  No peripheral swelling    -----------------------------------------------------------------------------------------------------  ICU Vital Signs Last 24 Hrs  T(C): 37.5 (25 Aug 2024 07:00), Max: 38.3 (24 Aug 2024 19:30)  T(F): 99.5 (25 Aug 2024 07:00), Max: 100.9 (24 Aug 2024 19:30)  HR: 69 (25 Aug 2024 08:25) (48 - 106)  BP: 121/90 (25 Aug 2024 07:00) (98/71 - 145/75)  BP(mean): 98 (25 Aug 2024 07:00) (65 - 106)  ABP: --  ABP(mean): --  RR: 12 (25 Aug 2024 07:00) (10 - 24)  SpO2: 99% (25 Aug 2024 08:25) (97% - 100%)    O2 Parameters below as of 25 Aug 2024 04:00  Patient On (Oxygen Delivery Method): ventilator    O2 Concentration (%): 30        I&O's Summary    24 Aug 2024 07:01  -  25 Aug 2024 07:00  --------------------------------------------------------  IN: 1997.5 mL / OUT: 1160 mL / NET: 837.5 mL        MEDICATIONS  (STANDING):  amLODIPine   Tablet 5 milliGRAM(s) Oral daily  aspirin  chewable 81 milliGRAM(s) Oral daily  atorvastatin 20 milliGRAM(s) Oral daily  chlorhexidine 0.12% Liquid 15 milliLiter(s) Oral Mucosa every 12 hours  chlorhexidine 2% Cloths 1 Application(s) Topical daily  dexMEDEtomidine Infusion 0.2 MICROgram(s)/kG/Hr (3.4 mL/Hr) IV Continuous <Continuous>  ferrous    sulfate Liquid 300 milliGRAM(s) Enteral Tube daily  heparin   Injectable 5000 Unit(s) SubCutaneous every 8 hours  nystatin Powder 1 Application(s) Topical two times a day  pantoprazole  Injectable 40 milliGRAM(s) IV Push every 12 hours  polyethylene glycol 3350 17 Gram(s) Oral daily  senna 2 Tablet(s) Oral at bedtime  tamsulosin 0.4 milliGRAM(s) Oral at bedtime      RESPIRATORY:  Mode: CPAP with PS  FiO2: 30  PEEP: 6  PS: 10  MAP: 10        IMAGING:   Recent imaging studies were reviewed.    LAB RESULTS:                          8.1    10.08 )-----------( 166      ( 25 Aug 2024 03:30 )             25.6     08-25    139  |  104  |  40.8<H>  ----------------------------<  129<H>  4.9   |  22.0  |  1.74<H>    Ca    8.1<L>      25 Aug 2024 03:30  Phos  4.2     08-25  Mg     2.3     08-25    TPro  5.8<L>  /  Alb  2.8<L>  /  TBili  <0.2<L>  /  DBili  x   /  AST  40<H>  /  ALT  36  /  AlkPhos  102  08-24      -----------------------------------------------------------------------------------------------------------------------------------------------------------------------------------           NSICU ATTENDING PROGRESS NOTE    HPI:  89 Yo M with PMH of HTN, colon cancer, enlarged prostate transfer from [facility] from [facility] for altered mental status. LKW 7PM, Per chart, Patient initially complained of  Nausea,  vomiting and dizziness s/p dinner, later  found to be unresponsive and decorticate posturing in [employer] ED. 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 Patient   transferred to [facility] for NeuroIR intervention.   l (21 Aug 2024 03:06)    24h events:  8/24 - febrile overnight -> fever work-up sent    Exam:  +EO, no FC, PERRL 2 mm BL, ?tracking, + cough   motor - moves L side spont, RUE weak wdrl, RLE wdrl.  regular rate and rhythm  normal WOB on PS 10/5  Abd soft, NT, ND, LBM PTA  warm/dry, No peripheral swelling    -----------------------------------------------------------------------------------------------------  ICU Vital Signs Last 24 Hrs  T(C): 37.5 (25 Aug 2024 07:00), Max: 38.3 (24 Aug 2024 19:30)  T(F): 99.5 (25 Aug 2024 07:00), Max: 100.9 (24 Aug 2024 19:30)  HR: 69 (25 Aug 2024 08:25) (48 - 106)  BP: 121/90 (25 Aug 2024 07:00) (98/71 - 145/75)  BP(mean): 98 (25 Aug 2024 07:00) (65 - 106)  RR: 12 (25 Aug 2024 07:00) (10 - 24)  SpO2: 99% (25 Aug 2024 08:25) (97% - 100%)    O2 Parameters below as of 25 Aug 2024 04:00  Patient On (Oxygen Delivery Method): ventilator    O2 Concentration (%): 30        I&O's Summary    24 Aug 2024 07:01  -  25 Aug 2024 07:00  --------------------------------------------------------  IN: 1997.5 mL / OUT: 1160 mL / NET: 837.5 mL        MEDICATIONS  (STANDING):  amLODIPine   Tablet 5 milliGRAM(s) Oral daily  aspirin  chewable 81 milliGRAM(s) Oral daily  atorvastatin 20 milliGRAM(s) Oral daily  chlorhexidine 0.12% Liquid 15 milliLiter(s) Oral Mucosa every 12 hours  chlorhexidine 2% Cloths 1 Application(s) Topical daily  dexMEDEtomidine Infusion 0.2 MICROgram(s)/kG/Hr (3.4 mL/Hr) IV Continuous <Continuous>  ferrous    sulfate Liquid 300 milliGRAM(s) Enteral Tube daily  heparin   Injectable 5000 Unit(s) SubCutaneous every 8 hours  nystatin Powder 1 Application(s) Topical two times a day  pantoprazole  Injectable 40 milliGRAM(s) IV Push every 12 hours  polyethylene glycol 3350 17 Gram(s) Oral daily  senna 2 Tablet(s) Oral at bedtime  tamsulosin 0.4 milliGRAM(s) Oral at bedtime      RESPIRATORY:  Mode: CPAP with PS  FiO2: 30  PEEP: 6  PS: 10  MAP: 10    IMAGING:   CXR - RLL opacity    LAB RESULTS:               8.1    10.08 )-----------( 166      ( 25 Aug 2024 03:30 )             25.6     08-25    139  |  104  |  40.8<H>  ----------------------------<  129<H>  4.9   |  22.0  |  1.74<H>    Ca    8.1<L>      25 Aug 2024 03:30  Phos  4.2     08-25  Mg     2.3     08-25    TPro  5.8<L>  /  Alb  2.8<L>  /  TBili  <0.2<L>  /  DBili  x   /  AST  40<H>  /  ALT  36  /  AlkPhos  102  08-24      -----------------------------------------------------------------------------------------------------------------------------------------------------------------------------------

## 2024-08-26 LAB
ANION GAP SERPL CALC-SCNC: 13 MMOL/L — SIGNIFICANT CHANGE UP (ref 5–17)
BUN SERPL-MCNC: 38.7 MG/DL — HIGH (ref 8–20)
CALCIUM SERPL-MCNC: 7.8 MG/DL — LOW (ref 8.4–10.5)
CHLORIDE SERPL-SCNC: 102 MMOL/L — SIGNIFICANT CHANGE UP (ref 96–108)
CO2 SERPL-SCNC: 22 MMOL/L — SIGNIFICANT CHANGE UP (ref 22–29)
CREAT SERPL-MCNC: 1.44 MG/DL — HIGH (ref 0.5–1.3)
EGFR: 46 ML/MIN/1.73M2 — LOW
GLUCOSE SERPL-MCNC: 140 MG/DL — HIGH (ref 70–99)
HCT VFR BLD CALC: 24.2 % — LOW (ref 39–50)
HGB BLD-MCNC: 7.7 G/DL — LOW (ref 13–17)
MAGNESIUM SERPL-MCNC: 2.3 MG/DL — SIGNIFICANT CHANGE UP (ref 1.6–2.6)
MCHC RBC-ENTMCNC: 27.1 PG — SIGNIFICANT CHANGE UP (ref 27–34)
MCHC RBC-ENTMCNC: 31.8 GM/DL — LOW (ref 32–36)
MCV RBC AUTO: 85.2 FL — SIGNIFICANT CHANGE UP (ref 80–100)
PHOSPHATE SERPL-MCNC: 3.4 MG/DL — SIGNIFICANT CHANGE UP (ref 2.4–4.7)
PLATELET # BLD AUTO: 180 K/UL — SIGNIFICANT CHANGE UP (ref 150–400)
POTASSIUM SERPL-MCNC: 4.4 MMOL/L — SIGNIFICANT CHANGE UP (ref 3.5–5.3)
POTASSIUM SERPL-SCNC: 4.4 MMOL/L — SIGNIFICANT CHANGE UP (ref 3.5–5.3)
RBC # BLD: 2.84 M/UL — LOW (ref 4.2–5.8)
RBC # FLD: 15 % — HIGH (ref 10.3–14.5)
SODIUM SERPL-SCNC: 137 MMOL/L — SIGNIFICANT CHANGE UP (ref 135–145)
WBC # BLD: 8.97 K/UL — SIGNIFICANT CHANGE UP (ref 3.8–10.5)
WBC # FLD AUTO: 8.97 K/UL — SIGNIFICANT CHANGE UP (ref 3.8–10.5)

## 2024-08-26 PROCEDURE — 71045 X-RAY EXAM CHEST 1 VIEW: CPT | Mod: 26

## 2024-08-26 PROCEDURE — 99291 CRITICAL CARE FIRST HOUR: CPT | Mod: FS

## 2024-08-26 RX ORDER — ACETAMINOPHEN 325 MG/1
1000 TABLET ORAL ONCE
Refills: 0 | Status: COMPLETED | OUTPATIENT
Start: 2024-08-26 | End: 2024-08-26

## 2024-08-26 RX ADMIN — Medication 1 APPLICATION(S): at 06:24

## 2024-08-26 RX ADMIN — POLYETHYLENE GLYCOL 3350 17 GRAM(S): 17 POWDER, FOR SOLUTION ORAL at 10:19

## 2024-08-26 RX ADMIN — TAMSULOSIN HYDROCHLORIDE 0.4 MILLIGRAM(S): 0.4 CAPSULE ORAL at 21:05

## 2024-08-26 RX ADMIN — Medication 300 MILLIGRAM(S): at 10:18

## 2024-08-26 RX ADMIN — IPRATROPIUM BROMIDE AND ALBUTEROL SULFATE 3 MILLILITER(S): .5; 3 SOLUTION RESPIRATORY (INHALATION) at 14:14

## 2024-08-26 RX ADMIN — Medication 40 MILLIGRAM(S): at 06:24

## 2024-08-26 RX ADMIN — CHLORHEXIDINE GLUCONATE 1 APPLICATION(S): 40 SOLUTION TOPICAL at 10:19

## 2024-08-26 RX ADMIN — AMLODIPINE BESYLATE 5 MILLIGRAM(S): 10 TABLET ORAL at 06:24

## 2024-08-26 RX ADMIN — Medication 1 APPLICATION(S): at 17:27

## 2024-08-26 RX ADMIN — Medication 40 MILLIGRAM(S): at 17:27

## 2024-08-26 RX ADMIN — CHLORHEXIDINE GLUCONATE 15 MILLILITER(S): 40 SOLUTION TOPICAL at 06:24

## 2024-08-26 RX ADMIN — Medication 10 MILLIGRAM(S): at 17:29

## 2024-08-26 RX ADMIN — ACETAMINOPHEN 1000 MILLIGRAM(S): 325 TABLET ORAL at 22:29

## 2024-08-26 RX ADMIN — Medication 5000 UNIT(S): at 01:38

## 2024-08-26 RX ADMIN — Medication 2 TABLET(S): at 21:05

## 2024-08-26 RX ADMIN — IPRATROPIUM BROMIDE AND ALBUTEROL SULFATE 3 MILLILITER(S): .5; 3 SOLUTION RESPIRATORY (INHALATION) at 04:32

## 2024-08-26 RX ADMIN — Medication 20 MILLIGRAM(S): at 10:19

## 2024-08-26 RX ADMIN — IPRATROPIUM BROMIDE AND ALBUTEROL SULFATE 3 MILLILITER(S): .5; 3 SOLUTION RESPIRATORY (INHALATION) at 21:34

## 2024-08-26 RX ADMIN — ACETAMINOPHEN 1000 MILLIGRAM(S): 325 TABLET ORAL at 12:44

## 2024-08-26 RX ADMIN — CHLORHEXIDINE GLUCONATE 15 MILLILITER(S): 40 SOLUTION TOPICAL at 17:27

## 2024-08-26 RX ADMIN — ACETAMINOPHEN 400 MILLIGRAM(S): 325 TABLET ORAL at 21:59

## 2024-08-26 RX ADMIN — ACETAMINOPHEN 400 MILLIGRAM(S): 325 TABLET ORAL at 12:33

## 2024-08-26 RX ADMIN — Medication 30 MILLILITER(S): at 12:33

## 2024-08-26 RX ADMIN — IPRATROPIUM BROMIDE AND ALBUTEROL SULFATE 3 MILLILITER(S): .5; 3 SOLUTION RESPIRATORY (INHALATION) at 08:21

## 2024-08-26 NOTE — PROGRESS NOTE ADULT - ASSESSMENT
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   Acute resp failure due to neurologic injury.  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  H/o Stage 3 colon cancer in 2009, had surgery; hemorrhoids, h/o LGIB.  HARINDER on CKD vs worsening CKD, suspect component of KENYON  Fever     Plan: ***  - neurochecks q4h  - mobility: ROM in bed  - cont ASA, statin   - SBP goal 100-160; cont Amlodipine 5mg daily  - maintain Osats>92%, vent support, limit SBT to 1-2 hrs; chest PT, Mucomyst/alb nebs; repeat CXR  - monitor e-lytes, I/Os, Thomas in place for strict I/Os  - cont TF, BM regimen -> advance, PPI BID; cont FW for HARINDER  - maintain -180, ISS  - monitor WBC and fever curve - holding off on abx, fever work-up in process  - DVT prophylaxis: SCDs, SQH; home iron supplements    Dispo: ongoing GOC discussion with family, full code       89M HTN, HLD, 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   Acute resp failure due to neurologic injury.  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  H/o Stage 3 colon cancer in 2009, had surgery; hemorrhoids, h/o LGIB.  HARINDER on CKD vs worsening CKD, suspect component of KENYON  Fever     Plan:   - neurochecks q4h  - pain control: tylenol PRN, minimizing sedation  - mobility: ROM in bed   - SBP goal 100-160; cont Amlodipine 5mg daily, statin  - maintain Osats>92%, vent support, limit SBT to 1-2 hrs  - chest PT, Mucomyst/alb nebs; repeat CXR  - monitor e-lytes, I/Os, Thomas while awaiting BM, flomax  - cont TF, BM regimen -> advance, PPI BID; cont FW for HARINDER  - maintain -180, ISS  - monitor WBC and fever curve - holding off on abx, fever work-up in process  - DVT prophylaxis: SCDs; home iron supplements  - holding ASA and chemoppx while having bloody secretions    Dispo: ongoing GOC discussion with family, full code

## 2024-08-26 NOTE — PROGRESS NOTE ADULT - CRITICAL CARE ATTENDING COMMENT
Pt is critically ill and at high risk of rapid deterioration/death due to abovementioned conditions.   Still requires critical care interventions - ongoing frequent evaluations, interventions and management adjustment by the Attending and ICU team, - and included review of relevant history, clinical examination, review of data and images, discussion of treatment with the multidisciplinary team and any consultants involved in this patient’s care as well as family discussion. Pt is critically ill and at high risk of rapid deterioration/death due to abovementioned conditions.   Still requires critical care interventions - ongoing frequent evaluations, interventions and management adjustment by the Attending and ICU team - and included review of relevant history, clinical examination, review of data and images, updating family, discussion of treatment with the multidisciplinary team and any consultants involved in this patient’s care as well as family discussion.

## 2024-08-26 NOTE — PROGRESS NOTE ADULT - SUBJECTIVE AND OBJECTIVE BOX
NSICU ATTENDING PROGRESS NOTE    HPI:  89 Yo M with PMH of HTN, colon cancer, enlarged prostate transfer from [facility] from [facility] for altered mental status. LKW 7PM, Per chart, Patient initially complained of  Nausea,  vomiting and dizziness s/p dinner, later  found to be unresponsive and decorticate posturing in [place] ED. 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 Patient   transferred to [facility] for NeuroIR intervention.   l (21 Aug 2024 03:06)    24h events:  8/24 - febrile overnight -> fever work-up sent    Exam:  +EO, no FC, PERRL 2 mm BL, ?tracking, + cough   motor - moves L side spont, RUE weak wdrl, RLE wdrl.  regular rate and rhythm  normal WOB on PS 10/5, +bloody secretions  Abd soft, NT, ND, LBM PTA  warm/dry, No peripheral swelling    -----------------------------------------------------------------------------------------------------  ICU Vital Signs Last 24 Hrs  T(C): 37.4 (26 Aug 2024 08:00), Max: 38.1 (25 Aug 2024 23:00)  T(F): 99.3 (26 Aug 2024 08:00), Max: 100.6 (25 Aug 2024 23:00)  HR: 93 (26 Aug 2024 08:22) (72 - 110)  BP: 147/66 (26 Aug 2024 08:00) (112/88 - 147/73)  BP(mean): 88 (26 Aug 2024 08:00) (76 - 103)  ABP: --  ABP(mean): --  RR: 14 (26 Aug 2024 08:00) (8 - 26)  SpO2: 100% (26 Aug 2024 08:22) (98% - 100%)    O2 Parameters below as of 26 Aug 2024 08:22  Patient On (Oxygen Delivery Method): ventilator            I&O's Summary    25 Aug 2024 07:01  -  26 Aug 2024 07:00  --------------------------------------------------------  IN: 1880 mL / OUT: 1225 mL / NET: 655 mL    26 Aug 2024 07:01  -  26 Aug 2024 09:00  --------------------------------------------------------  IN: 90 mL / OUT: 200 mL / NET: -110 mL        MEDICATIONS  (STANDING):  albuterol/ipratropium for Nebulization 3 milliLiter(s) Nebulizer every 6 hours  amLODIPine   Tablet 5 milliGRAM(s) Oral daily  aspirin  chewable 81 milliGRAM(s) Oral daily  atorvastatin 20 milliGRAM(s) Oral daily  chlorhexidine 0.12% Liquid 15 milliLiter(s) Oral Mucosa every 12 hours  chlorhexidine 2% Cloths 1 Application(s) Topical daily  ferrous    sulfate Liquid 300 milliGRAM(s) Enteral Tube daily  heparin   Injectable 5000 Unit(s) SubCutaneous every 8 hours  nystatin Powder 1 Application(s) Topical two times a day  pantoprazole  Injectable 40 milliGRAM(s) IV Push every 12 hours  polyethylene glycol 3350 17 Gram(s) Oral daily  senna 2 Tablet(s) Oral at bedtime  tamsulosin 0.4 milliGRAM(s) Oral at bedtime      RESPIRATORY:  Mode: AC/ CMV (Assist Control/ Continuous Mandatory Ventilation)  RR (machine): 10  TV (machine): 500  FiO2: 30  PEEP: 6  MAP: 10  PIP: 17        IMAGING:   Recent imaging studies were reviewed.    LAB RESULTS:                          7.7    8.97  )-----------( 180      ( 26 Aug 2024 02:40 )             24.2           08-26    137  |  102  |  38.7<H>  ----------------------------<  140<H>  4.4   |  22.0  |  1.44<H>    Ca    7.8<L>      26 Aug 2024 02:40  Phos  3.4     08-26  Mg     2.3     08-26            ABG - ( 24 Aug 2024 16:20 )  pH, Arterial: 7.470 pH, Blood: x     /  pCO2: 35    /  pO2: 118   / HCO3: 26    / Base Excess: 1.8   /  SaO2: 99.8                  -----------------------------------------------------------------------------------------------------------------------------------------------------------------------------------           NSICU ATTENDING PROGRESS NOTE    HPI:  89 Yo M with PMH of HTN, colon cancer, enlarged prostate transfer from [facility] from [facility] for altered mental status. LKW 7PM, Per chart, Patient initially complained of  Nausea,  vomiting and dizziness s/p dinner, later  found to be unresponsive and decorticate posturing in [place] ED. 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 Patient   transferred to [facility] for NeuroIR intervention.   l (21 Aug 2024 03:06)    24h events:  8/24 - febrile overnight -> fever work-up sent  8/25 - following commands, developed bloody in-line secretions; family discussions - prefer to minimize sedation    Exam:  +EO, no FC, PERRL 2 mm BL, ?tracking, + cough  showed 2 fingers/thumbs up on LUE  motor - moves L side spont, RUE weak wdrl, RLE wdrl.  regular rate and rhythm  normal WOB on PS 10/5, +bloody secretions in-line and oral  Abd soft, NT, ND, LBM PTA  warm/dry, No peripheral swelling    -----------------------------------------------------------------------------------------------------  ICU Vital Signs Last 24 Hrs  T(C): 37.4 (26 Aug 2024 08:00), Max: 38.1 (25 Aug 2024 23:00)  T(F): 99.3 (26 Aug 2024 08:00), Max: 100.6 (25 Aug 2024 23:00)  HR: 93 (26 Aug 2024 08:22) (72 - 110)  BP: 147/66 (26 Aug 2024 08:00) (112/88 - 147/73)  BP(mean): 88 (26 Aug 2024 08:00) (76 - 103)  RR: 14 (26 Aug 2024 08:00) (8 - 26)  SpO2: 100% (26 Aug 2024 08:22) (98% - 100%)    O2 Parameters below as of 26 Aug 2024 08:22  Patient On (Oxygen Delivery Method): ventilator            I&O's Summary    25 Aug 2024 07:01  -  26 Aug 2024 07:00  --------------------------------------------------------  IN: 1880 mL / OUT: 1225 mL / NET: 655 mL    26 Aug 2024 07:01  -  26 Aug 2024 09:00  --------------------------------------------------------  IN: 90 mL / OUT: 200 mL / NET: -110 mL        MEDICATIONS  (STANDING):  albuterol/ipratropium for Nebulization 3 milliLiter(s) Nebulizer every 6 hours  amLODIPine   Tablet 5 milliGRAM(s) Oral daily  aspirin  chewable 81 milliGRAM(s) Oral daily  atorvastatin 20 milliGRAM(s) Oral daily  chlorhexidine 0.12% Liquid 15 milliLiter(s) Oral Mucosa every 12 hours  chlorhexidine 2% Cloths 1 Application(s) Topical daily  ferrous    sulfate Liquid 300 milliGRAM(s) Enteral Tube daily  heparin   Injectable 5000 Unit(s) SubCutaneous every 8 hours  nystatin Powder 1 Application(s) Topical two times a day  pantoprazole  Injectable 40 milliGRAM(s) IV Push every 12 hours  polyethylene glycol 3350 17 Gram(s) Oral daily  senna 2 Tablet(s) Oral at bedtime  tamsulosin 0.4 milliGRAM(s) Oral at bedtime      RESPIRATORY:  Mode: AC/ CMV (Assist Control/ Continuous Mandatory Ventilation)  RR (machine): 10  TV (machine): 500  FiO2: 30  PEEP: 6    IMAGING:   CXR - stable from prior    LAB RESULTS:             7.7    8.97  )-----------( 180      ( 26 Aug 2024 02:40 )             24.2     08-26    137  |  102  |  38.7<H>  ----------------------------<  140<H>  4.4   |  22.0  |  1.44<H>    Ca    7.8<L>      26 Aug 2024 02:40  Phos  3.4     08-26  Mg     2.3     08-26        -----------------------------------------------------------------------------------------------------------------------------------------------------------------------------------

## 2024-08-27 DIAGNOSIS — R53.81 OTHER MALAISE: ICD-10-CM

## 2024-08-27 DIAGNOSIS — J96.01 ACUTE RESPIRATORY FAILURE WITH HYPOXIA: ICD-10-CM

## 2024-08-27 DIAGNOSIS — Z51.5 ENCOUNTER FOR PALLIATIVE CARE: ICD-10-CM

## 2024-08-27 LAB
-  AMPICILLIN/SULBACTAM: SIGNIFICANT CHANGE UP
-  AMPICILLIN: SIGNIFICANT CHANGE UP
-  AZTREONAM: SIGNIFICANT CHANGE UP
-  CEFAZOLIN: SIGNIFICANT CHANGE UP
-  CEFEPIME: SIGNIFICANT CHANGE UP
-  CEFTRIAXONE: SIGNIFICANT CHANGE UP
-  CIPROFLOXACIN: SIGNIFICANT CHANGE UP
-  ERTAPENEM: SIGNIFICANT CHANGE UP
-  GENTAMICIN: SIGNIFICANT CHANGE UP
-  LEVOFLOXACIN: SIGNIFICANT CHANGE UP
-  MEROPENEM: SIGNIFICANT CHANGE UP
-  PIPERACILLIN/TAZOBACTAM: SIGNIFICANT CHANGE UP
-  TOBRAMYCIN: SIGNIFICANT CHANGE UP
-  TRIMETHOPRIM/SULFAMETHOXAZOLE: SIGNIFICANT CHANGE UP
ANION GAP SERPL CALC-SCNC: 10 MMOL/L — SIGNIFICANT CHANGE UP (ref 5–17)
BUN SERPL-MCNC: 34.2 MG/DL — HIGH (ref 8–20)
CALCIUM SERPL-MCNC: 8.7 MG/DL — SIGNIFICANT CHANGE UP (ref 8.4–10.5)
CHLORIDE SERPL-SCNC: 104 MMOL/L — SIGNIFICANT CHANGE UP (ref 96–108)
CO2 SERPL-SCNC: 26 MMOL/L — SIGNIFICANT CHANGE UP (ref 22–29)
CREAT SERPL-MCNC: 1.49 MG/DL — HIGH (ref 0.5–1.3)
CULTURE RESULTS: ABNORMAL
EGFR: 44 ML/MIN/1.73M2 — LOW
GLUCOSE SERPL-MCNC: 109 MG/DL — HIGH (ref 70–99)
HCT VFR BLD CALC: 27.3 % — LOW (ref 39–50)
HGB BLD-MCNC: 8.5 G/DL — LOW (ref 13–17)
MAGNESIUM SERPL-MCNC: 2.4 MG/DL — SIGNIFICANT CHANGE UP (ref 1.6–2.6)
MCHC RBC-ENTMCNC: 27 PG — SIGNIFICANT CHANGE UP (ref 27–34)
MCHC RBC-ENTMCNC: 31.1 GM/DL — LOW (ref 32–36)
MCV RBC AUTO: 86.7 FL — SIGNIFICANT CHANGE UP (ref 80–100)
METHOD TYPE: SIGNIFICANT CHANGE UP
ORGANISM # SPEC MICROSCOPIC CNT: ABNORMAL
ORGANISM # SPEC MICROSCOPIC CNT: SIGNIFICANT CHANGE UP
PHOSPHATE SERPL-MCNC: 4.1 MG/DL — SIGNIFICANT CHANGE UP (ref 2.4–4.7)
PLATELET # BLD AUTO: 150 K/UL — SIGNIFICANT CHANGE UP (ref 150–400)
POTASSIUM SERPL-MCNC: 4.7 MMOL/L — SIGNIFICANT CHANGE UP (ref 3.5–5.3)
POTASSIUM SERPL-SCNC: 4.7 MMOL/L — SIGNIFICANT CHANGE UP (ref 3.5–5.3)
PROCALCITONIN SERPL-MCNC: 0.14 NG/ML — HIGH (ref 0.02–0.1)
RBC # BLD: 3.15 M/UL — LOW (ref 4.2–5.8)
RBC # FLD: 14.8 % — HIGH (ref 10.3–14.5)
SODIUM SERPL-SCNC: 139 MMOL/L — SIGNIFICANT CHANGE UP (ref 135–145)
SPECIMEN SOURCE: SIGNIFICANT CHANGE UP
WBC # BLD: 8.2 K/UL — SIGNIFICANT CHANGE UP (ref 3.8–10.5)
WBC # FLD AUTO: 8.2 K/UL — SIGNIFICANT CHANGE UP (ref 3.8–10.5)

## 2024-08-27 PROCEDURE — 99291 CRITICAL CARE FIRST HOUR: CPT | Mod: FS

## 2024-08-27 PROCEDURE — 99223 1ST HOSP IP/OBS HIGH 75: CPT

## 2024-08-27 PROCEDURE — 99498 ADVNCD CARE PLAN ADDL 30 MIN: CPT | Mod: 25

## 2024-08-27 PROCEDURE — 70450 CT HEAD/BRAIN W/O DYE: CPT | Mod: 26

## 2024-08-27 PROCEDURE — 99497 ADVNCD CARE PLAN 30 MIN: CPT | Mod: 25

## 2024-08-27 RX ORDER — SODIUM CHLORIDE 9 MG/ML
4 INJECTION INTRAMUSCULAR; INTRAVENOUS; SUBCUTANEOUS EVERY 12 HOURS
Refills: 0 | Status: COMPLETED | OUTPATIENT
Start: 2024-08-27 | End: 2024-08-29

## 2024-08-27 RX ORDER — ERTAPENEM SODIUM 1 G/1
500 INJECTION, POWDER, LYOPHILIZED, FOR SOLUTION INTRAMUSCULAR; INTRAVENOUS EVERY 24 HOURS
Refills: 0 | Status: DISCONTINUED | OUTPATIENT
Start: 2024-08-27 | End: 2024-08-28

## 2024-08-27 RX ADMIN — POLYETHYLENE GLYCOL 3350 17 GRAM(S): 17 POWDER, FOR SOLUTION ORAL at 11:12

## 2024-08-27 RX ADMIN — IPRATROPIUM BROMIDE AND ALBUTEROL SULFATE 3 MILLILITER(S): .5; 3 SOLUTION RESPIRATORY (INHALATION) at 08:27

## 2024-08-27 RX ADMIN — Medication 40 MILLIGRAM(S): at 05:32

## 2024-08-27 RX ADMIN — Medication 1 APPLICATION(S): at 05:32

## 2024-08-27 RX ADMIN — TAMSULOSIN HYDROCHLORIDE 0.4 MILLIGRAM(S): 0.4 CAPSULE ORAL at 22:19

## 2024-08-27 RX ADMIN — IPRATROPIUM BROMIDE AND ALBUTEROL SULFATE 3 MILLILITER(S): .5; 3 SOLUTION RESPIRATORY (INHALATION) at 03:56

## 2024-08-27 RX ADMIN — Medication 2 TABLET(S): at 22:20

## 2024-08-27 RX ADMIN — CHLORHEXIDINE GLUCONATE 15 MILLILITER(S): 40 SOLUTION TOPICAL at 05:31

## 2024-08-27 RX ADMIN — Medication 10 MILLIGRAM(S): at 11:11

## 2024-08-27 RX ADMIN — Medication 20 MILLIGRAM(S): at 11:11

## 2024-08-27 RX ADMIN — Medication 300 MILLIGRAM(S): at 11:11

## 2024-08-27 RX ADMIN — IPRATROPIUM BROMIDE AND ALBUTEROL SULFATE 3 MILLILITER(S): .5; 3 SOLUTION RESPIRATORY (INHALATION) at 21:24

## 2024-08-27 RX ADMIN — Medication 1 APPLICATION(S): at 17:15

## 2024-08-27 RX ADMIN — Medication 30 MILLILITER(S): at 11:10

## 2024-08-27 RX ADMIN — CHLORHEXIDINE GLUCONATE 15 MILLILITER(S): 40 SOLUTION TOPICAL at 17:16

## 2024-08-27 RX ADMIN — IPRATROPIUM BROMIDE AND ALBUTEROL SULFATE 3 MILLILITER(S): .5; 3 SOLUTION RESPIRATORY (INHALATION) at 14:50

## 2024-08-27 RX ADMIN — CHLORHEXIDINE GLUCONATE 1 APPLICATION(S): 40 SOLUTION TOPICAL at 11:11

## 2024-08-27 RX ADMIN — ERTAPENEM SODIUM 100 MILLIGRAM(S): 1 INJECTION, POWDER, LYOPHILIZED, FOR SOLUTION INTRAMUSCULAR; INTRAVENOUS at 17:19

## 2024-08-27 RX ADMIN — AMLODIPINE BESYLATE 5 MILLIGRAM(S): 10 TABLET ORAL at 05:31

## 2024-08-27 RX ADMIN — Medication 40 MILLIGRAM(S): at 17:14

## 2024-08-27 NOTE — CONSULT NOTE ADULT - SUBJECTIVE AND OBJECTIVE BOX
HPI from chart -  Unable  to obtain secondary to intubation  HPI:  89 Yo M with PMH of HTN, colon cancer, enlarged prostate transfer from [facility] from [facility] for altered mental status. LKW 7PM, Per chart, Patient initially complained of  Nausea,  vomiting and dizziness s/p dinner, later  found to be unresponsive and decorticate posturing in [place] ED. 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 Patient   transferred to [facility] for NeuroIR intervention.     PAST MEDICAL & SURGICAL HISTORY:  Colonic cancer  HTN    HTN (hypertension)    SOCIAL HISTORY:                      Substance history:                    Admitted from:  [facility]                    Uatsdin/spirituality:                      Cultural concerns:    Baseline ADLs (prior to admission):  Independent/ Dependent                        Surrogate/HCP/Guardian:  Ulices Lovell    FAMILY HISTORY: poor historian      Allergies    Allergy Status Unknown    Intolerances        http://npcrc.org/files/[email or web address]    Present Symptoms:   Dyspnea:  No on vent  Nausea/Vomiting:  No  Yes  Anxiety/ Agitation    No  Yes  Unable to assess  Fatigue:  Unable to assess   Loss of appetite:   N/A  NPO   Constipation:  Not reported   Yes    Pain:  No  No signs            Location            Character            Duration            Factors            Severity            Effect    Pain AD Score:  [email or web address] (press ctrl + left click to view)    Review of Systems: Reviewed    Unableto obtain due to poor    historian      MEDICATIONS  (STANDING):  albuterol/ipratropium for Nebulization 3 milliLiter(s) Nebulizer every 6 hours  amLODIPine   Tablet 5 milliGRAM(s) Oral daily  atorvastatin 20 milliGRAM(s) Oral daily  bisacodyl Suppository 10 milliGRAM(s) Rectal daily  chlorhexidine 0.12% Liquid 15 milliLiter(s) Oral Mucosa every 12 hours  chlorhexidine 2% Cloths 1 Application(s) Topical daily  ferrous    sulfate Liquid 300 milliGRAM(s) Enteral Tube daily  magnesium hydroxide Suspension 30 milliLiter(s) Oral daily  nystatin Powder 1 Application(s) Topical two times a day  pantoprazole  Injectable 40 milliGRAM(s) IV Push every 12 hours  polyethylene glycol 3350 17 Gram(s) Oral daily  senna 2 Tablet(s) Oral at bedtime  tamsulosin 0.4 milliGRAM(s) Oral at bedtime    MEDICATIONS  (PRN):  acetylcysteine 20%  Inhalation 4 milliLiter(s) Inhalation every 6 hours PRN Thick secretions  hydrALAZINE Injectable 10 milliGRAM(s) IV Push every 2 hours PRN SBP>160      PHYSICAL EXAM:    Vital Signs Last 24 Hrs  T(C): 37 (27 Aug 2024 07:00), Max: 37.2 (26 Aug 2024 13:00)  T(F): 98.6 (27 Aug 2024 07:00), Max: 99 (26 Aug 2024 13:00)  HR: 65 (27 Aug 2024 08:50) (65 - 91)  BP: 125/60 (27 Aug 2024 07:00) (113/55 - 151/68)  BP(mean): 81 (27 Aug 2024 07:00) (60 - 93)  RR: 13 (27 Aug 2024 07:00) (10 - 26)  SpO2: 99% (27 Aug 2024 08:50) (98% - 100%)    Parameters below as of 27 Aug 2024 08:27  Patient On (Oxygen Delivery Method): ventilator        Karnofsky     %  General:    HEENT: NCAT      (  )  ET tube   (    ) NGT  Lungs: comfortable  CV:   GI:           (  )  PEG  MSK: normal   weak  chair/bedbound  Neuro:   Skin: warm dry  Psych:    LABS:                        8.5    8.20  )-----------( 150      ( 27 Aug 2024 03:00 )             27.3     08-27    139  |  104  |  34.2<H>  ----------------------------<  109<H>  4.7   |  26.0  |  1.49<H>    Ca    8.7      27 Aug 2024 03:00  Phos  4.1     08-27  Mg     2.4     08-27        Urinalysis Basic - ( 27 Aug 2024 03:00 )    Color: x / Appearance: x / SG: x / pH: x  Gluc: 109 mg/dL / Ketone: x  / Bili: x / Urobili: x   Blood: x / Protein: x / Nitrite: x   Leuk Esterase: x / RBC: x / WBC x   Sq Epi: x / Non Sq Epi: x / Bacteria: x      I&O's Summary    26 Aug 2024 07:01  -  27 Aug 2024 07:00  --------------------------------------------------------  IN: 1305 mL / OUT: 1835 mL / NET: -530 mL        RADIOLOGY & ADDITIONAL STUDIES:  Imaging reviewed   < from: CT Head No Cont (08.21.24 @ 08:09) >  ACC: [id number] EXAM:  CT BRAIN   ORDERED BY: JARROD MUÑOZ     PROCEDURE DATE:  08/21/2024          INTERPRETATION:  CLINICAL INFORMATION: f/u brainstem stroke s/p   thrombectomy    COMPARISON: CT/CTA of the head August 20, 2024    CONTRAST:  IV Contrast: None  Complications: None    TECHNIQUE:  Serial axial images were obtained from the skull base to the   vertex using multi-slice helical technique. Sagittal and coronal   reformats were obtained.    FINDINGS:    VENTRICLES AND SULCI: Age appropriate involutional changes.  INTRA-AXIAL: Increased attenuation with the mikaela, new when compared to   prior imaging, with small prominent focus in the anterior left hemipons.   Areas of decreased attenuation throughout the deep and periventricular   white matter, compatible with chronic small vessel disease.  EXTRA-AXIAL: No mass or fluid collection. Basal cisterns are normal in   appearance.    VISUALIZED SINUSES:  Scattered mucosal thickening.  TYMPANOMASTOID CAVITIES:  Small right mastoid effusion.  VISUALIZED ORBITS: Bilateral lens replacement.  CALVARIUM: Intact.    MISCELLANEOUS: None.      IMPRESSION:  Increased attenuation within the mikaela, new compared to prior imaging.   Finding likely compatible with a developing hemorrhagic conversion of   patient's known infarct and/or contrast staining. Recommend close   interval imaging for further evaluation.        --- End of Report ---            ALAN VAN MD; Attending Radiologist  This document has been electronically signed. Aug 21 2024  8:17AM    < end of copied text >    < from: TTE W or WO Ultrasound Enhancing Agent (08.21.24 @ 12:49) >      1. Left ventricular cavity is normal in size. Left ventricular systolic function is moderately decreased with an ejection fraction of 37 % by Kern's method of disks.   2. Multiple segmental abnormalitiesexist. See findings.   3. The left ventricular diastolic function is indeterminate.   4. Normal right ventricular cavity size and normal right ventricular systolic function.   5. Left atrium is mildly dilated.   6. The right atrium is normal in size.   7. Moderate mitral regurgitation.   8. Structurally normal mitral valve with normal leaflet excursion.   9. Mild tricuspid regurgitation.  10. Estimated pulmonary artery systolic pressure is 34 mmHg.  11. Lipomatous interatrial septal hypertrophy present.  12. Mild left ventricular hypertrophy.  13. There is mild calcification of the mitral valve annulus.  14. There is no evidence of a left ventricular thrombus.    < end of copied text >        ADVANCE DIRECTIVES/TREATMENT PREFERENCES:  Currently Full code   HPI from chart -  Unable  to obtain secondary to intubation  HPI:  89 Yo M with PMH of HTN, colon cancer, enlarged prostate transfer from [facility] from [facility] for altered mental status. LKW 7PM, Per chart, Patient initially complained of  Nausea,  vomiting and dizziness s/p dinner, later  found to be unresponsive and decorticate posturing in [place] ED. 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 Patient   transferred to [facility] for NeuroIR intervention.     PAST MEDICAL & SURGICAL HISTORY:  Colonic cancer  HTN    HTN (hypertension)    SOCIAL HISTORY:                      Substance history:                    Admitted from:  [facility]                    Mosque/spirituality:                      Cultural concerns:    Baseline ADLs (prior to admission):  Independent/ Dependent                        Surrogate/HCP/Guardian:  Ulices Lovell    FAMILY HISTORY: poor historian      Allergies    Allergy Status Unknown    Intolerances        http://npcrc.org/files/[email or web address]    Present Symptoms:   Dyspnea:  No on vent  Nausea/Vomiting:  No    Anxiety/ Agitation    No   Fatigue:  Unable to assess   Loss of appetite:   N/A  NPO   Constipation:  Not reported   Yes    Pain:    No signs            Location            Character            Duration            Factors            Severity            Effect    Pain AD Score:  [email or web address] (press ctrl + left click to view)    Review of Systems: Reviewed    Unableto obtain due to poor historian - intubated      MEDICATIONS  (STANDING):  albuterol/ipratropium for Nebulization 3 milliLiter(s) Nebulizer every 6 hours  amLODIPine   Tablet 5 milliGRAM(s) Oral daily  atorvastatin 20 milliGRAM(s) Oral daily  bisacodyl Suppository 10 milliGRAM(s) Rectal daily  chlorhexidine 0.12% Liquid 15 milliLiter(s) Oral Mucosa every 12 hours  chlorhexidine 2% Cloths 1 Application(s) Topical daily  ferrous    sulfate Liquid 300 milliGRAM(s) Enteral Tube daily  magnesium hydroxide Suspension 30 milliLiter(s) Oral daily  nystatin Powder 1 Application(s) Topical two times a day  pantoprazole  Injectable 40 milliGRAM(s) IV Push every 12 hours  polyethylene glycol 3350 17 Gram(s) Oral daily  senna 2 Tablet(s) Oral at bedtime  tamsulosin 0.4 milliGRAM(s) Oral at bedtime    MEDICATIONS  (PRN):  acetylcysteine 20%  Inhalation 4 milliLiter(s) Inhalation every 6 hours PRN Thick secretions  hydrALAZINE Injectable 10 milliGRAM(s) IV Push every 2 hours PRN SBP>160      PHYSICAL EXAM:    Vital Signs Last 24 Hrs  T(C): 37 (27 Aug 2024 07:00), Max: 37.2 (26 Aug 2024 13:00)  T(F): 98.6 (27 Aug 2024 07:00), Max: 99 (26 Aug 2024 13:00)  HR: 65 (27 Aug 2024 08:50) (65 - 91)  BP: 125/60 (27 Aug 2024 07:00) (113/55 - 151/68)  BP(mean): 81 (27 Aug 2024 07:00) (60 - 93)  RR: 13 (27 Aug 2024 07:00) (10 - 26)  SpO2: 99% (27 Aug 2024 08:50) (98% - 100%)    Parameters below as of 27 Aug 2024 08:27  Patient On (Oxygen Delivery Method): ventilator        Karnofsky  10 %  General:  Elder man NAD intubated  HEENT: NCAT   ( x )  ET tube   Lungs: comfortable  CV: RR  GI:   soft NTND  MSK: bedbound  Neuro: arouses to voice  Skin: warm dry  Psych:  calm    LABS:                        8.5    8.20  )-----------( 150      ( 27 Aug 2024 03:00 )             27.3     08-27    139  |  104  |  34.2<H>  ----------------------------<  109<H>  4.7   |  26.0  |  1.49<H>    Ca    8.7      27 Aug 2024 03:00  Phos  4.1     08-27  Mg     2.4     08-27        Urinalysis Basic - ( 27 Aug 2024 03:00 )    Color: x / Appearance: x / SG: x / pH: x  Gluc: 109 mg/dL / Ketone: x  / Bili: x / Urobili: x   Blood: x / Protein: x / Nitrite: x   Leuk Esterase: x / RBC: x / WBC x   Sq Epi: x / Non Sq Epi: x / Bacteria: x      I&O's Summary    26 Aug 2024 07:01  -  27 Aug 2024 07:00  --------------------------------------------------------  IN: 1305 mL / OUT: 1835 mL / NET: -530 mL        RADIOLOGY & ADDITIONAL STUDIES:  Imaging reviewed   < from: CT Head No Cont (08.21.24 @ 08:09) >  ACC: [id number] EXAM:  CT BRAIN   ORDERED BY: JARROD MUÑOZ     PROCEDURE DATE:  08/21/2024          INTERPRETATION:  CLINICAL INFORMATION: f/u brainstem stroke s/p   thrombectomy    COMPARISON: CT/CTA of the head August 20, 2024    CONTRAST:  IV Contrast: None  Complications: None    TECHNIQUE:  Serial axial images were obtained from the skull base to the   vertex using multi-slice helical technique. Sagittal and coronal   reformats were obtained.    FINDINGS:    VENTRICLES AND SULCI: Age appropriate involutional changes.  INTRA-AXIAL: Increased attenuation with the mikaela, new when compared to   prior imaging, with small prominent focus in the anterior left hemipons.   Areas of decreased attenuation throughout the deep and periventricular   white matter, compatible with chronic small vessel disease.  EXTRA-AXIAL: No mass or fluid collection. Basal cisterns are normal in   appearance.    VISUALIZED SINUSES:  Scattered mucosal thickening.  TYMPANOMASTOID CAVITIES:  Small right mastoid effusion.  VISUALIZED ORBITS: Bilateral lens replacement.  CALVARIUM: Intact.    MISCELLANEOUS: None.      IMPRESSION:  Increased attenuation within the mikaela, new compared to prior imaging.   Finding likely compatible with a developing hemorrhagic conversion of   patient's known infarct and/or contrast staining. Recommend close   interval imaging for further evaluation.        --- End of Report ---            ALAN VAN MD; Attending Radiologist  This document has been electronically signed. Aug 21 2024  8:17AM    < end of copied text >    < from: TTE W or WO Ultrasound Enhancing Agent (08.21.24 @ 12:49) >      1. Left ventricular cavity is normal in size. Left ventricular systolic function is moderately decreased with an ejection fraction of 37 % by Kern's method of disks.   2. Multiple segmental abnormalitiesexist. See findings.   3. The left ventricular diastolic function is indeterminate.   4. Normal right ventricular cavity size and normal right ventricular systolic function.   5. Left atrium is mildly dilated.   6. The right atrium is normal in size.   7. Moderate mitral regurgitation.   8. Structurally normal mitral valve with normal leaflet excursion.   9. Mild tricuspid regurgitation.  10. Estimated pulmonary artery systolic pressure is 34 mmHg.  11. Lipomatous interatrial septal hypertrophy present.  12. Mild left ventricular hypertrophy.  13. There is mild calcification of the mitral valve annulus.  14. There is no evidence of a left ventricular thrombus.    < end of copied text >        ADVANCE DIRECTIVES/TREATMENT PREFERENCES:  Currently Full code   HPI from chart -  Unable  to obtain secondary to intubation  HPI:  89 Yo M with PMH of HTN, colon cancer, enlarged prostate transfer from [facility] from [facility] for altered mental status. LKW 7PM, Per chart, Patient initially complained of  Nausea,  vomiting and dizziness s/p dinner, later  found to be unresponsive and decorticate posturing in [place] ED. 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 Patient   transferred to [facility] for NeuroIR intervention.     PAST MEDICAL & SURGICAL HISTORY:  Colonic cancer  HTN    HTN (hypertension)    SOCIAL HISTORY:                      Substance history:                    Admitted from:  [facility]                    Christianity/spirituality:                      Cultural concerns:    Baseline ADLs (prior to admission):  Independent/ Dependent                        Surrogate/HCP/Guardian:  Ulices Lovell    FAMILY HISTORY: poor historian      Allergies    Allergy Status Unknown    Intolerances        http://npcrc.org/files/[email or web address]    Present Symptoms:   Dyspnea:  No on vent  Nausea/Vomiting:  No    Anxiety/ Agitation    No   Fatigue:  Unable to assess   Loss of appetite:   N/A  NPO   Constipation:  Not reported   Yes    Pain:    No signs            Location            Character            Duration            Factors            Severity            Effect    Pain AD Score:  [email or web address] (press ctrl + left click to view)    Review of Systems: Reviewed    Unableto obtain due to poor historian - intubated      MEDICATIONS  (STANDING):  albuterol/ipratropium for Nebulization 3 milliLiter(s) Nebulizer every 6 hours  amLODIPine   Tablet 5 milliGRAM(s) Oral daily  atorvastatin 20 milliGRAM(s) Oral daily  bisacodyl Suppository 10 milliGRAM(s) Rectal daily  chlorhexidine 0.12% Liquid 15 milliLiter(s) Oral Mucosa every 12 hours  chlorhexidine 2% Cloths 1 Application(s) Topical daily  ferrous    sulfate Liquid 300 milliGRAM(s) Enteral Tube daily  magnesium hydroxide Suspension 30 milliLiter(s) Oral daily  nystatin Powder 1 Application(s) Topical two times a day  pantoprazole  Injectable 40 milliGRAM(s) IV Push every 12 hours  polyethylene glycol 3350 17 Gram(s) Oral daily  senna 2 Tablet(s) Oral at bedtime  tamsulosin 0.4 milliGRAM(s) Oral at bedtime    MEDICATIONS  (PRN):  acetylcysteine 20%  Inhalation 4 milliLiter(s) Inhalation every 6 hours PRN Thick secretions  hydrALAZINE Injectable 10 milliGRAM(s) IV Push every 2 hours PRN SBP>160      PHYSICAL EXAM:    Vital Signs Last 24 Hrs  T(C): 37 (27 Aug 2024 07:00), Max: 37.2 (26 Aug 2024 13:00)  T(F): 98.6 (27 Aug 2024 07:00), Max: 99 (26 Aug 2024 13:00)  HR: 65 (27 Aug 2024 08:50) (65 - 91)  BP: 125/60 (27 Aug 2024 07:00) (113/55 - 151/68)  BP(mean): 81 (27 Aug 2024 07:00) (60 - 93)  RR: 13 (27 Aug 2024 07:00) (10 - 26)  SpO2: 99% (27 Aug 2024 08:50) (98% - 100%)    Parameters below as of 27 Aug 2024 08:27  Patient On (Oxygen Delivery Method): ventilator        Karnofsky  10 %  General:  Elder man NAD intubated  HEENT: NCAT   ( x )  ET tube   Lungs: comfortable  CV: RR  GI:   soft NTND  MSK: bedbound  Neuro: arouses to voice  Skin: warm dry  Psych:  calm    LABS:                        8.5    8.20  )-----------( 150      ( 27 Aug 2024 03:00 )             27.3     08-27    139  |  104  |  34.2<H>  ----------------------------<  109<H>  4.7   |  26.0  |  1.49<H>    Ca    8.7      27 Aug 2024 03:00  Phos  4.1     08-27  Mg     2.4     08-27        Urinalysis Basic - ( 27 Aug 2024 03:00 )    Color: x / Appearance: x / SG: x / pH: x  Gluc: 109 mg/dL / Ketone: x  / Bili: x / Urobili: x   Blood: x / Protein: x / Nitrite: x   Leuk Esterase: x / RBC: x / WBC x   Sq Epi: x / Non Sq Epi: x / Bacteria: x      I&O's Summary    26 Aug 2024 07:01  -  27 Aug 2024 07:00  --------------------------------------------------------  IN: 1305 mL / OUT: 1835 mL / NET: -530 mL        RADIOLOGY & ADDITIONAL STUDIES:  < from: Xray Chest 1 View- PORTABLE-Urgent (Xray Chest 1 View- PORTABLE-Urgent .) (08.26.24 @ 09:36) >    ACC: [id number] EXAM:  XR CHEST PORTABLE URGENT 1V   ORDERED BY: LINH MALDONADO     PROCEDURE DATE:  08/26/2024          INTERPRETATION:  TECHNIQUE: Single portable view of the chest.    COMPARISON:  8/25/2024    CLINICAL HISTORY: CXR    FINDINGS:    Single frontal view of the chest demonstrates small bilateral   effusions/atelectasis. The cardiomediastinal silhouette is unremarkable.   No acute osseous abnormalities. Overlying EKG leads and wires are noted.   NG tube tip below the hemidiaphragm region of the stomach. Endotracheal   tube tip is above the judson. Transcatheter aortic valve replacement.    IMPRESSION: Small bilateral effusion/atelectasis.    --- End of Report ---          < end of copied text >    Imaging reviewed   < from: CT Head No Cont (08.21.24 @ 08:09) >  ACC: [id number] EXAM:  CT BRAIN   ORDERED BY: JARROD MUÑOZ     PROCEDURE DATE:  08/21/2024          INTERPRETATION:  CLINICAL INFORMATION: f/u brainstem stroke s/p   thrombectomy    COMPARISON: CT/CTA of the head August 20, 2024    CONTRAST:  IV Contrast: None  Complications: None    TECHNIQUE:  Serial axial images were obtained from the skull base to the   vertex using multi-slice helical technique. Sagittal and coronal   reformats were obtained.    FINDINGS:    VENTRICLES AND SULCI: Age appropriate involutional changes.  INTRA-AXIAL: Increased attenuation with the mikaela, new when compared to   prior imaging, with small prominent focus in the anterior left hemipons.   Areas of decreased attenuation throughout the deep and periventricular   white matter, compatible with chronic small vessel disease.  EXTRA-AXIAL: No mass or fluid collection. Basal cisterns are normal in   appearance.    VISUALIZED SINUSES:  Scattered mucosal thickening.  TYMPANOMASTOID CAVITIES:  Small right mastoid effusion.  VISUALIZED ORBITS: Bilateral lens replacement.  CALVARIUM: Intact.    MISCELLANEOUS: None.      IMPRESSION:  Increased attenuation within the mikaela, new compared to prior imaging.   Finding likely compatible with a developing hemorrhagic conversion of   patient's known infarct and/or contrast staining. Recommend close   interval imaging for further evaluation.        --- End of Report ---            ALAN VAN MD; Attending Radiologist  This document has been electronically signed. Aug 21 2024  8:17AM    < end of copied text >    < from: TTE W or WO Ultrasound Enhancing Agent (08.21.24 @ 12:49) >      1. Left ventricular cavity is normal in size. Left ventricular systolic function is moderately decreased with an ejection fraction of 37 % by Kern's method of disks.   2. Multiple segmental abnormalitiesexist. See findings.   3. The left ventricular diastolic function is indeterminate.   4. Normal right ventricular cavity size and normal right ventricular systolic function.   5. Left atrium is mildly dilated.   6. The right atrium is normal in size.   7. Moderate mitral regurgitation.   8. Structurally normal mitral valve with normal leaflet excursion.   9. Mild tricuspid regurgitation.  10. Estimated pulmonary artery systolic pressure is 34 mmHg.  11. Lipomatous interatrial septal hypertrophy present.  12. Mild left ventricular hypertrophy.  13. There is mild calcification of the mitral valve annulus.  14. There is no evidence of a left ventricular thrombus.    < end of copied text >        ADVANCE DIRECTIVES/TREATMENT PREFERENCES:  Currently Full code

## 2024-08-27 NOTE — PROGRESS NOTE ADULT - SUBJECTIVE AND OBJECTIVE BOX
NSICU ATTENDING PROGRESS NOTE    HPI:  89 Yo M with PMH of HTN, colon cancer, enlarged prostate transfer from [facility] from [facility] for altered mental status. LKW 7PM, Per chart, Patient initially complained of  Nausea,  vomiting and dizziness s/p dinner, later  found to be unresponsive and decorticate posturing in [place] ED. 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 Patient   transferred to [facility] for NeuroIR intervention.   l (21 Aug 2024 03:06)    24h events:  8/24 - febrile overnight -> fever work-up sent  8/25 - following commands, developed bloody in-line secretions; family discussions - prefer to minimize sedation  8/26 - ***    Exam:  +EO, no FC, PERRL 2 mm BL, ?tracking, + cough  showed 2 fingers/thumbs up on LUE  motor - moves L side spont, RUE weak wdrl, RLE wdrl.  regular rate and rhythm  normal WOB on PS 10/5, +bloody secretions in-line and oral  Abd soft, NT, ND, LBM PTA  warm/dry, No peripheral swelling    -----------------------------------------------------------------------------------------------------  ICU Vital Signs Last 24 Hrs  T(C): 37 (27 Aug 2024 07:00), Max: 37.3 (26 Aug 2024 09:00)  T(F): 98.6 (27 Aug 2024 07:00), Max: 99.1 (26 Aug 2024 09:00)  HR: 76 (27 Aug 2024 07:00) (65 - 93)  BP: 125/60 (27 Aug 2024 07:00) (113/55 - 151/68)  BP(mean): 81 (27 Aug 2024 07:00) (60 - 93)  ABP: --  ABP(mean): --  RR: 13 (27 Aug 2024 07:00) (10 - 26)  SpO2: 100% (27 Aug 2024 07:00) (98% - 100%)    O2 Parameters below as of 27 Aug 2024 04:00  Patient On (Oxygen Delivery Method): ventilator    O2 Concentration (%): 30        I&O's Summary    26 Aug 2024 07:01  -  27 Aug 2024 07:00  --------------------------------------------------------  IN: 1305 mL / OUT: 1835 mL / NET: -530 mL        MEDICATIONS  (STANDING):  albuterol/ipratropium for Nebulization 3 milliLiter(s) Nebulizer every 6 hours  amLODIPine   Tablet 5 milliGRAM(s) Oral daily  atorvastatin 20 milliGRAM(s) Oral daily  bisacodyl Suppository 10 milliGRAM(s) Rectal daily  chlorhexidine 0.12% Liquid 15 milliLiter(s) Oral Mucosa every 12 hours  chlorhexidine 2% Cloths 1 Application(s) Topical daily  ferrous    sulfate Liquid 300 milliGRAM(s) Enteral Tube daily  magnesium hydroxide Suspension 30 milliLiter(s) Oral daily  nystatin Powder 1 Application(s) Topical two times a day  pantoprazole  Injectable 40 milliGRAM(s) IV Push every 12 hours  polyethylene glycol 3350 17 Gram(s) Oral daily  senna 2 Tablet(s) Oral at bedtime  tamsulosin 0.4 milliGRAM(s) Oral at bedtime      RESPIRATORY:  Mode: CPAP with PS  FiO2: 30  PEEP: 10  MAP: 9  PC: 6        IMAGING:   Recent imaging studies were reviewed.    LAB RESULTS:                          8.5    8.20  )-----------( 150      ( 27 Aug 2024 03:00 )             27.3           08-27    139  |  104  |  34.2<H>  ----------------------------<  109<H>  4.7   |  26.0  |  1.49<H>    Ca    8.7      27 Aug 2024 03:00  Phos  4.1     08-27  Mg     2.4     08-27                  -----------------------------------------------------------------------------------------------------------------------------------------------------------------------------------           NSICU ATTENDING PROGRESS NOTE    HPI:  89 Yo M with PMH of HTN, colon cancer, enlarged prostate transfer from [facility] from [facility] for altered mental status. LKW 7PM, Per chart, Patient initially complained of  Nausea,  vomiting and dizziness s/p dinner, later  found to be unresponsive and decorticate posturing in [place] ED. 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 Patient   transferred to [facility] for NeuroIR intervention.   l (21 Aug 2024 03:06)    24h events:  8/24 - febrile overnight -> fever work-up sent  8/25 - following commands, developed bloody in-line secretions; family discussions - prefer to minimize sedation  8/26 - failed CPAP in afternoon -> tolerating again this morning    Exam:  +EO, no FC, PERRL 2 mm BL, +tracking, + cough  showed 2 fingers/thumbs up on LUE  pupils reactive, difficulty abducting L eye, face symmetric, does not protrude tongue  motor - moves L side spont in plane of bed, RUE weak wdrl, RLE wdrl.  regular rate, intermittent afib  normal WOB on PS 10/5, +oral secretions, thin minimal in-line secretions  Abd soft, NT, ND, LBM 8/27  Thomas with yellow urine  warm/dry, No peripheral swelling    -----------------------------------------------------------------------------------------------------  ICU Vital Signs Last 24 Hrs  T(C): 37 (27 Aug 2024 07:00), Max: 37.3 (26 Aug 2024 09:00)  T(F): 98.6 (27 Aug 2024 07:00), Max: 99.1 (26 Aug 2024 09:00)  HR: 76 (27 Aug 2024 07:00) (65 - 93)  BP: 125/60 (27 Aug 2024 07:00) (113/55 - 151/68)  BP(mean): 81 (27 Aug 2024 07:00) (60 - 93)  RR: 13 (27 Aug 2024 07:00) (10 - 26)  SpO2: 100% (27 Aug 2024 07:00) (98% - 100%)    O2 Parameters below as of 27 Aug 2024 04:00  Patient On (Oxygen Delivery Method): ventilator    O2 Concentration (%): 30        I&O's Summary    26 Aug 2024 07:01  -  27 Aug 2024 07:00  --------------------------------------------------------  IN: 1305 mL / OUT: 1835 mL / NET: -530 mL        MEDICATIONS  (STANDING):  albuterol/ipratropium for Nebulization 3 milliLiter(s) Nebulizer every 6 hours  amLODIPine   Tablet 5 milliGRAM(s) Oral daily  atorvastatin 20 milliGRAM(s) Oral daily  bisacodyl Suppository 10 milliGRAM(s) Rectal daily  chlorhexidine 0.12% Liquid 15 milliLiter(s) Oral Mucosa every 12 hours  chlorhexidine 2% Cloths 1 Application(s) Topical daily  ferrous    sulfate Liquid 300 milliGRAM(s) Enteral Tube daily  magnesium hydroxide Suspension 30 milliLiter(s) Oral daily  nystatin Powder 1 Application(s) Topical two times a day  pantoprazole  Injectable 40 milliGRAM(s) IV Push every 12 hours  polyethylene glycol 3350 17 Gram(s) Oral daily  senna 2 Tablet(s) Oral at bedtime  tamsulosin 0.4 milliGRAM(s) Oral at bedtime      RESPIRATORY:  Mode: CPAP with PS 10/5    IMAGING:   CTH - stable pontine and cerebellar infarcts    LAB RESULTS:             8.5    8.20  )-----------( 150      ( 27 Aug 2024 03:00 )             27.3     08-27    139  |  104  |  34.2<H>  ----------------------------<  109<H>  4.7   |  26.0  |  1.49<H>    Ca    8.7      27 Aug 2024 03:00  Phos  4.1     08-27  Mg     2.4     08-27    sputum: proteus mirabilis    -----------------------------------------------------------------------------------------------------------------------------------------------------------------------------------

## 2024-08-27 NOTE — PROGRESS NOTE ADULT - CRITICAL CARE ATTENDING COMMENT
Pt is critically ill and at high risk of rapid deterioration/death due to abovementioned conditions.   Still requires critical care interventions - ongoing frequent evaluations, interventions and management adjustment by the Attending and ICU team - and included review of relevant history, clinical examination, review of data and images, updating family, discussion of treatment with the multidisciplinary team and any consultants involved in this patient’s care as well as family discussion. Pt is critically ill and at high risk of rapid deterioration/death due to abovementioned conditions.   Still requires critical care interventions - ongoing frequent evaluations, interventions and management adjustment by the Attending and ICU team - and included review of relevant history, clinical examination, review of data and images, GOC discussion with family, discussion of treatment with the multidisciplinary team and any consultants involved in this patient’s care as well as family discussion.

## 2024-08-27 NOTE — PROGRESS NOTE ADULT - ASSESSMENT
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   Acute resp failure due to neurologic injury.  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  H/o Stage 3 colon cancer in 2009, had surgery; hemorrhoids, h/o LGIB.  HARINDER on CKD vs worsening CKD, suspect component of KENYON  Fever     Plan: ***  - neurochecks q4h  - pain control: tylenol PRN, minimizing sedation  - mobility: ROM in bed   - SBP goal 100-160; cont Amlodipine 5mg daily, statin  - maintain Osats>92%, vent support, limit SBT to 1-2 hrs  - chest PT, Mucomyst/alb nebs; repeat CXR  - monitor e-lytes, I/Os, Thomas while awaiting BM, flomax  - cont TF, BM regimen -> advance, PPI BID; cont FW for HARINDER  - maintain -180, ISS  - monitor WBC and fever curve - holding off on abx, fever work-up in process  - DVT prophylaxis: SCDs; home iron supplements  - holding ASA and chemoppx while having bloody secretions    Dispo: ongoing GOC discussion with family, full code       89M HTN, HLD, 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   Acute resp failure due to neurologic injury.  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  H/o Stage 3 colon cancer in 2009, had surgery; hemorrhoids, h/o LGIB.  HARINDER on CKD vs worsening CKD, suspect component of KENYON  Fever     Plan:   - neurochecks q4h  - pain control: tylenol PRN, minimizing sedation  - mobility: ROM in bed   - SBP goal 100-160; cont Amlodipine 5mg daily, statin  - maintain Osats>92%, PS as tolerated  - chest PT, Mucomyst/alb nebs  - monitor e-lytes, I/Os, Thomas while awaiting BM, flomax 0.4mg  - hold TF+FWFs while tolerating CPAP, BM regimen, PPI BID  - maintain -180, ISS  - consult ID for proteus in sputum, monitor WBC and fever curve   - DVT prophylaxis: SCDs; iron supplements  - holding ASA and chemoppx while having bloody secretions    Dispo: family meeting 3PM, full code

## 2024-08-27 NOTE — CONSULT NOTE ADULT - TIME BILLING
Time spent with review of chart documents, labs, imaging. Direct patient assessment,  formulation of care plan. Discussion with  Interdisciplinary  team  with an additional  ACP  of  47    minutes with MOLST   completion.  This time is exclusive of the encounter

## 2024-08-27 NOTE — CONSULT NOTE ADULT - CONVERSATION DETAILS
Met with family.  Son Ulices is reported HCP.  Offered to have other daughter on speaker phone for which they declined.  Family seemed to have a good understanding of patient's condition. They were updated patient now with PNA,  needing more assistance  from the vent. Informed patient to be treated and maybe respiratory status will improve.  However, if not, then will need to make a decision about trach/peg.    Family understands.  Son expressed how anyone would want to live with a trach/peg in a NH.   We discussed CPR and its limited benefit if a CPA would happen.  Both son and daughter agreed to a DNR.   In the interim, medical treatments would continue but would not resuscitate in the event of a cardiac arrest.     Questions answered. Psychosocial support.

## 2024-08-27 NOTE — CONSULT NOTE ADULT - ASSESSMENT
IN PROGRESS 89yr man  from [facility]  hx HTN, HLD admitted with basilar occlusion, s/p revascularization on 8/21/24 with ARF    CVA/ Basilar Occlusion  Care per NSICU  neuro checks  on TF    Acute Respiratory Failure  PNA  wean as tolerated  monitor sats  IV abx    HARINDER on CKD  avoid nephrotoxic agents    Debility  Assist with care  Turn/reposition  Safety precautions    Encounter for Palliative Care  Palliative Care consulted to assist with GOC  Son Ulices is the reported HCP.   Met with family. See GOC note above for details.  In summary  1.  Family aware if patient unable to wean from vent then would need to make decision about trach/peg.    2.  Agreed to DNR - MOLST completed    PC to continue to support and follow

## 2024-08-28 LAB
ANION GAP SERPL CALC-SCNC: 9 MMOL/L — SIGNIFICANT CHANGE UP (ref 5–17)
BUN SERPL-MCNC: 32.7 MG/DL — HIGH (ref 8–20)
CALCIUM SERPL-MCNC: 8.3 MG/DL — LOW (ref 8.4–10.5)
CHLORIDE SERPL-SCNC: 101 MMOL/L — SIGNIFICANT CHANGE UP (ref 96–108)
CO2 SERPL-SCNC: 25 MMOL/L — SIGNIFICANT CHANGE UP (ref 22–29)
CREAT SERPL-MCNC: 1.39 MG/DL — HIGH (ref 0.5–1.3)
EGFR: 48 ML/MIN/1.73M2 — LOW
GLUCOSE SERPL-MCNC: 137 MG/DL — HIGH (ref 70–99)
HCT VFR BLD CALC: 24.8 % — LOW (ref 39–50)
HGB BLD-MCNC: 7.9 G/DL — LOW (ref 13–17)
MAGNESIUM SERPL-MCNC: 2.3 MG/DL — SIGNIFICANT CHANGE UP (ref 1.6–2.6)
MCHC RBC-ENTMCNC: 27.3 PG — SIGNIFICANT CHANGE UP (ref 27–34)
MCHC RBC-ENTMCNC: 31.9 GM/DL — LOW (ref 32–36)
MCV RBC AUTO: 85.8 FL — SIGNIFICANT CHANGE UP (ref 80–100)
PHOSPHATE SERPL-MCNC: 3.5 MG/DL — SIGNIFICANT CHANGE UP (ref 2.4–4.7)
PLATELET # BLD AUTO: 167 K/UL — SIGNIFICANT CHANGE UP (ref 150–400)
POTASSIUM SERPL-MCNC: 4.5 MMOL/L — SIGNIFICANT CHANGE UP (ref 3.5–5.3)
POTASSIUM SERPL-SCNC: 4.5 MMOL/L — SIGNIFICANT CHANGE UP (ref 3.5–5.3)
RBC # BLD: 2.89 M/UL — LOW (ref 4.2–5.8)
RBC # FLD: 14.6 % — HIGH (ref 10.3–14.5)
SODIUM SERPL-SCNC: 135 MMOL/L — SIGNIFICANT CHANGE UP (ref 135–145)
WBC # BLD: 7.16 K/UL — SIGNIFICANT CHANGE UP (ref 3.8–10.5)
WBC # FLD AUTO: 7.16 K/UL — SIGNIFICANT CHANGE UP (ref 3.8–10.5)

## 2024-08-28 PROCEDURE — 99222 1ST HOSP IP/OBS MODERATE 55: CPT

## 2024-08-28 PROCEDURE — 99291 CRITICAL CARE FIRST HOUR: CPT

## 2024-08-28 PROCEDURE — 99233 SBSQ HOSP IP/OBS HIGH 50: CPT

## 2024-08-28 RX ORDER — ASPIRIN 81 MG
81 TABLET, DELAYED RELEASE (ENTERIC COATED) ORAL DAILY
Refills: 0 | Status: DISCONTINUED | OUTPATIENT
Start: 2024-08-28 | End: 2024-08-31

## 2024-08-28 RX ORDER — HEPARIN SODIUM,BOVINE 1000/ML
5000 VIAL (ML) INJECTION EVERY 8 HOURS
Refills: 0 | Status: ACTIVE | OUTPATIENT
Start: 2024-08-28 | End: 2025-07-27

## 2024-08-28 RX ORDER — ERTAPENEM SODIUM 1 G/1
1000 INJECTION, POWDER, LYOPHILIZED, FOR SOLUTION INTRAMUSCULAR; INTRAVENOUS EVERY 24 HOURS
Refills: 0 | Status: COMPLETED | OUTPATIENT
Start: 2024-08-28 | End: 2024-08-31

## 2024-08-28 RX ADMIN — Medication 40 MILLIGRAM(S): at 17:53

## 2024-08-28 RX ADMIN — Medication 20 MILLIGRAM(S): at 11:53

## 2024-08-28 RX ADMIN — AMLODIPINE BESYLATE 5 MILLIGRAM(S): 10 TABLET ORAL at 06:02

## 2024-08-28 RX ADMIN — IPRATROPIUM BROMIDE AND ALBUTEROL SULFATE 3 MILLILITER(S): .5; 3 SOLUTION RESPIRATORY (INHALATION) at 08:24

## 2024-08-28 RX ADMIN — Medication 2 TABLET(S): at 21:51

## 2024-08-28 RX ADMIN — Medication 300 MILLIGRAM(S): at 11:53

## 2024-08-28 RX ADMIN — CHLORHEXIDINE GLUCONATE 15 MILLILITER(S): 40 SOLUTION TOPICAL at 06:02

## 2024-08-28 RX ADMIN — Medication 40 MILLIGRAM(S): at 06:01

## 2024-08-28 RX ADMIN — POLYETHYLENE GLYCOL 3350 17 GRAM(S): 17 POWDER, FOR SOLUTION ORAL at 11:52

## 2024-08-28 RX ADMIN — IPRATROPIUM BROMIDE AND ALBUTEROL SULFATE 3 MILLILITER(S): .5; 3 SOLUTION RESPIRATORY (INHALATION) at 04:25

## 2024-08-28 RX ADMIN — Medication 5000 UNIT(S): at 13:48

## 2024-08-28 RX ADMIN — IPRATROPIUM BROMIDE AND ALBUTEROL SULFATE 3 MILLILITER(S): .5; 3 SOLUTION RESPIRATORY (INHALATION) at 15:47

## 2024-08-28 RX ADMIN — SODIUM CHLORIDE 4 MILLILITER(S): 9 INJECTION INTRAMUSCULAR; INTRAVENOUS; SUBCUTANEOUS at 20:06

## 2024-08-28 RX ADMIN — ERTAPENEM SODIUM 120 MILLIGRAM(S): 1 INJECTION, POWDER, LYOPHILIZED, FOR SOLUTION INTRAMUSCULAR; INTRAVENOUS at 17:54

## 2024-08-28 RX ADMIN — SODIUM CHLORIDE 4 MILLILITER(S): 9 INJECTION INTRAMUSCULAR; INTRAVENOUS; SUBCUTANEOUS at 08:24

## 2024-08-28 RX ADMIN — CHLORHEXIDINE GLUCONATE 15 MILLILITER(S): 40 SOLUTION TOPICAL at 17:53

## 2024-08-28 RX ADMIN — Medication 5000 UNIT(S): at 21:50

## 2024-08-28 RX ADMIN — Medication 1 APPLICATION(S): at 06:02

## 2024-08-28 RX ADMIN — CHLORHEXIDINE GLUCONATE 1 APPLICATION(S): 40 SOLUTION TOPICAL at 11:53

## 2024-08-28 RX ADMIN — Medication 81 MILLIGRAM(S): at 11:53

## 2024-08-28 RX ADMIN — IPRATROPIUM BROMIDE AND ALBUTEROL SULFATE 3 MILLILITER(S): .5; 3 SOLUTION RESPIRATORY (INHALATION) at 20:05

## 2024-08-28 RX ADMIN — TAMSULOSIN HYDROCHLORIDE 0.4 MILLIGRAM(S): 0.4 CAPSULE ORAL at 21:50

## 2024-08-28 RX ADMIN — Medication 1 APPLICATION(S): at 17:53

## 2024-08-28 NOTE — CHART NOTE - NSCHARTNOTEFT_GEN_A_CORE
Source: Patient [ ]  Family [ ]   other [x] EMR    Current Diet: Diet, NPO:   NPO for Procedure/Test     NPO Start Date: 27-Aug-2024,   NPO Start Time: 04:00 (08-26-24 @ 17:50)  Diet, NPO with Tube Feed:   Tube Feeding Modality: Nasogastric  Jevity 1.5 Jourdan (JEVITY1.5)  Total Volume for 24 Hours (mL): 1080  Continuous  Starting Tube Feed Rate {mL per Hour}: 10  Increase Tube Feed Rate by (mL): 10     Every 4 hours  Until Goal Tube Feed Rate (mL per Hour): 45  Tube Feed Duration (in Hours): 24  Tube Feed Start Time: 19:00 (08-21-24 @ 18:00)    Enteral /Parenteral Nutrition:   Jevity 1.5 @ 45 cc/hr x 24 hrs = 1080 ml, 1890 kcals, 118 g pro, 945 ml free water; meeting ~73% est energy needs, 66% est protein needs  Additional free water flushes 200 ml q 6 hrs (800 ml total per day)    Current Weight:   8/28 77.8 kg  8/21 74.0 kg  no edema documented, ?accuracy of weights/bed scale     Pertinent Medications: MEDICATIONS  (STANDING):  ferrous    sulfate Liquid 300 milliGRAM(s) Enteral Tube daily  pantoprazole  Injectable 40 milliGRAM(s) IV Push every 12 hours  polyethylene glycol 3350 17 Gram(s) Oral daily  senna 2 Tablet(s) Oral at bedtime    MEDICATIONS  (PRN):  bisacodyl Suppository 10 milliGRAM(s) Rectal daily PRN Constipation  magnesium hydroxide Suspension 30 milliLiter(s) Oral daily PRN Constipation    Pertinent Labs: 08-28 Na135 mmol/L Glu 137 mg/dL<H> K+ 4.5 mmol/L Cr  1.39 mg/dL<H> BUN 32.7 mg/dL<H> Phos 3.5 mg/dL    Skin: MAD, R ankle stage 2 pressure injury    Nutrition focused physical exam conducted - found signs of malnutrition [ ]absent [ ]present    Subcutaneous fat loss: [ ] Orbital fat pads region, [ ]Buccal fat region, [ ]Triceps region,  [ ]Ribs region    Muscle wasting: [ ]Temples region, [ ]Clavicle region, [ ]Shoulder region, [ ]Scapula region, [ ]Interosseous region,  [ ]thigh region, [ ]Calf region    Estimated Needs:   [x] no change since previous assessment  4458-9287 kcals/day (based on 30-35 kcal/kg BW 74 kg)  104-118 g pro/day (based on 1.4-1.6 g/kg BW 74 kg)    Clinical Course: 89 Yo M with PMH of HTN, colon cancer, enlarged prostate transfer from [facility] from [facility] for altered mental status. LKW 7PM, Per chart, Patient initially complained of  Nausea,  vomiting and dizziness s/p dinner, later  found to be unresponsive and decorticate posturing in [place] ED. 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 Patient  transferred to [facility] for NeuroIR intervention. S/p TICI 2B revascularization on 8/21. S/p thrombectomy. Acute resp failure due to neurologic injury.    Current Nutrition Diagnosis: Increased Nutrient Needs (energy, protein) related to increased physiological demand of nutrient as evidenced by cerebral injury with subsequent acute respiratory failure requiring mechanical ventilation.    Chart and events reviewed, pt remains intubated, NGT in place. TF Jevity 1.5 was started on 8/21 @ 10 ml/hr and reached goal rate 45 cc/hr on 8/22. TF was held on 8/26, re-started at current goal rate 8/27. Tolerated, last BM yesterday. TF now on hold again for CPAP trials. Current ordered TF regimen not meeting adequate energy/protein needs. Recommendations below:    Recommendations:   1. When TFs to re-start, suggest to change formuka to Pivot 1.5 to meet increased energy/protein needs  Pivot 1.5 @ 45 cc/hr x 18 hrs to allow for periods of bowel rest (1260 ml, 1890 kcals, 118 g pro, 945 ml free water, > 100% RDIs vitamins/minerals)  Additional free water per medical teams discretion   2. Rx: Vit C 500 mg daily to promote wound healing  3. Monitor nutrition related labs, weight trends, BMs and I/Os  4. Follow palliative GOC plans      Monitoring and Evaluation:   [x] Tolerance to diet prescription [X] Weights  [X] Follow up per protocol [X] Labs: chem 8, phos, mg, BGM

## 2024-08-28 NOTE — PROGRESS NOTE ADULT - SUBJECTIVE AND OBJECTIVE BOX
CC:  Follow up GOC , Symptoms    OVERNIGHT EVENTS:  on CPAP    Present Symptoms:   Dyspnea:  No  on vent  Nausea/Vomiting:  No    Anxiety/ Agitation No     Depression:   Unable to assess  Fatigue:     Unable  to assess  Loss of appetite:  NA   Constipation: Not Reported      Pain:   No Signs            Location            Duration            Character            Severity            Factors            Effect    Pain AD Score:  [email or web address] (press ctrl + left click to view)    Review of Systems: Reviewed as above    Further ROS unable to  obtain due to poor mentation         MEDICATIONS  (STANDING):  albuterol/ipratropium for Nebulization 3 milliLiter(s) Nebulizer every 6 hours  amLODIPine   Tablet 5 milliGRAM(s) Oral daily  aspirin  chewable 81 milliGRAM(s) Oral daily  atorvastatin 20 milliGRAM(s) Oral daily  chlorhexidine 0.12% Liquid 15 milliLiter(s) Oral Mucosa every 12 hours  chlorhexidine 2% Cloths 1 Application(s) Topical daily  ertapenem  IVPB 500 milliGRAM(s) IV Intermittent every 24 hours  ferrous    sulfate Liquid 300 milliGRAM(s) Enteral Tube daily  heparin   Injectable 5000 Unit(s) SubCutaneous every 8 hours  nystatin Powder 1 Application(s) Topical two times a day  pantoprazole  Injectable 40 milliGRAM(s) IV Push every 12 hours  polyethylene glycol 3350 17 Gram(s) Oral daily  senna 2 Tablet(s) Oral at bedtime  sodium chloride 3%  Inhalation 4 milliLiter(s) Inhalation every 12 hours  tamsulosin 0.4 milliGRAM(s) Oral at bedtime    MEDICATIONS  (PRN):  acetylcysteine 20%  Inhalation 4 milliLiter(s) Inhalation every 6 hours PRN Thick secretions  bisacodyl Suppository 10 milliGRAM(s) Rectal daily PRN Constipation  hydrALAZINE Injectable 10 milliGRAM(s) IV Push every 2 hours PRN SBP>160  magnesium hydroxide Suspension 30 milliLiter(s) Oral daily PRN Constipation      PHYSICAL EXAM:    Vital Signs Last 24 Hrs  T(C): 37.1 (28 Aug 2024 12:00), Max: 37.7 (27 Aug 2024 18:00)  T(F): 98.8 (28 Aug 2024 12:00), Max: 99.9 (27 Aug 2024 18:00)  HR: 71 (28 Aug 2024 12:43) (66 - 91)  BP: 107/87 (28 Aug 2024 12:00) (107/87 - 153/83)  BP(mean): 95 (28 Aug 2024 12:00) (74 - 101)  RR: 16 (28 Aug 2024 12:00) (10 - 16)  SpO2: 100% (28 Aug 2024 12:43) (97% - 100%)    Parameters below as of 28 Aug 2024 12:00  Patient On (Oxygen Delivery Method): ventilator    O2 Concentration (%): 30    Karnofsky: 10 %  General:  Elder man intubated NAD       HEENT:  NCAT   ( x )  ET tube    Lungs: comfortable  non labored  CV:  RR  GI:    soft NTND  MSK: bedbound  Skin:  warm/dry  Neuro  NR  does not follow commands   Psych NA    LABS:                          7.9    7.16  )-----------( 167      ( 28 Aug 2024 04:21 )             24.8     08-28    135  |  101  |  32.7<H>  ----------------------------<  137<H>  4.5   |  25.0  |  1.39<H>    Ca    8.3<L>      28 Aug 2024 04:21  Phos  3.5     08-28  Mg     2.3     08-28        Urinalysis Basic - ( 28 Aug 2024 04:21 )    Color: x / Appearance: x / SG: x / pH: x  Gluc: 137 mg/dL / Ketone: x  / Bili: x / Urobili: x   Blood: x / Protein: x / Nitrite: x   Leuk Esterase: x / RBC: x / WBC x   Sq Epi: x / Non Sq Epi: x / Bacteria: x      I&O's Summary    27 Aug 2024 07:01  -  28 Aug 2024 07:00  --------------------------------------------------------  IN: 1300 mL / OUT: 1100 mL / NET: 200 mL    28 Aug 2024 07:01  -  28 Aug 2024 12:52  --------------------------------------------------------  IN: 180 mL / OUT: 100 mL / NET: 80 mL        RADIOLOGY & ADDITIONAL STUDIES:  Imaging Reviewed  ( x  )   < from: CT Head No Cont (08.27.24 @ 08:52) >    ACC: [id number] EXAM:  CT BRAIN   ORDERED BY: MELA ASTUDILLO     PROCEDURE DATE:  08/27/2024          INTERPRETATION:  CLINICAL INDICATIONS:  Stability scan    COMPARISON: Head CT dated 8/21/2024    TECHNIQUE: Noncontrast CT of the head. Multiplanar reformations are   submitted.    FINDINGS:  Stable pontine infarct. Stable bilateral cerebellar hemisphere   infarctions. Previously seen hyperdensity in the mikaela has resolved.    There is periventricular and subcortical white matter hypodensity without   mass effect, nonspecific, likely representing mild chronic microvascular   ischemic changes. There is no evidence of mass, mass effect, midline   shift or extra-axial fluid collection. The lateral ventricles and   cortical sulci are age-appropriate in size and configuration.    Right-sided approach NG tube. Small right mastoid air cell effusion. Mild   inflammatory mucosal changes are seen throughout the various portions of   the paranasal sinuses. The patient is status post bilateral ocular lens   replacement surgery. The orbits and left mastoid air cells are   unremarkable. The calvarium is intact. Consider MRI as clinically   warranted.    IMPRESSION: Stable pontine infarct. Stable bilateral cerebellar   hemisphere infarctions. Previously seen hyperdensity in the mikaela has   resolved.    --- End of Report ---      < end of copied text >            ADVANCE DIRECTIVE PREFERENCES    DNR continuing medical treatments

## 2024-08-28 NOTE — PROGRESS NOTE ADULT - SUBJECTIVE AND OBJECTIVE BOX
NSICU ATTENDING PROGRESS NOTE    HPI:  89 Yo M with PMH of HTN, colon cancer, enlarged prostate transfer from [facility] from [facility] for altered mental status. LKW 7PM, Per chart, Patient initially complained of  Nausea,  vomiting and dizziness s/p dinner, later  found to be unresponsive and decorticate posturing in [facility]. 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 Patient   transferred to [facility] for NeuroIR intervention.   l (21 Aug 2024 03:06)    24h events:  8/24 - febrile overnight -> fever work-up sent  8/25 - following commands, developed bloody in-line secretions; family discussions - prefer to minimize sedation  8/26 - Tolerating PSV with occasional failed breath triggering, possible mild breathing apraxia     Exam:  +EO, no FC, PERRL 2 mm BL, +tracking, + cough  showed 2 fingers/thumbs up on LUE  pupils reactive, difficulty abducting L eye, face symmetric, does not protrude tongue  motor - moves L side spont in plane of bed, RUE weak wdrl, RLE wdrl.  regular rate, intermittent afib  normal WOB on PSV  Abd soft, NT, ND,   Thomas with yellow urine  warm/dry, No peripheral swelling    ------------------------------------------------------------------------------------------------------  ICU Vital Signs Last 24 Hrs  T(C): 37.1 (28 Aug 2024 12:00), Max: 37.7 (27 Aug 2024 18:00)  T(F): 98.8 (28 Aug 2024 12:00), Max: 99.9 (27 Aug 2024 18:00)  HR: 71 (28 Aug 2024 12:43) (66 - 91)  BP: 107/87 (28 Aug 2024 12:00) (107/87 - 153/83)  BP(mean): 95 (28 Aug 2024 12:00) (74 - 101)  ABP: --  ABP(mean): --  RR: 16 (28 Aug 2024 12:00) (10 - 16)  SpO2: 100% (28 Aug 2024 12:43) (97% - 100%)    O2 Parameters below as of 28 Aug 2024 12:00  Patient On (Oxygen Delivery Method): ventilator    O2 Concentration (%): 30        I&O's Summary    27 Aug 2024 07:01  -  28 Aug 2024 07:00  --------------------------------------------------------  IN: 1300 mL / OUT: 1100 mL / NET: 200 mL    28 Aug 2024 07:01  -  28 Aug 2024 13:23  --------------------------------------------------------  IN: 180 mL / OUT: 100 mL / NET: 80 mL        MEDICATIONS  (STANDING):  albuterol/ipratropium for Nebulization 3 milliLiter(s) Nebulizer every 6 hours  amLODIPine   Tablet 5 milliGRAM(s) Oral daily  aspirin  chewable 81 milliGRAM(s) Oral daily  atorvastatin 20 milliGRAM(s) Oral daily  chlorhexidine 0.12% Liquid 15 milliLiter(s) Oral Mucosa every 12 hours  chlorhexidine 2% Cloths 1 Application(s) Topical daily  ertapenem  IVPB 500 milliGRAM(s) IV Intermittent every 24 hours  ferrous    sulfate Liquid 300 milliGRAM(s) Enteral Tube daily  heparin   Injectable 5000 Unit(s) SubCutaneous every 8 hours  nystatin Powder 1 Application(s) Topical two times a day  pantoprazole  Injectable 40 milliGRAM(s) IV Push every 12 hours  polyethylene glycol 3350 17 Gram(s) Oral daily  senna 2 Tablet(s) Oral at bedtime  sodium chloride 3%  Inhalation 4 milliLiter(s) Inhalation every 12 hours  tamsulosin 0.4 milliGRAM(s) Oral at bedtime      RESPIRATORY:  Mode: AC/ CMV (Assist Control/ Continuous Mandatory Ventilation)  RR (machine): 10  TV (machine): 500  FiO2: 30  PEEP: 6      IMAGING:   Recent imaging studies were reviewed.    LAB RESULTS:                          7.9    7.16  )-----------( 167      ( 28 Aug 2024 04:21 )             24.8           08-28    135  |  101  |  32.7<H>  ----------------------------<  137<H>  4.5   |  25.0  |  1.39<H>    Ca    8.3<L>      28 Aug 2024 04:21  Phos  3.5     08-28  Mg     2.3     08-28

## 2024-08-28 NOTE — PROGRESS NOTE ADULT - ASSESSMENT
89yr man  from [facility]  hx HTN, HLD admitted with basilar occlusion, s/p revascularization complicated by respiratory failure needing vent support      CVA/ Basilar Occlusion  Care per NSICU  neuro checks  on TF    Acute Respiratory Failure  PNA  wean as tolerated  monitor sats, wbc, fevers  sputum culture + Proteus   IV abx  Ertapenem     HARINDER on CKD  avoid nephrotoxic agents    Debility  Assist with care  Turn/reposition  Safety precautions    Encounter for Palliative Care  Palliative Care consulted to assist with GOC  Kyree Elena is the reported HCP. HCP form in chart  Patient DNR - 8/27 MOLST completed  Continuing medical management   Family aware if patient unable to wean from vent decision for trach/peg vs elective vent withdrawal/ comfort measures.

## 2024-08-28 NOTE — CONSULT NOTE ADULT - ASSESSMENT
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 Remains critically ill, intubated, unresponsive    ID consulted for ESBL Proteus in ETT culture     8/25 ETT culture with normal resp carlos   8/26 CXR with atelectasis   BCx neg     Unclear significant of ESBL Proteus in ETT culture, probably colonizer   Minimal O2 requirements on vent, no leukocytosis, no fever (last on 8/25), no radiographic evidence of PNA   Per RN, +secretions   antibiotics were not started until 8/27   would limit to short course ~5days     Please call with questions

## 2024-08-28 NOTE — PROGRESS NOTE ADULT - ASSESSMENT
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  Acute resp failure due to neurologic injury.  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  H/o Stage 3 colon cancer in 2009, had surgery; hemorrhoids, h/o LGIB.    Plan:   - neurochecks q4h  - pain control: tylenol PRN, minimizing sedation  - ASA  - mobility: ROM in bed   - SBP goal 100-180; cont Amlodipine 5mg daily, statin  - maintain Osats>92%, PSV as tolerated, will consider SIMV  - chest PT, Mucomyst/alb nebs  - Barrier to Extubation: Thick secretions q1-2h  - monitor e-lytes, I/Os, Thomas while awaiting BM, flomax 0.4mg  - maintain -180, ISS  - resistant Proteus, ?tracheobronchitis leading to mucus plug. Appreciate ID. Treat for 5 days.  - DVT prophylaxis: SCD, Chemoppx; iron supplements    My full attention was spent providing medically necessary critical care to the patient with details documented in my note above.   Critical care time spent examining patient, reviewing vitals, labs, medications, imaging and discussing with the team goals of care   The combined critical care time provided to the patient was 60 minutes  This time does not include bedside procedures that are documented separately.

## 2024-08-28 NOTE — CONSULT NOTE ADULT - SUBJECTIVE AND OBJECTIVE BOX
[facility]  INFECTIOUS DISEASES at [place] and [place]  ===============================================================                  Seth Robison MD               Diplomates American Board of Internal Medicine & Infectious Diseases                * [place] Office - Appt - Tel  [phone] Fax [phone]                * [place] Office - Appt - Tel [phone] Fax [phone]                                  Hospital Consult line:  [phone]  ==============================================================    RODRIGO LYNN [id number]    Follow up:    I have personally reviewed the labs and data; pertinent labs and data are listed in this note; please see below.     _______________________________________________________________  REVIEW OF SYSTEMS    ________________________________________________________________  Allergies:  Allergy Status Unknown        ________________________________________________________________  PHYSICAL EXAM  GEN: NAD, lying in bed.   HEENT: Anicteric sclerae. Moist mucous membranes. No mucosal lesions.   NECK: Supple. Mid-line trachea. No palpable masses or LN  LUNGS: eupneic. CTA B/L.  HEART: RRR, no m/r/g  ABDOMEN: Soft, NT, ND,no HSM.  +BS.    : No CVA tenderness. No Thomas catheter  EXTREMITIES: well perfused, without  edema.  MSK: No joint swelling or deformity   NEUROLOGIC: Grossly no focal deficits   PSYCHIATRIC: Appropriate affect and mood  SKIN: No rash, wounds or jaundice  LINES: PIV   ________________________________________________________________  Vitals:  T(F): 98.6 (28 Aug 2024 08:00), Max: 99.9 (27 Aug 2024 18:00)  HR: 91 (28 Aug 2024 08:30)  BP: 153/83 (28 Aug 2024 08:00)  RR: 14 (28 Aug 2024 08:00)  SpO2: 100% (28 Aug 2024 08:30) (97% - 100%)  temp max in last 48H T(F): , Max: 99.9 (08-27-24 @ 18:00)    Current Antibiotics:  ertapenem  IVPB 500 milliGRAM(s) IV Intermittent every 24 hours    Other medications:  albuterol/ipratropium for Nebulization 3 milliLiter(s) Nebulizer every 6 hours  amLODIPine   Tablet 5 milliGRAM(s) Oral daily  atorvastatin 20 milliGRAM(s) Oral daily  chlorhexidine 0.12% Liquid 15 milliLiter(s) Oral Mucosa every 12 hours  chlorhexidine 2% Cloths 1 Application(s) Topical daily  ferrous    sulfate Liquid 300 milliGRAM(s) Enteral Tube daily  nystatin Powder 1 Application(s) Topical two times a day  pantoprazole  Injectable 40 milliGRAM(s) IV Push every 12 hours  polyethylene glycol 3350 17 Gram(s) Oral daily  senna 2 Tablet(s) Oral at bedtime  sodium chloride 3%  Inhalation 4 milliLiter(s) Inhalation every 12 hours  tamsulosin 0.4 milliGRAM(s) Oral at bedtime                            7.9    7.16  )-----------( 167      ( 28 Aug 2024 04:21 )             24.8     08-28    135  |  101  |  32.7<H>  ----------------------------<  137<H>  4.5   |  25.0  |  1.39<H>    Ca    8.3<L>      28 Aug 2024 04:21  Phos  3.5     08-28  Mg     2.3     08-28      RECENT CULTURES:  08-25 @ 03:16 ET Tube ET Tube Proteus mirabilis ESBL    Moderate Proteus mirabilis ESBL  Normal Respiratory Lore present    Rare polymorphonuclear leukocytes seen per oil power field  Rare Squamous epithelial cells seen per oil power field  Numerous Gram Positive Rods seen per oil power field  Few Gram Positive Cocci in Clusters seen per oil power field      08-24 @ 21:35 .Blood Blood-Peripheral     No growth at 72 Hours      08-24 @ 21:30 .Blood Blood-Peripheral     No growth at 72 Hours    08-24 @ 21:30 RVP with SARS-CoV-2   NotDetec      08-23 @ 12:10 Catheterized Catheterized     No growth      WBC Count: 7.16 K/uL (08-28-24 @ 04:21)  WBC Count: 8.20 K/uL (08-27-24 @ 03:00)  WBC Count: 8.97 K/uL (08-26-24 @ 02:40)  WBC Count: 10.08 K/uL (08-25-24 @ 03:30)  WBC Count: 9.11 K/uL (08-24-24 @ 21:30)  WBC Count: 10.14 K/uL (08-24-24 @ 02:40)    Creatinine: 1.39 mg/dL (08-28-24 @ 04:21)  Creatinine: 1.49 mg/dL (08-27-24 @ 03:00)  Creatinine: 1.44 mg/dL (08-26-24 @ 02:40)  Creatinine: 1.74 mg/dL (08-25-24 @ 03:30)  Creatinine: 1.86 mg/dL (08-24-24 @ 02:40)      Procalcitonin: 0.14 ng/mL (08-27-24 @ 03:00)  Procalcitonin: 0.11 ng/mL (08-24-24 @ 21:30)     SARS-CoV-2: NotDetec (08-24-24 @ 21:30)    _______________________________________________________________  CARDIOLOGY   ________________________________________________________________  RADIOLOGY   [facility]  INFECTIOUS DISEASES at [place] and [place]  =====================================================       Seth Robison MD                                                        Diplomates American Board of Internal Medicine & Infectious Diseases                * [place] Office - Appt - Tel  [phone] Fax [phone]                * [place] Office - Appt - Tel [phone] Fax [phone]                                  Hospital Consult line:  [phone]  =====================================================      [id number]  RODRIGO LYNN        CC: Patient is a 89y old  Male who presents with a chief complaint of Stroke (28 Aug 2024 08:16)      89y  Male     HPI:  89 Yo M with PMH of HTN, colon cancer, enlarged prostate transfer from [facility] from [facility] for altered mental status. LKW 7PM, Per chart, Patient initially complained of  Nausea,  vomiting and dizziness s/p dinner, later  found to be unresponsive and decorticate posturing in [place] ED. 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 Patient   transferred to [facility] for NeuroIR intervention.     NIHSS performed at20-Aug-2024 22:30 @ [facility]     NIH 1a. Level of Consciousness(3) Responds only with reflex motor or autonomic effects or totally unresponsive, flaccid, and areflex   NIH 1b. LOC Questions(2) Answers neither question correctly   NIH 1c. LOC Commands(2) Performs neither task correctly   NIH 2. Best Gaze(2) Forced deviation, or total gaze paresis not overcome by the oculocephalic maneuver   NIH 3. Visual(3) Bilateral hemianopia (blind including cortical blindness)   NIH 4. Facial Palsy(0) Normal symmetrical movements   NIH 5a. Motor Arm, Left(3) No effort against gravity; limb falls   NIH 5b. Motor Arm, Right(3) No effort against gravity; limb falls   NIH 6a. Motor Leg, Left(3) No effort against gravity; leg falls to bed immediately   NIH 6b. Motor Leg, Right(3) No effort against gravity; leg falls to bed immediately   NIH 7. Limb Ataxia(0) Absent   NIH 8. Sensory(2) Severe to total sensory loss; patient is not aware of being touched in the face, arm, and leg   NIH 9. Best Language(3) Mute, global aphasia; no usable speech or auditory comprehension   NIH 10. Dysarthria(UN) Intubated or other physical barrier   NIH 11. 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 Per son- who is a neurosurgeon, patient has mild memory loss but overall highly functioning. patient is a former smoker. (21 Aug 2024 03:06)    Occupation:  Travel:  Pets:  Drugs:  Alcohol:     FAMILY HISTORY:      ______________________________________________________  Allergies    Allergy Status Unknown    Intolerances        ______________________________________________________  MEDICATIONS:  Antibiotics:  ertapenem  IVPB 500 milliGRAM(s) IV Intermittent every 24 hours    Other medications:  albuterol/ipratropium for Nebulization 3 milliLiter(s) Nebulizer every 6 hours  amLODIPine   Tablet 5 milliGRAM(s) Oral daily  aspirin  chewable 81 milliGRAM(s) Oral daily  atorvastatin 20 milliGRAM(s) Oral daily  chlorhexidine 0.12% Liquid 15 milliLiter(s) Oral Mucosa every 12 hours  chlorhexidine 2% Cloths 1 Application(s) Topical daily  ferrous    sulfate Liquid 300 milliGRAM(s) Enteral Tube daily  heparin   Injectable 5000 Unit(s) SubCutaneous every 8 hours  nystatin Powder 1 Application(s) Topical two times a day  pantoprazole  Injectable 40 milliGRAM(s) IV Push every 12 hours  polyethylene glycol 3350 17 Gram(s) Oral daily  senna 2 Tablet(s) Oral at bedtime  sodium chloride 3%  Inhalation 4 milliLiter(s) Inhalation every 12 hours  tamsulosin 0.4 milliGRAM(s) Oral at bedtime    ______________________________________________________  REVIEW OF SYSTEMS:  CONSTITUTIONAL:  No fever or chills  HEENT:  No diplopia or blurred vision.  No earache, sore throat or runny nose.  CARDIOVASCULAR:  No chest pain  RESPIRATORY:  No cough, shortness of breath  GASTROINTESTINAL:  No nausea, vomiting, abdominal pain or diarrhea.  GENITOURINARY:  No dysuria, frequency or urgency. No blood in urine  MUSCULOSKELETAL:  no joint aches, no muscle pain  SKIN:  No change in skin, hair or nails.  NEUROLOGIC:  No headaches, seizures  PSYCHIATRIC:  No disorder of thought or mood.  ENDOCRINE:  No heat or cold intolerance  HEMATOLOGICAL:  No easy bruising or bleeding.     _____________________________________________________  PHYSICAL EXAM:  GEN: AAOx4. Lying comfortable in bed, in no acute distress   HEENT: normocephalic and atraumatic. EOMI. PERRL.  Anicteric sclerae. Moist mucous membranes. No mucosal lesions. No nasal discharge.   NECK: Supple. No palpable neck masses or LN  LUNGS: eupneic, CTA B/L, no adventitious sounds  HEART: RRR, no m/r/g  ABDOMEN: Soft, NT, ND, no hepatosplenomegally, no palpable masses.  +BS.    : No CVA tenderness, no Thomas catheter  EXTREMITIES: well perfused, without  edema.  MSK: No joint deformity or swelling  LYMPH: no palpable cervical, supraclavicular, axillary or inguinal lymph nodes  NEUROLOGIC: Grossly no motor focal deficits   PSYCHIATRIC: Appropriate affect and mood.  SKIN: No rash, wounds or jaundice  LINES: PIV, no phlebitis     ______________________________________________________      Vitals:  T(F): 98.4 (28 Aug 2024 11:00), Max: 99.9 (27 Aug 2024 18:00)  HR: 70 (28 Aug 2024 11:00)  BP: 132/62 (28 Aug 2024 11:00)  RR: 13 (28 Aug 2024 11:00)  SpO2: 100% (28 Aug 2024 11:00) (97% - 100%)  temp max in last 48H T(F): , Max: 99.9 (08-27-24 @ 18:00)    Current Antibiotics:  ertapenem  IVPB 500 milliGRAM(s) IV Intermittent every 24 hours    Other medications:  albuterol/ipratropium for Nebulization 3 milliLiter(s) Nebulizer every 6 hours  amLODIPine   Tablet 5 milliGRAM(s) Oral daily  aspirin  chewable 81 milliGRAM(s) Oral daily  atorvastatin 20 milliGRAM(s) Oral daily  chlorhexidine 0.12% Liquid 15 milliLiter(s) Oral Mucosa every 12 hours  chlorhexidine 2% Cloths 1 Application(s) Topical daily  ferrous    sulfate Liquid 300 milliGRAM(s) Enteral Tube daily  heparin   Injectable 5000 Unit(s) SubCutaneous every 8 hours  nystatin Powder 1 Application(s) Topical two times a day  pantoprazole  Injectable 40 milliGRAM(s) IV Push every 12 hours  polyethylene glycol 3350 17 Gram(s) Oral daily  senna 2 Tablet(s) Oral at bedtime  sodium chloride 3%  Inhalation 4 milliLiter(s) Inhalation every 12 hours  tamsulosin 0.4 milliGRAM(s) Oral at bedtime                            7.9    7.16  )-----------( 167      ( 28 Aug 2024 04:21 )             24.8     08-28    135  |  101  |  32.7<H>  ----------------------------<  137<H>  4.5   |  25.0  |  1.39<H>    Ca    8.3<L>      28 Aug 2024 04:21  Phos  3.5     08-28  Mg     2.3     08-28      RECENT CULTURES:  08-25 @ 03:16 ET Tube ET Tube Proteus mirabilis ESBL    Moderate Proteus mirabilis ESBL  Normal Respiratory Lore present    Rare polymorphonuclear leukocytes seen per oil power field  Rare Squamous epithelial cells seen per oil power field  Numerous Gram Positive Rods seen per oil power field  Few Gram Positive Cocci in Clusters seen per oil power field      08-24 @ 21:35 .Blood Blood-Peripheral     No growth at 72 Hours        08-24 @ 21:30 .Blood Blood-Peripheral     No growth at 72 Hours      [facility]  08-23 @ 12:10 Catheterized Catheterized     No growth            WBC Count: 7.16 K/uL (08-28-24 @ 04:21)  WBC Count: 8.20 K/uL (08-27-24 @ 03:00)  WBC Count: 8.97 K/uL (08-26-24 @ 02:40)  WBC Count: 10.08 K/uL (08-25-24 @ 03:30)  WBC Count: 9.11 K/uL (08-24-24 @ 21:30)  WBC Count: 10.14 K/uL (08-24-24 @ 02:40)    Creatinine: 1.39 mg/dL (08-28-24 @ 04:21)  Creatinine: 1.49 mg/dL (08-27-24 @ 03:00)  Creatinine: 1.44 mg/dL (08-26-24 @ 02:40)  Creatinine: 1.74 mg/dL (08-25-24 @ 03:30)  Creatinine: 1.86 mg/dL (08-24-24 @ 02:40)    Procalcitonin: 0.14 ng/mL (08-27-24 @ 03:00)  Procalcitonin: 0.11 ng/mL (08-24-24 @ 21:30)     SARS-CoV-2: NotDetec (08-24-24 @ 21:30)    ______________________________________________________  CARDIOLOGY    ______________________________________________________  RADIOLOGY   [facility]  INFECTIOUS DISEASES at [place] and [place]  =====================================================       Seth Robison MD                                                        Diplomates American Board of Internal Medicine & Infectious Diseases                * [place] Office - Appt - Tel  [phone] Fax [phone]                * [place] Office - Appt - Tel [phone] Fax [phone]                                  Hospital Consult line:  [phone]  =====================================================      [id number]  RODRIGO LYNN        CC: Patient is a 89y old  Male who presents with a chief complaint of Stroke (28 Aug 2024 08:16)      HPI: 89 Yo M with PMH of HTN, colon cancer, enlarged prostate transfer from [facility] from [facility] for altered mental status. LKW 7PM, Per chart, Patient initially complained of  Nausea,  vomiting and dizziness s/p dinner, later  found to be unresponsive and decorticate posturing in [place] ED. 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 Patient   transferred to [facility] for NeuroIR intervention.  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 patient is a former smoker. (21 Aug 2024 03:06)      FAMILY HISTORY:  Unable to obtain due to medical condition - intubated, stroke     ______________________________________________________  Allergies    Allergy Status Unknown    Intolerances      ______________________________________________________  REVIEW OF SYSTEMS:  Unable to obtain due to medical condition - stroke, intubated     _____________________________________________________  PHYSICAL EXAM:  GEN: critically ill, intubated   HEENT: normocephalic and atraumatic. Anicteric sclerae. ETT and NGT in place   NECK: Supple.  LUNGS: on vent, symmetric air entry. CTA anteriorly. A/C FiO2 30 PEEP 6   HEART: RRR, no m/r/g  ABDOMEN: Soft, NT, ND, no HSM, no palpable masses.  +BS.    : condom catheter  EXTREMITIES: , without  edema.  MSK: No joint deformity or swelling  LYMPH: no palpable cervical, supraclavicular lymph nodes  NEUROLOGIC: unresponsive, on soft restrains   SKIN: ecchymoses at venipuncture sites   LINES: PIV, no phlebitis     ______________________________________________________      Vitals:  T(F): 98.4 (28 Aug 2024 11:00), Max: 99.9 (27 Aug 2024 18:00)  HR: 70 (28 Aug 2024 11:00)  BP: 132/62 (28 Aug 2024 11:00)  RR: 13 (28 Aug 2024 11:00)  SpO2: 100% (28 Aug 2024 11:00) (97% - 100%)  temp max in last 48H T(F): , Max: 99.9 (08-27-24 @ 18:00)    Current Antibiotics:  ertapenem  IVPB 500 milliGRAM(s) IV Intermittent every 24 hours    Other medications:  albuterol/ipratropium for Nebulization 3 milliLiter(s) Nebulizer every 6 hours  amLODIPine   Tablet 5 milliGRAM(s) Oral daily  aspirin  chewable 81 milliGRAM(s) Oral daily  atorvastatin 20 milliGRAM(s) Oral daily  chlorhexidine 0.12% Liquid 15 milliLiter(s) Oral Mucosa every 12 hours  chlorhexidine 2% Cloths 1 Application(s) Topical daily  ferrous    sulfate Liquid 300 milliGRAM(s) Enteral Tube daily  heparin   Injectable 5000 Unit(s) SubCutaneous every 8 hours  nystatin Powder 1 Application(s) Topical two times a day  pantoprazole  Injectable 40 milliGRAM(s) IV Push every 12 hours  polyethylene glycol 3350 17 Gram(s) Oral daily  senna 2 Tablet(s) Oral at bedtime  sodium chloride 3%  Inhalation 4 milliLiter(s) Inhalation every 12 hours  tamsulosin 0.4 milliGRAM(s) Oral at bedtime                            7.9    7.16  )-----------( 167      ( 28 Aug 2024 04:21 )             24.8     08-28    135  |  101  |  32.7<H>  ----------------------------<  137<H>  4.5   |  25.0  |  1.39<H>    Ca    8.3<L>      28 Aug 2024 04:21  Phos  3.5     08-28  Mg     2.3     08-28      RECENT CULTURES:  08-25 @ 03:16 ET Tube ET Tube Proteus mirabilis ESBL    Moderate Proteus mirabilis ESBL  Normal Respiratory Lore present    - Ampicillin: R >16 These ampicillin results predict results for amoxicillin  - Ampicillin/Sulbactam: R <=4/2  - Aztreonam: R >16  - Cefazolin: R >16  - Cefepime: R >16  - Ceftriaxone: R >32  - Ciprofloxacin: R >2  - Ertapenem: S <=0.5  - Gentamicin: S <=2  - Levofloxacin: R 2  - Meropenem: S <=1  - Piperacillin/Tazobactam: R <=8  - Tobramycin: S <=2  - Trimethoprim/Sulfamethoxazole: S 1/19    Rare polymorphonuclear leukocytes seen per oil power field  Rare Squamous epithelial cells seen per oil power field  Numerous Gram Positive Rods seen per oil power field  Few Gram Positive Cocci in Clusters seen per oil power field      08-24 @ 21:35 .Blood Blood-Peripheral     No growth at 72 Hours        08-24 @ 21:30 .Blood Blood-Peripheral     No growth at 72 Hours        08-23 @ 12:10 Catheterized Catheterized     No growth      WBC Count: 7.16 K/uL (08-28-24 @ 04:21)  WBC Count: 8.20 K/uL (08-27-24 @ 03:00)  WBC Count: 8.97 K/uL (08-26-24 @ 02:40)  WBC Count: 10.08 K/uL (08-25-24 @ 03:30)  WBC Count: 9.11 K/uL (08-24-24 @ 21:30)  WBC Count: 10.14 K/uL (08-24-24 @ 02:40)    Creatinine: 1.39 mg/dL (08-28-24 @ 04:21)  Creatinine: 1.49 mg/dL (08-27-24 @ 03:00)  Creatinine: 1.44 mg/dL (08-26-24 @ 02:40)  Creatinine: 1.74 mg/dL (08-25-24 @ 03:30)  Creatinine: 1.86 mg/dL (08-24-24 @ 02:40)    Procalcitonin: 0.14 ng/mL (08-27-24 @ 03:00)  Procalcitonin: 0.11 ng/mL (08-24-24 @ 21:30)     SARS-CoV-2: NotDetec (08-24-24 @ 21:30)    ______________________________________________________  CARDIOLOGY    ______________________________________________________  RADIOLOGY  < from: CT Head No Cont (08.27.24 @ 08:52) >  FINDINGS:  Stable pontine infarct. Stable bilateral cerebellar hemisphere   infarctions. Previously seen hyperdensity in the mikaela has resolved.    There is periventricular and subcortical white matter hypodensity without   mass effect, nonspecific, likely representing mild chronic microvascular   ischemic changes. There is no evidence of mass, mass effect, midline   shift or extra-axial fluid collection. The lateral ventricles and   cortical sulci are age-appropriate in size and configuration.    Right-sided approach NG tube. Small right mastoid air cell effusion. Mild   inflammatory mucosal changes are seen throughout the various portions of   the paranasal sinuses. The patient is status post bilateral ocular lens   replacement surgery. The orbits and left mastoid air cells are   unremarkable. The calvarium is intact. Consider MRI as clinically   warranted.    IMPRESSION: Stable pontine infarct. Stable bilateral cerebellar   hemisphere infarctions. Previously seen hyperdensity in the mikaela has   resolved.    < end of copied text >

## 2024-08-29 LAB
ANION GAP SERPL CALC-SCNC: 9 MMOL/L — SIGNIFICANT CHANGE UP (ref 5–17)
BUN SERPL-MCNC: 34.3 MG/DL — HIGH (ref 8–20)
CALCIUM SERPL-MCNC: 8 MG/DL — LOW (ref 8.4–10.5)
CHLORIDE SERPL-SCNC: 101 MMOL/L — SIGNIFICANT CHANGE UP (ref 96–108)
CO2 SERPL-SCNC: 25 MMOL/L — SIGNIFICANT CHANGE UP (ref 22–29)
CREAT SERPL-MCNC: 1.34 MG/DL — HIGH (ref 0.5–1.3)
EGFR: 50 ML/MIN/1.73M2 — LOW
GLUCOSE SERPL-MCNC: 136 MG/DL — HIGH (ref 70–99)
HCT VFR BLD CALC: 23.8 % — LOW (ref 39–50)
HGB BLD-MCNC: 7.6 G/DL — LOW (ref 13–17)
MAGNESIUM SERPL-MCNC: 2.3 MG/DL — SIGNIFICANT CHANGE UP (ref 1.6–2.6)
MCHC RBC-ENTMCNC: 27.3 PG — SIGNIFICANT CHANGE UP (ref 27–34)
MCHC RBC-ENTMCNC: 31.9 GM/DL — LOW (ref 32–36)
MCV RBC AUTO: 85.6 FL — SIGNIFICANT CHANGE UP (ref 80–100)
PHOSPHATE SERPL-MCNC: 3 MG/DL — SIGNIFICANT CHANGE UP (ref 2.4–4.7)
PLATELET # BLD AUTO: 192 K/UL — SIGNIFICANT CHANGE UP (ref 150–400)
POTASSIUM SERPL-MCNC: 4.8 MMOL/L — SIGNIFICANT CHANGE UP (ref 3.5–5.3)
POTASSIUM SERPL-SCNC: 4.8 MMOL/L — SIGNIFICANT CHANGE UP (ref 3.5–5.3)
RBC # BLD: 2.78 M/UL — LOW (ref 4.2–5.8)
RBC # FLD: 14.6 % — HIGH (ref 10.3–14.5)
SODIUM SERPL-SCNC: 135 MMOL/L — SIGNIFICANT CHANGE UP (ref 135–145)
WBC # BLD: 6.86 K/UL — SIGNIFICANT CHANGE UP (ref 3.8–10.5)
WBC # FLD AUTO: 6.86 K/UL — SIGNIFICANT CHANGE UP (ref 3.8–10.5)

## 2024-08-29 PROCEDURE — 99291 CRITICAL CARE FIRST HOUR: CPT

## 2024-08-29 PROCEDURE — 71045 X-RAY EXAM CHEST 1 VIEW: CPT | Mod: 26

## 2024-08-29 PROCEDURE — 99233 SBSQ HOSP IP/OBS HIGH 50: CPT

## 2024-08-29 RX ORDER — ACETAMINOPHEN 325 MG/1
1000 TABLET ORAL ONCE
Refills: 0 | Status: COMPLETED | OUTPATIENT
Start: 2024-08-29 | End: 2024-08-29

## 2024-08-29 RX ADMIN — CHLORHEXIDINE GLUCONATE 15 MILLILITER(S): 40 SOLUTION TOPICAL at 06:32

## 2024-08-29 RX ADMIN — Medication 1 APPLICATION(S): at 17:18

## 2024-08-29 RX ADMIN — Medication 5000 UNIT(S): at 21:39

## 2024-08-29 RX ADMIN — AMLODIPINE BESYLATE 5 MILLIGRAM(S): 10 TABLET ORAL at 06:32

## 2024-08-29 RX ADMIN — Medication 40 MILLIGRAM(S): at 06:32

## 2024-08-29 RX ADMIN — POLYETHYLENE GLYCOL 3350 17 GRAM(S): 17 POWDER, FOR SOLUTION ORAL at 12:07

## 2024-08-29 RX ADMIN — IPRATROPIUM BROMIDE AND ALBUTEROL SULFATE 3 MILLILITER(S): .5; 3 SOLUTION RESPIRATORY (INHALATION) at 20:05

## 2024-08-29 RX ADMIN — ERTAPENEM SODIUM 120 MILLIGRAM(S): 1 INJECTION, POWDER, LYOPHILIZED, FOR SOLUTION INTRAMUSCULAR; INTRAVENOUS at 17:28

## 2024-08-29 RX ADMIN — ACETAMINOPHEN 400 MILLIGRAM(S): 325 TABLET ORAL at 21:40

## 2024-08-29 RX ADMIN — SODIUM CHLORIDE 4 MILLILITER(S): 9 INJECTION INTRAMUSCULAR; INTRAVENOUS; SUBCUTANEOUS at 07:16

## 2024-08-29 RX ADMIN — Medication 1 APPLICATION(S): at 06:32

## 2024-08-29 RX ADMIN — IPRATROPIUM BROMIDE AND ALBUTEROL SULFATE 3 MILLILITER(S): .5; 3 SOLUTION RESPIRATORY (INHALATION) at 14:10

## 2024-08-29 RX ADMIN — Medication 2 TABLET(S): at 21:39

## 2024-08-29 RX ADMIN — IPRATROPIUM BROMIDE AND ALBUTEROL SULFATE 3 MILLILITER(S): .5; 3 SOLUTION RESPIRATORY (INHALATION) at 03:21

## 2024-08-29 RX ADMIN — TAMSULOSIN HYDROCHLORIDE 0.4 MILLIGRAM(S): 0.4 CAPSULE ORAL at 21:39

## 2024-08-29 RX ADMIN — Medication 300 MILLIGRAM(S): at 12:07

## 2024-08-29 RX ADMIN — CHLORHEXIDINE GLUCONATE 1 APPLICATION(S): 40 SOLUTION TOPICAL at 12:07

## 2024-08-29 RX ADMIN — Medication 5000 UNIT(S): at 14:27

## 2024-08-29 RX ADMIN — SODIUM CHLORIDE 4 MILLILITER(S): 9 INJECTION INTRAMUSCULAR; INTRAVENOUS; SUBCUTANEOUS at 20:05

## 2024-08-29 RX ADMIN — ACETAMINOPHEN 1000 MILLIGRAM(S): 325 TABLET ORAL at 22:00

## 2024-08-29 RX ADMIN — IPRATROPIUM BROMIDE AND ALBUTEROL SULFATE 3 MILLILITER(S): .5; 3 SOLUTION RESPIRATORY (INHALATION) at 07:16

## 2024-08-29 RX ADMIN — Medication 5000 UNIT(S): at 06:32

## 2024-08-29 RX ADMIN — Medication 81 MILLIGRAM(S): at 12:07

## 2024-08-29 RX ADMIN — CHLORHEXIDINE GLUCONATE 15 MILLILITER(S): 40 SOLUTION TOPICAL at 17:17

## 2024-08-29 RX ADMIN — Medication 40 MILLIGRAM(S): at 17:17

## 2024-08-29 RX ADMIN — Medication 20 MILLIGRAM(S): at 12:07

## 2024-08-29 NOTE — PROGRESS NOTE ADULT - ASSESSMENT
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  Acute resp failure due to neurologic injury.  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  H/o Stage 3 colon cancer in 2009, had surgery; hemorrhoids, h/o LGIB.    Plan:   - neurochecks q4h  - pain control: tylenol PRN, minimizing sedation  - ASA  - mobility: ROM in bed   - SBP goal 100-180; cont Amlodipine 5mg daily, statin  - maintain Osats>92%, PSV as tolerated, consider SIMV if apraxic episodes  - chest PT, Mucomyst/alb nebs  - Barrier to Extubation: Thick secretions q1-2h  - monitor e-lytes, I/Os, Thomas while awaiting BM, flomax 0.4mg  - maintain -180, ISS  - resistant Proteus, ?tracheobronchitis leading to mucus plug. Appreciate ID. Treat for 5 days.  - DVT prophylaxis: SCD, Chemoppx; iron supplements    My full attention was spent providing medically necessary critical care to the patient with details documented in my note above.   Critical care time spent examining patient, reviewing vitals, labs, medications, imaging and discussing with the team goals of care   The combined critical care time provided to the patient was 60 minutes  This time does not include bedside procedures that are documented separately.

## 2024-08-29 NOTE — PROGRESS NOTE ADULT - SUBJECTIVE AND OBJECTIVE BOX
NSICU ATTENDING PROGRESS NOTE    HPI:  89 Yo M with PMH of HTN, colon cancer, enlarged prostate transfer from [facility] from [facility] for altered mental status. LKW 7PM, Per chart, Patient initially complained of  Nausea,  vomiting and dizziness s/p dinner, later  found to be unresponsive and decorticate posturing in [place] ED. 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 Patient   transferred to [facility] for NeuroIR intervention.   l (21 Aug 2024 03:06)    24h events:  8/24 - febrile overnight -> fever work-up sent  8/25 - following commands, developed bloody in-line secretions; family discussions - prefer to minimize sedation  8/26 - Tolerating PSV with occasional failed breath triggering, possible mild breathing apraxia     Exam:  +EO, no FC, PERRL 2 mm BL, +tracking, + cough  showed 2 fingers/thumbs up on LUE  pupils reactive, difficulty abducting L eye, face symmetric, does not protrude tongue  motor - moves L side spont in plane of bed, RUE weak wdrl, RLE wdrl.  regular rate, intermittent afib  normal WOB on PSV  Abd soft, NT, ND,   Thomas with yellow urine  warm/dry, No peripheral swelling    ------------------------------------------------------------------------------------------------------  ICU Vital Signs Last 24 Hrs  T(C): 36.8 (29 Aug 2024 13:00), Max: 37.5 (29 Aug 2024 04:00)  T(F): 98.2 (29 Aug 2024 13:00), Max: 99.5 (29 Aug 2024 04:00)  HR: 78 (29 Aug 2024 13:00) (70 - 95)  BP: 127/63 (29 Aug 2024 13:00) (119/60 - 144/65)  BP(mean): 82 (29 Aug 2024 13:00) (72 - 88)  ABP: --  ABP(mean): --  RR: 15 (29 Aug 2024 13:00) (12 - 21)  SpO2: 100% (29 Aug 2024 13:00) (98% - 100%)    O2 Parameters below as of 29 Aug 2024 12:00  Patient On (Oxygen Delivery Method): ventilator, CPAP    O2 Concentration (%): 30        I&O's Summary    28 Aug 2024 07:01  -  29 Aug 2024 07:00  --------------------------------------------------------  IN: 1640 mL / OUT: 1500 mL / NET: 140 mL    29 Aug 2024 07:01  -  29 Aug 2024 14:29  --------------------------------------------------------  IN: 515 mL / OUT: 0 mL / NET: 515 mL        MEDICATIONS  (STANDING):  albuterol/ipratropium for Nebulization 3 milliLiter(s) Nebulizer every 6 hours  amLODIPine   Tablet 5 milliGRAM(s) Oral daily  aspirin  chewable 81 milliGRAM(s) Oral daily  atorvastatin 20 milliGRAM(s) Oral daily  chlorhexidine 0.12% Liquid 15 milliLiter(s) Oral Mucosa every 12 hours  chlorhexidine 2% Cloths 1 Application(s) Topical daily  ertapenem  IVPB 1000 milliGRAM(s) IV Intermittent every 24 hours  ferrous    sulfate Liquid 300 milliGRAM(s) Enteral Tube daily  heparin   Injectable 5000 Unit(s) SubCutaneous every 8 hours  nystatin Powder 1 Application(s) Topical two times a day  pantoprazole  Injectable 40 milliGRAM(s) IV Push every 12 hours  polyethylene glycol 3350 17 Gram(s) Oral daily  senna 2 Tablet(s) Oral at bedtime  sodium chloride 3%  Inhalation 4 milliLiter(s) Inhalation every 12 hours  tamsulosin 0.4 milliGRAM(s) Oral at bedtime      RESPIRATORY:  Mode: CPAP with PS  FiO2: 30  PEEP: 6  PS: 7  MAP: 10  PIP: 14      IMAGING:   Recent imaging studies were reviewed.    LAB RESULTS:                          7.6    6.86  )-----------( 192      ( 29 Aug 2024 02:39 )             23.8           08-29    135  |  101  |  34.3<H>  ----------------------------<  136<H>  4.8   |  25.0  |  1.34<H>    Ca    8.0<L>      29 Aug 2024 02:39  Phos  3.0     08-29  Mg     2.3     08-29

## 2024-08-29 NOTE — PROGRESS NOTE ADULT - SUBJECTIVE AND OBJECTIVE BOX
CC:  Follow up GOC , Symptoms    OVERNIGHT EVENTS:  reported on CPAP most of the day    Present Symptoms:   Dyspnea:  No on vent  Nausea/Vomiting:  No    Anxiety/ Agitation No    Depression:   Unable to assess  Fatigue:    Unable  to assess  Loss of appetite:   NA   Constipation:   Yes    Pain:  No Signs            Location            Duration            Character            Severity            Factors            Effect    Pain AD Score:  [email or web address] (press ctrl + left click to view)    Review of Systems: Reviewed as above  Further ROS unable limited to  obtain due to poor mentation - intubated        MEDICATIONS  (STANDING):  albuterol/ipratropium for Nebulization 3 milliLiter(s) Nebulizer every 6 hours  amLODIPine   Tablet 5 milliGRAM(s) Oral daily  aspirin  chewable 81 milliGRAM(s) Oral daily  atorvastatin 20 milliGRAM(s) Oral daily  chlorhexidine 0.12% Liquid 15 milliLiter(s) Oral Mucosa every 12 hours  chlorhexidine 2% Cloths 1 Application(s) Topical daily  ertapenem  IVPB 1000 milliGRAM(s) IV Intermittent every 24 hours  ferrous    sulfate Liquid 300 milliGRAM(s) Enteral Tube daily  heparin   Injectable 5000 Unit(s) SubCutaneous every 8 hours  nystatin Powder 1 Application(s) Topical two times a day  pantoprazole  Injectable 40 milliGRAM(s) IV Push every 12 hours  polyethylene glycol 3350 17 Gram(s) Oral daily  senna 2 Tablet(s) Oral at bedtime  sodium chloride 3%  Inhalation 4 milliLiter(s) Inhalation every 12 hours  tamsulosin 0.4 milliGRAM(s) Oral at bedtime    MEDICATIONS  (PRN):  acetylcysteine 20%  Inhalation 4 milliLiter(s) Inhalation every 6 hours PRN Thick secretions  bisacodyl Suppository 10 milliGRAM(s) Rectal daily PRN Constipation  hydrALAZINE Injectable 10 milliGRAM(s) IV Push every 2 hours PRN SBP>160  magnesium hydroxide Suspension 30 milliLiter(s) Oral daily PRN Constipation      PHYSICAL EXAM:    Vital Signs Last 24 Hrs  T(C): 36.8 (29 Aug 2024 13:00), Max: 37.5 (29 Aug 2024 04:00)  T(F): 98.2 (29 Aug 2024 13:00), Max: 99.5 (29 Aug 2024 04:00)  HR: 74 (29 Aug 2024 15:20) (70 - 95)  BP: 127/63 (29 Aug 2024 13:00) (119/60 - 144/65)  BP(mean): 82 (29 Aug 2024 13:00) (72 - 88)  RR: 15 (29 Aug 2024 13:00) (12 - 21)  SpO2: 99% (29 Aug 2024 15:20) (98% - 100%)    Parameters below as of 29 Aug 2024 14:21  Patient On (Oxygen Delivery Method): ventilator    Karnofsky: 10 %  General:  Elderly man - intubated       HEENT:  NCAT   ( x )  ET tube     Lungs: comfortable  non labored  CV:  RR  GI:  soft NTND  MSK: bb  Skin:  warm/dry  Neuro responsive to tactile stimuli  Psych  calm    LABS:                          7.6    6.86  )-----------( 192      ( 29 Aug 2024 02:39 )             23.8     08-29    135  |  101  |  34.3<H>  ----------------------------<  136<H>  4.8   |  25.0  |  1.34<H>    Ca    8.0<L>      29 Aug 2024 02:39  Phos  3.0     08-29  Mg     2.3     08-29        Urinalysis Basic - ( 29 Aug 2024 02:39 )    Color: x / Appearance: x / SG: x / pH: x  Gluc: 136 mg/dL / Ketone: x  / Bili: x / Urobili: x   Blood: x / Protein: x / Nitrite: x   Leuk Esterase: x / RBC: x / WBC x   Sq Epi: x / Non Sq Epi: x / Bacteria: x      I&O's Summary    28 Aug 2024 07:01  -  29 Aug 2024 07:00  --------------------------------------------------------  IN: 1640 mL / OUT: 1500 mL / NET: 140 mL    29 Aug 2024 07:01  -  29 Aug 2024 15:54  --------------------------------------------------------  IN: 515 mL / OUT: 900 mL / NET: -385 mL        RADIOLOGY & ADDITIONAL STUDIES:  Imaging Reviewed  ( x  )   < from: TTE W or WO Ultrasound Enhancing Agent (08.21.24 @ 12:49) >      1. Left ventricular cavity is normal in size. Left ventricular systolic function is moderately decreased with an ejection fraction of 37 % by Kern's method of disks.   2. Multiple segmental abnormalitiesexist. See findings.   3. The left ventricular diastolic function is indeterminate.   4. Normal right ventricular cavity size and normal right ventricular systolic function.   5. Left atrium is mildly dilated.   6. The right atrium is normal in size.   7. Moderate mitral regurgitation.   8. Structurally normal mitral valve with normal leaflet excursion.   9. Mild tricuspid regurgitation.  10. Estimated pulmonary artery systolic pressure is 34 mmHg.  11. Lipomatous interatrial septal hypertrophy present.  12. Mild left ventricular hypertrophy.  13. There is mild calcification of the mitral valve annulus.  14. There is no evidence of a left ventricular thrombus.    < end of copied text >        ADVANCE DIRECTIVE PREFERENCES    DNR continuing medical treatments

## 2024-08-29 NOTE — PROGRESS NOTE ADULT - ASSESSMENT
89yr man  from [facility]  hx HTN, HLD admitted with basilar occlusion, s/p revascularization complicated by respiratory failure needing vent support    CVA/ Basilar Occlusion  Care per NSICU  neuro checks  on TF    Acute Respiratory Failure  PNA  wean as tolerated  monitor sats, wbc, fevers  sputum culture + Proteus   IV abx  Ertapenem     HARINDER on CKD  avoid nephrotoxic agents    Debility  Assist with care  Turn/reposition  Safety precautions    Encounter for Palliative Care  Palliative Care consulted to assist with GOC  Kyree Elena is the reported HCP. HCP form in chart  Patient DNR - 8/27 MOLST completed    Patient reported tolerating CPAP today.    Discussed with NSICU -if patient is able to be medically extubated,  would need directives prior to extubation about Reintubation given patient's ability to maintain airway.    PC to continue to follow hospital course

## 2024-08-30 LAB
ANION GAP SERPL CALC-SCNC: 12 MMOL/L — SIGNIFICANT CHANGE UP (ref 5–17)
BUN SERPL-MCNC: 36.8 MG/DL — HIGH (ref 8–20)
CALCIUM SERPL-MCNC: 8 MG/DL — LOW (ref 8.4–10.5)
CHLORIDE SERPL-SCNC: 101 MMOL/L — SIGNIFICANT CHANGE UP (ref 96–108)
CO2 SERPL-SCNC: 25 MMOL/L — SIGNIFICANT CHANGE UP (ref 22–29)
CREAT SERPL-MCNC: 1.4 MG/DL — HIGH (ref 0.5–1.3)
CULTURE RESULTS: SIGNIFICANT CHANGE UP
CULTURE RESULTS: SIGNIFICANT CHANGE UP
EGFR: 47 ML/MIN/1.73M2 — LOW
GLUCOSE SERPL-MCNC: 137 MG/DL — HIGH (ref 70–99)
HCT VFR BLD CALC: 26 % — LOW (ref 39–50)
HGB BLD-MCNC: 8.1 G/DL — LOW (ref 13–17)
MAGNESIUM SERPL-MCNC: 2.3 MG/DL — SIGNIFICANT CHANGE UP (ref 1.6–2.6)
MCHC RBC-ENTMCNC: 27.1 PG — SIGNIFICANT CHANGE UP (ref 27–34)
MCHC RBC-ENTMCNC: 31.2 GM/DL — LOW (ref 32–36)
MCV RBC AUTO: 87 FL — SIGNIFICANT CHANGE UP (ref 80–100)
PHOSPHATE SERPL-MCNC: 3.7 MG/DL — SIGNIFICANT CHANGE UP (ref 2.4–4.7)
PLATELET # BLD AUTO: 228 K/UL — SIGNIFICANT CHANGE UP (ref 150–400)
POTASSIUM SERPL-MCNC: 4.8 MMOL/L — SIGNIFICANT CHANGE UP (ref 3.5–5.3)
POTASSIUM SERPL-SCNC: 4.8 MMOL/L — SIGNIFICANT CHANGE UP (ref 3.5–5.3)
RBC # BLD: 2.99 M/UL — LOW (ref 4.2–5.8)
RBC # FLD: 14.8 % — HIGH (ref 10.3–14.5)
SODIUM SERPL-SCNC: 138 MMOL/L — SIGNIFICANT CHANGE UP (ref 135–145)
SPECIMEN SOURCE: SIGNIFICANT CHANGE UP
SPECIMEN SOURCE: SIGNIFICANT CHANGE UP
WBC # BLD: 9.91 K/UL — SIGNIFICANT CHANGE UP (ref 3.8–10.5)
WBC # FLD AUTO: 9.91 K/UL — SIGNIFICANT CHANGE UP (ref 3.8–10.5)

## 2024-08-30 PROCEDURE — 99232 SBSQ HOSP IP/OBS MODERATE 35: CPT

## 2024-08-30 PROCEDURE — 93010 ELECTROCARDIOGRAM REPORT: CPT

## 2024-08-30 PROCEDURE — 99291 CRITICAL CARE FIRST HOUR: CPT

## 2024-08-30 RX ADMIN — Medication 1 APPLICATION(S): at 17:26

## 2024-08-30 RX ADMIN — POLYETHYLENE GLYCOL 3350 17 GRAM(S): 17 POWDER, FOR SOLUTION ORAL at 12:28

## 2024-08-30 RX ADMIN — Medication 40 MILLIGRAM(S): at 17:26

## 2024-08-30 RX ADMIN — CHLORHEXIDINE GLUCONATE 15 MILLILITER(S): 40 SOLUTION TOPICAL at 05:28

## 2024-08-30 RX ADMIN — IPRATROPIUM BROMIDE AND ALBUTEROL SULFATE 3 MILLILITER(S): .5; 3 SOLUTION RESPIRATORY (INHALATION) at 08:09

## 2024-08-30 RX ADMIN — Medication 300 MILLIGRAM(S): at 12:27

## 2024-08-30 RX ADMIN — IPRATROPIUM BROMIDE AND ALBUTEROL SULFATE 3 MILLILITER(S): .5; 3 SOLUTION RESPIRATORY (INHALATION) at 04:12

## 2024-08-30 RX ADMIN — CHLORHEXIDINE GLUCONATE 1 APPLICATION(S): 40 SOLUTION TOPICAL at 12:24

## 2024-08-30 RX ADMIN — AMLODIPINE BESYLATE 5 MILLIGRAM(S): 10 TABLET ORAL at 05:27

## 2024-08-30 RX ADMIN — IPRATROPIUM BROMIDE AND ALBUTEROL SULFATE 3 MILLILITER(S): .5; 3 SOLUTION RESPIRATORY (INHALATION) at 19:46

## 2024-08-30 RX ADMIN — ERTAPENEM SODIUM 120 MILLIGRAM(S): 1 INJECTION, POWDER, LYOPHILIZED, FOR SOLUTION INTRAMUSCULAR; INTRAVENOUS at 17:26

## 2024-08-30 RX ADMIN — Medication 5000 UNIT(S): at 22:44

## 2024-08-30 RX ADMIN — Medication 81 MILLIGRAM(S): at 12:28

## 2024-08-30 RX ADMIN — Medication 5000 UNIT(S): at 15:00

## 2024-08-30 RX ADMIN — Medication 20 MILLIGRAM(S): at 12:28

## 2024-08-30 RX ADMIN — IPRATROPIUM BROMIDE AND ALBUTEROL SULFATE 3 MILLILITER(S): .5; 3 SOLUTION RESPIRATORY (INHALATION) at 15:52

## 2024-08-30 RX ADMIN — Medication 40 MILLIGRAM(S): at 05:27

## 2024-08-30 RX ADMIN — Medication 1 APPLICATION(S): at 05:28

## 2024-08-30 RX ADMIN — Medication 5000 UNIT(S): at 05:27

## 2024-08-30 NOTE — PROGRESS NOTE ADULT - SUBJECTIVE AND OBJECTIVE BOX
NSICU ATTENDING PROGRESS NOTE    HPI:  89 Yo M with PMH of HTN, colon cancer, enlarged prostate transfer from [facility] from [facility] for altered mental status. LKW 7PM, Per chart, Patient initially complained of  Nausea,  vomiting and dizziness s/p dinner, later  found to be unresponsive and decorticate posturing in [place] ED. 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 Patient   transferred to [facility] for NeuroIR intervention.   l (21 Aug 2024 03:06)    24h events:  8/24 - febrile overnight -> fever work-up sent  8/25 - following commands, developed bloody in-line secretions; family discussions - prefer to minimize sedation  8/26 - Tolerating PSV with occasional failed breath triggering, possible mild breathing apraxia  8/30 - Discussed extubation and GOC with family. Discussed that patient lung mechanics had improved but patient suboptimal for medical extubation given secretion burden. Family expressed understanding but wished to continue with extubation to DNR/DNI with possibility of CMO if extubation failure. Extubated to NC with intermittent hypoxic episodes to ~70-80s immediately post-extubation. Started HFNC with improvement.    Exam:  +EO, no FC, PERRL 2 mm BL, +tracking, + cough  showed 2 fingers/thumbs up on LUE  pupils reactive, difficulty abducting L eye, face symmetric, does not protrude tongue  motor - moves L side spont in plane of bed, RUE weak wdrl, RLE wdrl.  regular rate, intermittent afib  normal WOB on HFNC  Abd soft, NT, ND,   Thomas with yellow urine  warm/dry, No peripheral swelling    ------------------------------------------------------------------------------------------------------  ICU Vital Signs Last 24 Hrs  T(C): 36.8 (29 Aug 2024 13:00), Max: 37.5 (29 Aug 2024 04:00)  T(F): 98.2 (29 Aug 2024 13:00), Max: 99.5 (29 Aug 2024 04:00)  HR: 78 (29 Aug 2024 13:00) (70 - 95)  BP: 127/63 (29 Aug 2024 13:00) (119/60 - 144/65)  BP(mean): 82 (29 Aug 2024 13:00) (72 - 88)  ABP: --  ABP(mean): --  RR: 15 (29 Aug 2024 13:00) (12 - 21)  SpO2: 100% (29 Aug 2024 13:00) (98% - 100%)    O2 Parameters below as of 29 Aug 2024 12:00  Patient On (Oxygen Delivery Method): ventilator, CPAP    O2 Concentration (%): 30        I&O's Summary    28 Aug 2024 07:01  -  29 Aug 2024 07:00  --------------------------------------------------------  IN: 1640 mL / OUT: 1500 mL / NET: 140 mL    29 Aug 2024 07:01  -  29 Aug 2024 14:29  --------------------------------------------------------  IN: 515 mL / OUT: 0 mL / NET: 515 mL        MEDICATIONS  (STANDING):  albuterol/ipratropium for Nebulization 3 milliLiter(s) Nebulizer every 6 hours  amLODIPine   Tablet 5 milliGRAM(s) Oral daily  aspirin  chewable 81 milliGRAM(s) Oral daily  atorvastatin 20 milliGRAM(s) Oral daily  chlorhexidine 0.12% Liquid 15 milliLiter(s) Oral Mucosa every 12 hours  chlorhexidine 2% Cloths 1 Application(s) Topical daily  ertapenem  IVPB 1000 milliGRAM(s) IV Intermittent every 24 hours  ferrous    sulfate Liquid 300 milliGRAM(s) Enteral Tube daily  heparin   Injectable 5000 Unit(s) SubCutaneous every 8 hours  nystatin Powder 1 Application(s) Topical two times a day  pantoprazole  Injectable 40 milliGRAM(s) IV Push every 12 hours  polyethylene glycol 3350 17 Gram(s) Oral daily  senna 2 Tablet(s) Oral at bedtime  sodium chloride 3%  Inhalation 4 milliLiter(s) Inhalation every 12 hours  tamsulosin 0.4 milliGRAM(s) Oral at bedtime      RESPIRATORY:  Mode: CPAP with PS  FiO2: 30  PEEP: 6  PS: 7  MAP: 10  PIP: 14      IMAGING:   Recent imaging studies were reviewed.    LAB RESULTS:                          7.6    6.86  )-----------( 192      ( 29 Aug 2024 02:39 )             23.8           08-29    135  |  101  |  34.3<H>  ----------------------------<  136<H>  4.8   |  25.0  |  1.34<H>    Ca    8.0<L>      29 Aug 2024 02:39  Phos  3.0     08-29  Mg     2.3     08-29

## 2024-08-30 NOTE — PROGRESS NOTE ADULT - ASSESSMENT
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  Acute resp failure due to neurologic injury.  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  H/o Stage 3 colon cancer in 2009, had surgery; hemorrhoids, h/o LGIB.    Plan:   - neurochecks q4h  - pain control: tylenol PRN, minimizing sedation  - ASA  - mobility: ROM in bed   - SBP goal 100-180; cont Amlodipine 5mg daily, statin  - continue HFNC, wean as tolerated over weekend  - chest PT, Mucomyst/alb nebs  - NPO for now, family discussing if NGT and/or PEG within GOC  - monitor e-lytes, I/Os, Thomas while awaiting BM, flomax 0.4mg  - maintain -180, ISS  - resistant Proteus, ?tracheobronchitis leading to mucus plug. Appreciate ID. Treat for 5 days.  - DVT prophylaxis: SCD, Chemoppx; iron supplements    My full attention was spent providing medically necessary critical care to the patient with details documented in my note above.   Critical care time spent examining patient, reviewing vitals, labs, medications, imaging and discussing with the team goals of care   The combined critical care time provided to the patient was 60 minutes  This time does not include bedside procedures that are documented separately.

## 2024-08-30 NOTE — PROGRESS NOTE ADULT - SUBJECTIVE AND OBJECTIVE BOX
CC:  Follow up GOC , Symptoms    OVERNIGHT EVENTS:  Extubated - on HF  Per NSICU - patient DNR no Reintubation    Present Symptoms:   Dyspnea:  mildly  Nausea/Vomiting:  No   Anxiety/ Agitation No     Depression:   Unable to assess  Fatigue:   Unable  to assess  Loss of appetite:   NA   Constipation: Not Reported     Pain  No Signs            Location            Duration            Character            Severity            Factors            Effect    Pain AD Score:  [email or web address] (press ctrl + left click to view)    Review of Systems: Reviewed as above  Further ROS unable  to  obtain due to poor mentation        MEDICATIONS  (STANDING):  albuterol/ipratropium for Nebulization 3 milliLiter(s) Nebulizer every 6 hours  amLODIPine   Tablet 5 milliGRAM(s) Oral daily  aspirin  chewable 81 milliGRAM(s) Oral daily  atorvastatin 20 milliGRAM(s) Oral daily  chlorhexidine 2% Cloths 1 Application(s) Topical daily  ertapenem  IVPB 1000 milliGRAM(s) IV Intermittent every 24 hours  ferrous    sulfate Liquid 300 milliGRAM(s) Enteral Tube daily  heparin   Injectable 5000 Unit(s) SubCutaneous every 8 hours  nystatin Powder 1 Application(s) Topical two times a day  pantoprazole  Injectable 40 milliGRAM(s) IV Push every 12 hours  polyethylene glycol 3350 17 Gram(s) Oral daily  senna 2 Tablet(s) Oral at bedtime  tamsulosin 0.4 milliGRAM(s) Oral at bedtime    MEDICATIONS  (PRN):  acetylcysteine 20%  Inhalation 4 milliLiter(s) Inhalation every 6 hours PRN Thick secretions  bisacodyl Suppository 10 milliGRAM(s) Rectal daily PRN Constipation  hydrALAZINE Injectable 10 milliGRAM(s) IV Push every 2 hours PRN SBP>160  magnesium hydroxide Suspension 30 milliLiter(s) Oral daily PRN Constipation      PHYSICAL EXAM:    Vital Signs Last 24 Hrs  T(C): 36.6 (30 Aug 2024 15:00), Max: 37.7 (29 Aug 2024 20:00)  T(F): 97.9 (30 Aug 2024 15:00), Max: 99.9 (29 Aug 2024 20:00)  HR: 90 (30 Aug 2024 15:00) (60 - 103)  BP: 112/67 (30 Aug 2024 15:00) (107/83 - 145/61)  BP(mean): 79 (30 Aug 2024 15:00) (73 - 134)  RR: 17 (30 Aug 2024 15:00) (13 - 21)  SpO2: 100% (30 Aug 2024 15:00) (89% - 100%)    Parameters below as of 30 Aug 2024 13:30  Patient On (Oxygen Delivery Method): nasal cannula, high flow  O2 Flow (L/min): 40  O2 Concentration (%): 40    Karnofsky: 20 %  General:    Elderly man NAD     HEENT:  NCAT         Lungs: slight labored  CV:  RR  GI:  soft NTND  MSK: bedbound  Skin:  warm/dry  Neuro lethargic  Psych na    LABS:                          8.1    9.91  )-----------( 228      ( 30 Aug 2024 02:30 )             26.0     08-30    138  |  101  |  36.8<H>  ----------------------------<  137<H>  4.8   |  25.0  |  1.40<H>    Ca    8.0<L>      30 Aug 2024 02:30  Phos  3.7     08-30  Mg     2.3     08-30        Urinalysis Basic - ( 30 Aug 2024 02:30 )    Color: x / Appearance: x / SG: x / pH: x  Gluc: 137 mg/dL / Ketone: x  / Bili: x / Urobili: x   Blood: x / Protein: x / Nitrite: x   Leuk Esterase: x / RBC: x / WBC x   Sq Epi: x / Non Sq Epi: x / Bacteria: x      I&O's Summary    29 Aug 2024 07:01  -  30 Aug 2024 07:00  --------------------------------------------------------  IN: 1545 mL / OUT: 1400 mL / NET: 145 mL

## 2024-08-30 NOTE — PROGRESS NOTE ADULT - ASSESSMENT
89[facility]  [facility]  hx HTN, HLD admitted with basilar occlusion, s/p revascularization complicated by respiratory failure needing vent support    CVA/ Basilar Occlusion  Care per NSICU  neuro checks  on TF    Acute Respiratory Failure  PNA  wean as tolerated  monitor sats, wbc, fevers  sputum culture + Proteus   IV abx  Ertapenem     HARINDER on CKD  avoid nephrotoxic agents    Debility  Assist with care  Turn/reposition  Safety precautions    Encounter for Palliative Care  Palliative Care consulted to assist with GOC  Kyree Elena is the reported HCP. HCP form in chart    Patient extubated today - now DNR and DNI per NSICU.    Remains on High Flow   Patient NPO - family will need to eventually decide about FT/PEG vs comfort.  PC to follow and support             89yr man  from [facility]  hx HTN, HLD admitted with basilar occlusion, s/p revascularization complicated by respiratory failure needing vent support    CVA/ Basilar Occlusion  Care per NSICU  neuro checks  on TF    Acute Respiratory Failure  PNA  extubated on HF  sputum culture + Proteus   IV abx  Ertapenem     HARINDER on CKD  avoid nephrotoxic agents    Debility  Assist with care  Turn/reposition  Safety precautions    Encounter for Palliative Care  Palliative Care consulted to assist with GOC  Kyree Elena is the reported HCP. HCP form in chart    Patient extubated today - now DNR and DNI per NSICU.    Remains on High Flow   Patient NPO - family will need to eventually decide about FT/PEG vs comfort.  PC to follow and support

## 2024-08-31 LAB
ANION GAP SERPL CALC-SCNC: 12 MMOL/L — SIGNIFICANT CHANGE UP (ref 5–17)
BUN SERPL-MCNC: 34.8 MG/DL — HIGH (ref 8–20)
CALCIUM SERPL-MCNC: 8.3 MG/DL — LOW (ref 8.4–10.5)
CHLORIDE SERPL-SCNC: 103 MMOL/L — SIGNIFICANT CHANGE UP (ref 96–108)
CO2 SERPL-SCNC: 25 MMOL/L — SIGNIFICANT CHANGE UP (ref 22–29)
CREAT SERPL-MCNC: 1.39 MG/DL — HIGH (ref 0.5–1.3)
EGFR: 48 ML/MIN/1.73M2 — LOW
GLUCOSE SERPL-MCNC: 90 MG/DL — SIGNIFICANT CHANGE UP (ref 70–99)
HCT VFR BLD CALC: 23.6 % — LOW (ref 39–50)
HGB BLD-MCNC: 7.3 G/DL — LOW (ref 13–17)
MAGNESIUM SERPL-MCNC: 2.3 MG/DL — SIGNIFICANT CHANGE UP (ref 1.6–2.6)
MCHC RBC-ENTMCNC: 26.9 PG — LOW (ref 27–34)
MCHC RBC-ENTMCNC: 30.9 GM/DL — LOW (ref 32–36)
MCV RBC AUTO: 87.1 FL — SIGNIFICANT CHANGE UP (ref 80–100)
PHOSPHATE SERPL-MCNC: 3.8 MG/DL — SIGNIFICANT CHANGE UP (ref 2.4–4.7)
PLATELET # BLD AUTO: 200 K/UL — SIGNIFICANT CHANGE UP (ref 150–400)
POTASSIUM SERPL-MCNC: 4.8 MMOL/L — SIGNIFICANT CHANGE UP (ref 3.5–5.3)
POTASSIUM SERPL-SCNC: 4.8 MMOL/L — SIGNIFICANT CHANGE UP (ref 3.5–5.3)
RBC # BLD: 2.71 M/UL — LOW (ref 4.2–5.8)
RBC # FLD: 14.9 % — HIGH (ref 10.3–14.5)
SODIUM SERPL-SCNC: 140 MMOL/L — SIGNIFICANT CHANGE UP (ref 135–145)
WBC # BLD: 7.11 K/UL — SIGNIFICANT CHANGE UP (ref 3.8–10.5)
WBC # FLD AUTO: 7.11 K/UL — SIGNIFICANT CHANGE UP (ref 3.8–10.5)

## 2024-08-31 PROCEDURE — 99291 CRITICAL CARE FIRST HOUR: CPT

## 2024-08-31 RX ORDER — SODIUM CHLORIDE 9 MG/ML
1000 INJECTION INTRAMUSCULAR; INTRAVENOUS; SUBCUTANEOUS
Refills: 0 | Status: ACTIVE | OUTPATIENT
Start: 2024-08-31 | End: 2025-07-30

## 2024-08-31 RX ORDER — ASPIRIN 81 MG
300 TABLET, DELAYED RELEASE (ENTERIC COATED) ORAL DAILY
Refills: 0 | Status: DISCONTINUED | OUTPATIENT
Start: 2024-08-31 | End: 2024-09-01

## 2024-08-31 RX ADMIN — Medication 5000 UNIT(S): at 21:15

## 2024-08-31 RX ADMIN — Medication 5000 UNIT(S): at 05:54

## 2024-08-31 RX ADMIN — IPRATROPIUM BROMIDE AND ALBUTEROL SULFATE 3 MILLILITER(S): .5; 3 SOLUTION RESPIRATORY (INHALATION) at 09:10

## 2024-08-31 RX ADMIN — CHLORHEXIDINE GLUCONATE 1 APPLICATION(S): 40 SOLUTION TOPICAL at 11:01

## 2024-08-31 RX ADMIN — Medication 40 MILLIGRAM(S): at 05:54

## 2024-08-31 RX ADMIN — IPRATROPIUM BROMIDE AND ALBUTEROL SULFATE 3 MILLILITER(S): .5; 3 SOLUTION RESPIRATORY (INHALATION) at 20:12

## 2024-08-31 RX ADMIN — Medication 40 MILLIGRAM(S): at 17:43

## 2024-08-31 RX ADMIN — IPRATROPIUM BROMIDE AND ALBUTEROL SULFATE 3 MILLILITER(S): .5; 3 SOLUTION RESPIRATORY (INHALATION) at 03:23

## 2024-08-31 RX ADMIN — Medication 1 APPLICATION(S): at 05:54

## 2024-08-31 RX ADMIN — IPRATROPIUM BROMIDE AND ALBUTEROL SULFATE 3 MILLILITER(S): .5; 3 SOLUTION RESPIRATORY (INHALATION) at 15:14

## 2024-08-31 RX ADMIN — Medication 1 APPLICATION(S): at 17:43

## 2024-08-31 RX ADMIN — Medication 5000 UNIT(S): at 13:09

## 2024-08-31 RX ADMIN — ERTAPENEM SODIUM 120 MILLIGRAM(S): 1 INJECTION, POWDER, LYOPHILIZED, FOR SOLUTION INTRAMUSCULAR; INTRAVENOUS at 17:43

## 2024-08-31 NOTE — PROGRESS NOTE ADULT - ASSESSMENT
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  Acute resp failure due to neurologic injury.  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  H/o Stage 3 colon cancer in 2009, had surgery; hemorrhoids, h/o LGIB.    Plan:   - neurochecks q4h  - pain control: tylenol PRN, minimizing sedation  - ASA, will eventually need AC for Afib if alined with GOC  - mobility: ROM in bed   - SBP goal 100-180; cont Amlodipine 5mg daily, statin  - continue HFNC, wean as tolerated over weekend  - Aggressive chest PT, Pulm toileting, Mucomyst/alb nebs  - NPO for now, family discussing if NGT and/or PEG within GOC  - monitor e-lytes, I/Os, Thomas while awaiting BM, flomax 0.4mg  - maintain -180, ISS  - resistant Proteus, ?tracheobronchitis leading to mucus plug. Appreciate ID. Treat for 5 days.  - DVT prophylaxis: SCD, Chemoppx; iron supplements    My full attention was spent providing medically necessary critical care to the patient with details documented in my note above.   Critical care time spent examining patient, reviewing vitals, labs, medications, imaging and discussing with the team goals of care   The combined critical care time provided to the patient was 60 minutes  This time does not include bedside procedures that are documented separately.

## 2024-08-31 NOTE — PROGRESS NOTE ADULT - SUBJECTIVE AND OBJECTIVE BOX
NSICU ATTENDING PROGRESS NOTE    HPI:  89 Yo M with PMH of HTN, colon cancer, enlarged prostate transfer from [facility] from [facility] for altered mental status. LKW 7PM, Per chart, Patient initially complained of  Nausea,  vomiting and dizziness s/p dinner, later  found to be unresponsive and decorticate posturing in [place] ED. 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 Patient   transferred to [facility] for NeuroIR intervention.   l (21 Aug 2024 03:06)    24h events:  8/24 - febrile overnight -> fever work-up sent  8/25 - following commands, developed bloody in-line secretions; family discussions - prefer to minimize sedation  8/26 - Tolerating PSV with occasional failed breath triggering, possible mild breathing apraxia  8/30 - Discussed extubation and GOC with family. Discussed that patient lung mechanics had improved but patient suboptimal for medical extubation given secretion burden. Family expressed understanding but wished to continue with extubation to DNR/DNI with possibility of CMO if extubation failure. Extubated to NC with intermittent hypoxic episodes to ~70-80s immediately post-extubation. Started HFNC with improvement.    Exam:  +EO, no FC, PERRL 2 mm BL, +tracking, + cough  showed 2 fingers/thumbs up on LUE  pupils reactive, difficulty abducting L eye, face symmetric, does not protrude tongue  motor - moves L side spont in plane of bed, RUE weak wdrl, RLE wdrl.  regular rate, intermittent afib  normal WOB on HFNC  Abd soft, NT, ND,   Thomas with yellow urine  warm/dry, No peripheral swelling    ------------------------------------------------------------------------------------------------------  ICU Vital Signs Last 24 Hrs  T(C): 37.3 (31 Aug 2024 11:00), Max: 37.8 (30 Aug 2024 20:00)  T(F): 99.1 (31 Aug 2024 11:00), Max: 100 (30 Aug 2024 20:00)  HR: 68 (31 Aug 2024 11:00) (63 - 103)  BP: 127/52 (31 Aug 2024 11:00) (103/58 - 137/62)  BP(mean): 74 (31 Aug 2024 11:00) (71 - 117)  ABP: --  ABP(mean): --  RR: 22 (31 Aug 2024 11:00) (10 - 24)  SpO2: 100% (31 Aug 2024 11:00) (89% - 100%)    O2 Parameters below as of 31 Aug 2024 08:00  Patient On (Oxygen Delivery Method): nasal cannula, high flow    O2 Concentration (%): 30        I&O's Summary    30 Aug 2024 07:01  -  31 Aug 2024 07:00  --------------------------------------------------------  IN: 0 mL / OUT: 1525 mL / NET: -1525 mL    31 Aug 2024 07:01  -  31 Aug 2024 12:23  --------------------------------------------------------  IN: 0 mL / OUT: 235 mL / NET: -235 mL        MEDICATIONS  (STANDING):  albuterol/ipratropium for Nebulization 3 milliLiter(s) Nebulizer every 6 hours  amLODIPine   Tablet 5 milliGRAM(s) Oral daily  aspirin  chewable 81 milliGRAM(s) Oral daily  atorvastatin 20 milliGRAM(s) Oral daily  chlorhexidine 2% Cloths 1 Application(s) Topical daily  ertapenem  IVPB 1000 milliGRAM(s) IV Intermittent every 24 hours  ferrous    sulfate Liquid 300 milliGRAM(s) Enteral Tube daily  heparin   Injectable 5000 Unit(s) SubCutaneous every 8 hours  nystatin Powder 1 Application(s) Topical two times a day  pantoprazole  Injectable 40 milliGRAM(s) IV Push every 12 hours  polyethylene glycol 3350 17 Gram(s) Oral daily  senna 2 Tablet(s) Oral at bedtime  tamsulosin 0.4 milliGRAM(s) Oral at bedtime      RESPIRATORY:      IMAGING:   Recent imaging studies were reviewed.    LAB RESULTS:                          7.3    7.11  )-----------( 200      ( 31 Aug 2024 04:55 )             23.6           08-31    140  |  103  |  34.8<H>  ----------------------------<  90  4.8   |  25.0  |  1.39<H>    Ca    8.3<L>      31 Aug 2024 04:55  Phos  3.8     08-31  Mg     2.3     08-31

## 2024-09-01 LAB
ANION GAP SERPL CALC-SCNC: 13 MMOL/L — SIGNIFICANT CHANGE UP (ref 5–17)
BUN SERPL-MCNC: 37.8 MG/DL — HIGH (ref 8–20)
CALCIUM SERPL-MCNC: 8.3 MG/DL — LOW (ref 8.4–10.5)
CHLORIDE SERPL-SCNC: 104 MMOL/L — SIGNIFICANT CHANGE UP (ref 96–108)
CO2 SERPL-SCNC: 25 MMOL/L — SIGNIFICANT CHANGE UP (ref 22–29)
CREAT SERPL-MCNC: 1.49 MG/DL — HIGH (ref 0.5–1.3)
EGFR: 44 ML/MIN/1.73M2 — LOW
GLUCOSE BLDC GLUCOMTR-MCNC: 87 MG/DL — SIGNIFICANT CHANGE UP (ref 70–99)
GLUCOSE SERPL-MCNC: 77 MG/DL — SIGNIFICANT CHANGE UP (ref 70–99)
HCT VFR BLD CALC: 24.3 % — LOW (ref 39–50)
HGB BLD-MCNC: 7.6 G/DL — LOW (ref 13–17)
MAGNESIUM SERPL-MCNC: 2.3 MG/DL — SIGNIFICANT CHANGE UP (ref 1.6–2.6)
MCHC RBC-ENTMCNC: 27.2 PG — SIGNIFICANT CHANGE UP (ref 27–34)
MCHC RBC-ENTMCNC: 31.3 GM/DL — LOW (ref 32–36)
MCV RBC AUTO: 87.1 FL — SIGNIFICANT CHANGE UP (ref 80–100)
PHOSPHATE SERPL-MCNC: 3.8 MG/DL — SIGNIFICANT CHANGE UP (ref 2.4–4.7)
PLATELET # BLD AUTO: 245 K/UL — SIGNIFICANT CHANGE UP (ref 150–400)
POTASSIUM SERPL-MCNC: 4.5 MMOL/L — SIGNIFICANT CHANGE UP (ref 3.5–5.3)
POTASSIUM SERPL-SCNC: 4.5 MMOL/L — SIGNIFICANT CHANGE UP (ref 3.5–5.3)
RBC # BLD: 2.79 M/UL — LOW (ref 4.2–5.8)
RBC # FLD: 14.8 % — HIGH (ref 10.3–14.5)
SODIUM SERPL-SCNC: 142 MMOL/L — SIGNIFICANT CHANGE UP (ref 135–145)
WBC # BLD: 7.03 K/UL — SIGNIFICANT CHANGE UP (ref 3.8–10.5)
WBC # FLD AUTO: 7.03 K/UL — SIGNIFICANT CHANGE UP (ref 3.8–10.5)

## 2024-09-01 PROCEDURE — 99291 CRITICAL CARE FIRST HOUR: CPT

## 2024-09-01 PROCEDURE — 71045 X-RAY EXAM CHEST 1 VIEW: CPT | Mod: 26

## 2024-09-01 RX ADMIN — Medication 1 APPLICATION(S): at 17:17

## 2024-09-01 RX ADMIN — IPRATROPIUM BROMIDE AND ALBUTEROL SULFATE 3 MILLILITER(S): .5; 3 SOLUTION RESPIRATORY (INHALATION) at 03:54

## 2024-09-01 RX ADMIN — Medication 1 APPLICATION(S): at 05:21

## 2024-09-01 RX ADMIN — Medication 40 MILLIGRAM(S): at 05:13

## 2024-09-01 RX ADMIN — Medication 40 MILLIGRAM(S): at 17:17

## 2024-09-01 RX ADMIN — IPRATROPIUM BROMIDE AND ALBUTEROL SULFATE 3 MILLILITER(S): .5; 3 SOLUTION RESPIRATORY (INHALATION) at 14:52

## 2024-09-01 RX ADMIN — Medication 5000 UNIT(S): at 13:07

## 2024-09-01 RX ADMIN — IPRATROPIUM BROMIDE AND ALBUTEROL SULFATE 3 MILLILITER(S): .5; 3 SOLUTION RESPIRATORY (INHALATION) at 08:37

## 2024-09-01 RX ADMIN — IPRATROPIUM BROMIDE AND ALBUTEROL SULFATE 3 MILLILITER(S): .5; 3 SOLUTION RESPIRATORY (INHALATION) at 20:20

## 2024-09-01 RX ADMIN — CHLORHEXIDINE GLUCONATE 1 APPLICATION(S): 40 SOLUTION TOPICAL at 11:12

## 2024-09-01 RX ADMIN — Medication 5000 UNIT(S): at 22:26

## 2024-09-01 RX ADMIN — Medication 5000 UNIT(S): at 05:13

## 2024-09-01 NOTE — PROGRESS NOTE ADULT - SUBJECTIVE AND OBJECTIVE BOX
NSICU ATTENDING PROGRESS NOTE    HPI:  89 Yo M with PMH of HTN, colon cancer, enlarged prostate transfer from [facility] from [facility] for altered mental status. LKW 7PM, Per chart, Patient initially complained of  Nausea,  vomiting and dizziness s/p dinner, later  found to be unresponsive and decorticate posturing in [place] ED. 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 Patient   transferred to [facility] for NeuroIR intervention.   l (21 Aug 2024 03:06)    24h events:  8/24 - febrile overnight -> fever work-up sent  8/25 - following commands, developed bloody in-line secretions; family discussions - prefer to minimize sedation  8/26 - Tolerating PSV with occasional failed breath triggering, possible mild breathing apraxia  8/30 - Discussed extubation and GOC with family. Discussed that patient lung mechanics had improved but patient suboptimal for medical extubation given secretion burden. Family expressed understanding but wished to continue with extubation to DNR/DNI with possibility of CMO if extubation failure. Extubated to NC with intermittent hypoxic episodes to ~70-80s immediately post-extubation. Started HFNC with improvement.    Exam:  +EO, no FC, PERRL 2 mm BL, +tracking, + cough  showed 2 fingers/thumbs up on LUE  pupils reactive, difficulty abducting L eye, face symmetric, does not protrude tongue  motor - moves L side spont in plane of bed, RUE weak wdrl, RLE wdrl.  regular rate, intermittent afib  normal WOB on HFNC  Abd soft, NT, ND,   Thomas with yellow urine  warm/dry, No peripheral swelling    ------------------------------------------------------------------------------------------------------  ICU Vital Signs Last 24 Hrs  T(C): 37.3 (01 Sep 2024 11:00), Max: 37.7 (01 Sep 2024 02:00)  T(F): 99.1 (01 Sep 2024 11:00), Max: 99.9 (01 Sep 2024 02:00)  HR: 76 (01 Sep 2024 11:00) (65 - 80)  BP: 130/63 (01 Sep 2024 11:00) (118/50 - 152/80)  BP(mean): 83 (01 Sep 2024 11:00) (72 - 108)  ABP: --  ABP(mean): --  RR: 23 (01 Sep 2024 11:00) (13 - 24)  SpO2: 96% (01 Sep 2024 11:00) (94% - 99%)    O2 Parameters below as of 01 Sep 2024 08:50  Patient On (Oxygen Delivery Method): nasal cannula, high flow            I&O's Summary    31 Aug 2024 07:01  -  01 Sep 2024 07:00  --------------------------------------------------------  IN: 700 mL / OUT: 1760 mL / NET: -1060 mL    01 Sep 2024 07:01  -  01 Sep 2024 12:09  --------------------------------------------------------  IN: 250 mL / OUT: 350 mL / NET: -100 mL        MEDICATIONS  (STANDING):  albuterol/ipratropium for Nebulization 3 milliLiter(s) Nebulizer every 6 hours  amLODIPine   Tablet 5 milliGRAM(s) Oral daily  atorvastatin 20 milliGRAM(s) Oral daily  chlorhexidine 2% Cloths 1 Application(s) Topical daily  ferrous    sulfate Liquid 300 milliGRAM(s) Enteral Tube daily  heparin   Injectable 5000 Unit(s) SubCutaneous every 8 hours  nystatin Powder 1 Application(s) Topical two times a day  pantoprazole  Injectable 40 milliGRAM(s) IV Push every 12 hours  polyethylene glycol 3350 17 Gram(s) Oral daily  senna 2 Tablet(s) Oral at bedtime  sodium chloride 0.9%. 1000 milliLiter(s) (50 mL/Hr) IV Continuous <Continuous>  tamsulosin 0.4 milliGRAM(s) Oral at bedtime      RESPIRATORY:      IMAGING:   Recent imaging studies were reviewed.    LAB RESULTS:                          7.6    7.03  )-----------( 245      ( 01 Sep 2024 04:44 )             24.3           09-01    142  |  104  |  37.8<H>  ----------------------------<  77  4.5   |  25.0  |  1.49<H>    Ca    8.3<L>      01 Sep 2024 04:44  Phos  3.8     09-01  Mg     2.3     09-01

## 2024-09-01 NOTE — PROGRESS NOTE ADULT - ASSESSMENT
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  Acute resp failure due to neurologic injury.  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  H/o Stage 3 colon cancer in 2009, had surgery; hemorrhoids, h/o LGIB.    Plan:   - neurochecks q4h  - pain control: tylenol PRN, minimizing sedation  - ASA, will eventually need AC for Afib if alined with GOC  - mobility: ROM in bed   - SBP goal 100-180; cont Amlodipine 5mg daily, statin  - continue HFNC, wean as tolerated  - Aggressive chest PT, Pulm toileting, Mucomyst/alb nebs  - NPO for now, family discussing if NGT and/or PEG within GOC  - monitor e-lytes, I/Os, Thomas while awaiting BM, flomax 0.4mg  - maintain -180, ISS  - sp abx for resistant Proteus  - DVT prophylaxis: SCD, Chemoppx; iron supplements    My full attention was spent providing medically necessary critical care to the patient with details documented in my note above.   Critical care time spent examining patient, reviewing vitals, labs, medications, imaging and discussing with the team goals of care   The combined critical care time provided to the patient was 60 minutes  This time does not include bedside procedures that are documented separately.

## 2024-09-02 LAB
ANION GAP SERPL CALC-SCNC: 13 MMOL/L — SIGNIFICANT CHANGE UP (ref 5–17)
BUN SERPL-MCNC: 38.8 MG/DL — HIGH (ref 8–20)
CALCIUM SERPL-MCNC: 8.5 MG/DL — SIGNIFICANT CHANGE UP (ref 8.4–10.5)
CHLORIDE SERPL-SCNC: 104 MMOL/L — SIGNIFICANT CHANGE UP (ref 96–108)
CO2 SERPL-SCNC: 22 MMOL/L — SIGNIFICANT CHANGE UP (ref 22–29)
CREAT SERPL-MCNC: 1.37 MG/DL — HIGH (ref 0.5–1.3)
EGFR: 48 ML/MIN/1.73M2 — LOW
GLUCOSE SERPL-MCNC: 78 MG/DL — SIGNIFICANT CHANGE UP (ref 70–99)
HCT VFR BLD CALC: 26.5 % — LOW (ref 39–50)
HGB BLD-MCNC: 8.5 G/DL — LOW (ref 13–17)
MAGNESIUM SERPL-MCNC: 2.3 MG/DL — SIGNIFICANT CHANGE UP (ref 1.6–2.6)
MCHC RBC-ENTMCNC: 27.5 PG — SIGNIFICANT CHANGE UP (ref 27–34)
MCHC RBC-ENTMCNC: 32.1 GM/DL — SIGNIFICANT CHANGE UP (ref 32–36)
MCV RBC AUTO: 85.8 FL — SIGNIFICANT CHANGE UP (ref 80–100)
PHOSPHATE SERPL-MCNC: 3.9 MG/DL — SIGNIFICANT CHANGE UP (ref 2.4–4.7)
PLATELET # BLD AUTO: 246 K/UL — SIGNIFICANT CHANGE UP (ref 150–400)
POTASSIUM SERPL-MCNC: 4.3 MMOL/L — SIGNIFICANT CHANGE UP (ref 3.5–5.3)
POTASSIUM SERPL-SCNC: 4.3 MMOL/L — SIGNIFICANT CHANGE UP (ref 3.5–5.3)
RBC # BLD: 3.09 M/UL — LOW (ref 4.2–5.8)
RBC # FLD: 14.7 % — HIGH (ref 10.3–14.5)
SODIUM SERPL-SCNC: 139 MMOL/L — SIGNIFICANT CHANGE UP (ref 135–145)
WBC # BLD: 7.83 K/UL — SIGNIFICANT CHANGE UP (ref 3.8–10.5)
WBC # FLD AUTO: 7.83 K/UL — SIGNIFICANT CHANGE UP (ref 3.8–10.5)

## 2024-09-02 PROCEDURE — 99291 CRITICAL CARE FIRST HOUR: CPT

## 2024-09-02 RX ORDER — POVIDONE, PROPYLENE GLYCOL 6.8; 3 MG/ML; MG/ML
1 LIQUID OPHTHALMIC EVERY 6 HOURS
Refills: 0 | Status: DISCONTINUED | OUTPATIENT
Start: 2024-09-02 | End: 2024-09-06

## 2024-09-02 RX ORDER — ASPIRIN 81 MG
300 TABLET, DELAYED RELEASE (ENTERIC COATED) ORAL DAILY
Refills: 0 | Status: ACTIVE | OUTPATIENT
Start: 2024-09-02 | End: 2025-08-01

## 2024-09-02 RX ADMIN — Medication 1 APPLICATION(S): at 17:04

## 2024-09-02 RX ADMIN — IPRATROPIUM BROMIDE AND ALBUTEROL SULFATE 3 MILLILITER(S): .5; 3 SOLUTION RESPIRATORY (INHALATION) at 21:19

## 2024-09-02 RX ADMIN — Medication 1 APPLICATION(S): at 05:15

## 2024-09-02 RX ADMIN — IPRATROPIUM BROMIDE AND ALBUTEROL SULFATE 3 MILLILITER(S): .5; 3 SOLUTION RESPIRATORY (INHALATION) at 09:18

## 2024-09-02 RX ADMIN — SODIUM CHLORIDE 50 MILLILITER(S): 9 INJECTION INTRAMUSCULAR; INTRAVENOUS; SUBCUTANEOUS at 05:31

## 2024-09-02 RX ADMIN — Medication 5000 UNIT(S): at 05:16

## 2024-09-02 RX ADMIN — Medication 5000 UNIT(S): at 21:34

## 2024-09-02 RX ADMIN — IPRATROPIUM BROMIDE AND ALBUTEROL SULFATE 3 MILLILITER(S): .5; 3 SOLUTION RESPIRATORY (INHALATION) at 15:43

## 2024-09-02 RX ADMIN — Medication 40 MILLIGRAM(S): at 05:16

## 2024-09-02 RX ADMIN — Medication 40 MILLIGRAM(S): at 17:04

## 2024-09-02 RX ADMIN — IPRATROPIUM BROMIDE AND ALBUTEROL SULFATE 3 MILLILITER(S): .5; 3 SOLUTION RESPIRATORY (INHALATION) at 02:10

## 2024-09-02 RX ADMIN — CHLORHEXIDINE GLUCONATE 1 APPLICATION(S): 40 SOLUTION TOPICAL at 11:38

## 2024-09-02 RX ADMIN — Medication 5000 UNIT(S): at 13:03

## 2024-09-02 RX ADMIN — Medication 300 MILLIGRAM(S): at 13:02

## 2024-09-02 NOTE — PROGRESS NOTE ADULT - SUBJECTIVE AND OBJECTIVE BOX
Chief complaint:   Patient is a 89y old  Male who presents with a chief complaint of Stroke (01 Sep 2024 12:08)    HPI:  89 Yo M with PMH of HTN, colon cancer, enlarged prostate transfer from [facility] from [facility] for altered mental status. LKW 7PM, Per chart, Patient initially complained of  Nausea,  vomiting and dizziness s/p dinner, later  found to be unresponsive and decorticate posturing in [place] ED. 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 Patient   transferred to [facility] for NeuroIR intervention.     NIHSS performed at20-Aug-2024 22:30 @ [facility]     NIH 1a. Level of Consciousness(3) Responds only with reflex motor or autonomic effects or totally unresponsive, flaccid, and areflex   NIH 1b. LOC Questions(2) Answers neither question correctly   NIH 1c. LOC Commands(2) Performs neither task correctly   NIH 2. Best Gaze(2) Forced deviation, or total gaze paresis not overcome by the oculocephalic maneuver   NIH 3. Visual(3) Bilateral hemianopia (blind including cortical blindness)   NIH 4. Facial Palsy(0) Normal symmetrical movements   NIH 5a. Motor Arm, Left(3) No effort against gravity; limb falls   NIH 5b. Motor Arm, Right(3) No effort against gravity; limb falls   NIH 6a. Motor Leg, Left(3) No effort against gravity; leg falls to bed immediately   NIH 6b. Motor Leg, Right(3) No effort against gravity; leg falls to bed immediately   NIH 7. Limb Ataxia(0) Absent   NIH 8. Sensory(2) Severe to total sensory loss; patient is not aware of being touched in the face, arm, and leg   NIH 9. Best Language(3) Mute, global aphasia; no usable speech or auditory comprehension   NIH 10. Dysarthria(UN) Intubated or other physical barrier   NIH 11. Extinction and Inattention (formally neglect)(2) Profound darren-inattention/extinction more than 1 modality (1) Visual, tactile, auditory, spatial, or personal inattention or extinction to bilateral simultaneous stimulation in one of the sensory modalities    NIH Stroke Scale: Total31 30    NIHSS [id number] (severe stroke) (21 Aug 2024 03:06)        24hr EVENTS:      ROS: [ ]  Unable to assess due to mental status   All other systems negative    -----------------------------------------------------------------------------------------------------------------------------------------------------------------------------------  ICU Vital Signs Last 24 Hrs  T(C): 37 (02 Sep 2024 08:00), Max: 37.3 (01 Sep 2024 11:00)  T(F): 98.6 (02 Sep 2024 08:00), Max: 99.1 (01 Sep 2024 11:00)  HR: 63 (02 Sep 2024 08:00) (62 - 78)  BP: 125/49 (02 Sep 2024 08:00) (125/49 - 160/54)  BP(mean): 71 (02 Sep 2024 08:00) (71 - 97)  ABP: --  ABP(mean): --  RR: 16 (02 Sep 2024 08:00) (10 - 23)  SpO2: 97% (02 Sep 2024 08:00) (92% - 100%)    O2 Parameters below as of 02 Sep 2024 08:00  Patient On (Oxygen Delivery Method): nasal cannula, high flow            I&O's Summary    01 Sep 2024 07:01  -  02 Sep 2024 07:00  --------------------------------------------------------  IN: 1200 mL / OUT: 1645 mL / NET: -445 mL        MEDICATIONS  (STANDING):  albuterol/ipratropium for Nebulization 3 milliLiter(s) Nebulizer every 6 hours  amLODIPine   Tablet 5 milliGRAM(s) Oral daily  atorvastatin 20 milliGRAM(s) Oral daily  chlorhexidine 2% Cloths 1 Application(s) Topical daily  ferrous    sulfate Liquid 300 milliGRAM(s) Enteral Tube daily  heparin   Injectable 5000 Unit(s) SubCutaneous every 8 hours  nystatin Powder 1 Application(s) Topical two times a day  pantoprazole  Injectable 40 milliGRAM(s) IV Push every 12 hours  polyethylene glycol 3350 17 Gram(s) Oral daily  senna 2 Tablet(s) Oral at bedtime  sodium chloride 0.9%. 1000 milliLiter(s) (50 mL/Hr) IV Continuous <Continuous>  tamsulosin 0.4 milliGRAM(s) Oral at bedtime      RESPIRATORY:        IMAGING:   Recent imaging studies were reviewed.    LAB RESULTS:                          8.5    7.83  )-----------( 246      ( 02 Sep 2024 03:07 )             26.5           09-02    139  |  104  |  38.8<H>  ----------------------------<  78  4.3   |  22.0  |  1.37<H>    Ca    8.5      02 Sep 2024 03:07  Phos  3.9     09-02  Mg     2.3     09-02                  -----------------------------------------------------------------------------------------------------------------------------------------------------------------------------------    PHYSICAL EXAM:  General: Calm  HEENT: MMM  Neuro:  -Mental status- No acute distress  -CN- PERRL 3mm, EOMI, tongue midline, face symmetric    CV: RRR  Pulm: clear to auscultation  Abd: Soft, nontender, nondistended  Ext: no noted edema in lower ext  Skin: warm, dry       Chief complaint:   Patient is a 89y old  Male who presents with a chief complaint of Stroke (01 Sep 2024 12:08)    HPI:  89 Yo M with PMH of HTN, colon cancer, enlarged prostate transfer from [facility] from [facility] for altered mental status. LKW 7PM, Per chart, Patient initially complained of  Nausea,  vomiting and dizziness s/p dinner, later  found to be unresponsive and decorticate posturing in [place] ED. 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 Patient   transferred to [facility] for NeuroIR intervention.     NIHSS performed at20-Aug-2024 22:30 @ [facility]     NIH 1a. Level of Consciousness(3) Responds only with reflex motor or autonomic effects or totally unresponsive, flaccid, and areflex   NIH 1b. LOC Questions(2) Answers neither question correctly   NIH 1c. LOC Commands(2) Performs neither task correctly   NIH 2. Best Gaze(2) Forced deviation, or total gaze paresis not overcome by the oculocephalic maneuver   NIH 3. Visual(3) Bilateral hemianopia (blind including cortical blindness)   NIH 4. Facial Palsy(0) Normal symmetrical movements   NIH 5a. Motor Arm, Left(3) No effort against gravity; limb falls   NIH 5b. Motor Arm, Right(3) No effort against gravity; limb falls   NIH 6a. Motor Leg, Left(3) No effort against gravity; leg falls to bed immediately   NIH 6b. Motor Leg, Right(3) No effort against gravity; leg falls to bed immediately   NIH 7. Limb Ataxia(0) Absent   NIH 8. Sensory(2) Severe to total sensory loss; patient is not aware of being touched in the face, arm, and leg   NIH 9. Best Language(3) Mute, global aphasia; no usable speech or auditory comprehension   NIH 10. Dysarthria(UN) Intubated or other physical barrier   NIH 11. Extinction and Inattention (formally neglect)(2) Profound darren-inattention/extinction more than 1 modality (1) Visual, tactile, auditory, spatial, or personal inattention or extinction to bilateral simultaneous stimulation in one of the sensory modalities    NIH Stroke Scale: Total31 30    NIHSS [id number] (severe stroke) (21 Aug 2024 03:06)        24hr EVENTS: no acute issues      ROS: [x ]  Unable to assess due to mental status   All other systems negative    -----------------------------------------------------------------------------------------------------------------------------------------------------------------------------------  ICU Vital Signs Last 24 Hrs  T(C): 37 (02 Sep 2024 08:00), Max: 37.3 (01 Sep 2024 11:00)  T(F): 98.6 (02 Sep 2024 08:00), Max: 99.1 (01 Sep 2024 11:00)  HR: 63 (02 Sep 2024 08:00) (62 - 78)  BP: 125/49 (02 Sep 2024 08:00) (125/49 - 160/54)  BP(mean): 71 (02 Sep 2024 08:00) (71 - 97)  ABP: --  ABP(mean): --  RR: 16 (02 Sep 2024 08:00) (10 - 23)  SpO2: 97% (02 Sep 2024 08:00) (92% - 100%)    O2 Parameters below as of 02 Sep 2024 08:00  Patient On (Oxygen Delivery Method): nasal cannula, high flow      I&O's Summary    01 Sep 2024 07:01  -  02 Sep 2024 07:00  --------------------------------------------------------  IN: 1200 mL / OUT: 1645 mL / NET: -445 mL        MEDICATIONS  (STANDING):  albuterol/ipratropium for Nebulization 3 milliLiter(s) Nebulizer every 6 hours  amLODIPine   Tablet 5 milliGRAM(s) Oral daily  atorvastatin 20 milliGRAM(s) Oral daily  chlorhexidine 2% Cloths 1 Application(s) Topical daily  ferrous    sulfate Liquid 300 milliGRAM(s) Enteral Tube daily  heparin   Injectable 5000 Unit(s) SubCutaneous every 8 hours  nystatin Powder 1 Application(s) Topical two times a day  pantoprazole  Injectable 40 milliGRAM(s) IV Push every 12 hours  polyethylene glycol 3350 17 Gram(s) Oral daily  senna 2 Tablet(s) Oral at bedtime  sodium chloride 0.9%. 1000 milliLiter(s) (50 mL/Hr) IV Continuous <Continuous>  tamsulosin 0.4 milliGRAM(s) Oral at bedtime    IMAGING:   Recent imaging studies were reviewed.    LAB RESULTS:                          8.5    7.83  )-----------( 246      ( 02 Sep 2024 03:07 )             26.5     09-02    139  |  104  |  38.8<H>  ----------------------------<  78  4.3   |  22.0  |  1.37<H>    Ca    8.5      02 Sep 2024 03:07  Phos  3.9     09-02  Mg     2.3     09-02        -----------------------------------------------------------------------------------------------------------------------------------------------------------------------------------    PHYSICAL EXAM:  General: Calm  HEENT: MMM  Neuro:  -Mental status- No acute distress, EO to stim, FC on L  -CN- PERRL 3mm, EOMI, R facial droop  R WD  LUE AG shows 2 fingers  LLE AG    CV: RRR  Pulm: clear to auscultation  Abd: Soft, nontender, nondistended  Ext: no noted edema in lower ext  Skin: warm, dry

## 2024-09-02 NOTE — PROGRESS NOTE ADULT - ASSESSMENT
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  Acute resp failure due to neurologic injury.  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  H/o Stage 3 colon cancer in 2009, had surgery; hemorrhoids, h/o LGIB.    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  Acute resp failure due to neurologic injury.  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  H/o Stage 3 colon cancer in 2009, had surgery; hemorrhoids, h/o LGIB.    Plan:   - neurochecks q4h  - pain control: tylenol PRN, minimizing sedation  - IL ASA, will eventually need AC for Afib if alined with GOC  - mobility: ROM in bed   - SBP goal 100-160; cont Amlodipine 5mg daily, statin  - continue HFNC, wean as tolerated  - Aggressive chest PT, Pulm toileting, Mucomyst/alb nebs  - NPO for now, family discussing if NGT and/or PEG within GOC  - monitor e-lytes, I/Os, Thomas while awaiting BM, flomax 0.4mg held while NPO  - LBM 9/2  - maintain -180, ISS  - s/p abx for resistant Proteus  - DVT prophylaxis: SCD, Chemoppx; iron supplements

## 2024-09-02 NOTE — PROGRESS NOTE ADULT - CRITICAL CARE ATTENDING COMMENT
I spent 60 minutes of critical care time examining patient, reviewing vitals, labs, medications, imaging and discussing with the team goals of care to prevent life-threatening in this patient who is at high risk for deterioration or death due to:  stroke

## 2024-09-03 LAB
ANION GAP SERPL CALC-SCNC: 14 MMOL/L — SIGNIFICANT CHANGE UP (ref 5–17)
BUN SERPL-MCNC: 34.5 MG/DL — HIGH (ref 8–20)
CALCIUM SERPL-MCNC: 8.4 MG/DL — SIGNIFICANT CHANGE UP (ref 8.4–10.5)
CHLORIDE SERPL-SCNC: 106 MMOL/L — SIGNIFICANT CHANGE UP (ref 96–108)
CO2 SERPL-SCNC: 22 MMOL/L — SIGNIFICANT CHANGE UP (ref 22–29)
CREAT SERPL-MCNC: 1.32 MG/DL — HIGH (ref 0.5–1.3)
EGFR: 51 ML/MIN/1.73M2 — LOW
GLUCOSE SERPL-MCNC: 87 MG/DL — SIGNIFICANT CHANGE UP (ref 70–99)
HCT VFR BLD CALC: 27.4 % — LOW (ref 39–50)
HGB BLD-MCNC: 8.7 G/DL — LOW (ref 13–17)
MAGNESIUM SERPL-MCNC: 2.1 MG/DL — SIGNIFICANT CHANGE UP (ref 1.6–2.6)
MCHC RBC-ENTMCNC: 27.6 PG — SIGNIFICANT CHANGE UP (ref 27–34)
MCHC RBC-ENTMCNC: 31.8 GM/DL — LOW (ref 32–36)
MCV RBC AUTO: 87 FL — SIGNIFICANT CHANGE UP (ref 80–100)
PHOSPHATE SERPL-MCNC: 3.9 MG/DL — SIGNIFICANT CHANGE UP (ref 2.4–4.7)
PLATELET # BLD AUTO: 196 K/UL — SIGNIFICANT CHANGE UP (ref 150–400)
POTASSIUM SERPL-MCNC: 4.5 MMOL/L — SIGNIFICANT CHANGE UP (ref 3.5–5.3)
POTASSIUM SERPL-SCNC: 4.5 MMOL/L — SIGNIFICANT CHANGE UP (ref 3.5–5.3)
RBC # BLD: 3.15 M/UL — LOW (ref 4.2–5.8)
RBC # FLD: 14.5 % — SIGNIFICANT CHANGE UP (ref 10.3–14.5)
SODIUM SERPL-SCNC: 142 MMOL/L — SIGNIFICANT CHANGE UP (ref 135–145)
WBC # BLD: 10.67 K/UL — HIGH (ref 3.8–10.5)
WBC # FLD AUTO: 10.67 K/UL — HIGH (ref 3.8–10.5)

## 2024-09-03 PROCEDURE — 99233 SBSQ HOSP IP/OBS HIGH 50: CPT

## 2024-09-03 RX ADMIN — Medication 5000 UNIT(S): at 06:13

## 2024-09-03 RX ADMIN — IPRATROPIUM BROMIDE AND ALBUTEROL SULFATE 3 MILLILITER(S): .5; 3 SOLUTION RESPIRATORY (INHALATION) at 23:26

## 2024-09-03 RX ADMIN — Medication 5000 UNIT(S): at 21:46

## 2024-09-03 RX ADMIN — Medication 10 MILLIGRAM(S): at 22:19

## 2024-09-03 RX ADMIN — Medication 300 MILLIGRAM(S): at 11:05

## 2024-09-03 RX ADMIN — IPRATROPIUM BROMIDE AND ALBUTEROL SULFATE 3 MILLILITER(S): .5; 3 SOLUTION RESPIRATORY (INHALATION) at 08:49

## 2024-09-03 RX ADMIN — Medication 40 MILLIGRAM(S): at 18:01

## 2024-09-03 RX ADMIN — Medication 1 APPLICATION(S): at 18:01

## 2024-09-03 RX ADMIN — Medication 1 APPLICATION(S): at 06:13

## 2024-09-03 RX ADMIN — IPRATROPIUM BROMIDE AND ALBUTEROL SULFATE 3 MILLILITER(S): .5; 3 SOLUTION RESPIRATORY (INHALATION) at 04:17

## 2024-09-03 RX ADMIN — IPRATROPIUM BROMIDE AND ALBUTEROL SULFATE 3 MILLILITER(S): .5; 3 SOLUTION RESPIRATORY (INHALATION) at 14:45

## 2024-09-03 RX ADMIN — Medication 40 MILLIGRAM(S): at 06:13

## 2024-09-03 RX ADMIN — CHLORHEXIDINE GLUCONATE 1 APPLICATION(S): 40 SOLUTION TOPICAL at 11:05

## 2024-09-03 RX ADMIN — Medication 5000 UNIT(S): at 13:10

## 2024-09-03 NOTE — SWALLOW BEDSIDE ASSESSMENT ADULT - SLP PERTINENT HISTORY OF CURRENT PROBLEM
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  While being evaluated at  pt became unresponsive and had decorticate posturing.  Pt was intubated for airway protection.  CTH was negative for ICH but CTA h/n was concerning for basilar artery occlusion.  Pt was transferred to [facility] neuro ICU 08/21/24.  NeuroIR was consulted and pt underwent a tici 2b thrombectomy and L vert angioplasty on 08/21/24.  CTH s/p procedure finding likely compatible with a developing hemorrhagic conversion of known infarct and/or contrast staining but repeat CTH on 08/27th showed resolution of prior findings that were concerning for hemorrhage therefore suspect was more likely contrast.  Pt was extubated to hfnc 08/30/24, has been weaned down to 3L NC since then and tolerating.  Palliative consulted given poor prognosis.  Pt now DNR/DNI and do not want escalation of care but still deciding on comfort measures.

## 2024-09-03 NOTE — PROGRESS NOTE ADULT - SUBJECTIVE AND OBJECTIVE BOX
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  While being evaluated at  pt became unresponsive and had decorticate posturing.  Pt was intubated for airway protection.  CTH was negative for ICH but CTA h/n was concerning for basilar artery occlusion.  Pt was transferred to [facility] neuro ICU 08/21/24.  NeuroIR was consulted and pt underwent a tici 2b thrombectomy and L vert angioplasty on 08/21/24.  CTH s/p procedure finding likely compatible with a developing hemorrhagic conversion of known infarct and/or contrast staining but repeat CTH on 08/27th showed resolution of prior findings that were concerning for hemorrhage therefore suspect was more likely contrast.  Pt was extubated to hfnc 08/30/24, has been weaned down to 3L NC since then and tolerating.  Palliative consulted given poor prognosis.  Pt now DNR/DNI and do not want escalation of care but still deciding on comfort measures.         SUBJECTIVE / OVERNIGHT EVENTS: No acute events reported overnight.  Unable to obtain hx from pt due to current condition.        I&O's Summary    02 Sep 2024 07:01  -  03 Sep 2024 07:00  --------------------------------------------------------  IN: 1200 mL / OUT: 1395 mL / NET: -195 mL    03 Sep 2024 07:01  -  03 Sep 2024 10:21  --------------------------------------------------------  IN: 150 mL / OUT: 200 mL / NET: -50 mL        PHYSICAL EXAM:  Vital Signs Last 24 Hrs  T(C): 37.1 (03 Sep 2024 10:00), Max: 37.2 (02 Sep 2024 13:00)  T(F): 98.8 (03 Sep 2024 10:00), Max: 99 (02 Sep 2024 13:00)  HR: 84 (03 Sep 2024 10:00) (62 - 89)  BP: 137/95 (03 Sep 2024 10:00) (127/65 - 151/69)  BP(mean): 109 (03 Sep 2024 10:00) (75 - 109)  RR: 21 (03 Sep 2024 10:00) (12 - 23)  SpO2: 99% (03 Sep 2024 10:00) (92% - 100%)    Parameters below as of 03 Sep 2024 08:00  Patient On (Oxygen Delivery Method): nasal cannula  O2 Flow (L/min): 3        GENERAL: pt examined bedside, laying comfortably in bed in NAD  HEENT: NC/AT, dry oral mucosa, clear conjunctiva, sclera nonicteric  RESPIRATORY: poor inspiratory effort, course breath sounds b/l  CARDIOVASCULAR: irregular irregular, normal S1 and S2  ABDOMEN: soft, NT/ND,+bowel sounds, no rebound/guarding  EXTREMITIES: No cynaosis, no clubbing, no lower extremity edema  NEUROLOGY: Awake, tracks, unable to assess orientation, follows basic commands, strength 2/5 LUE, and 0/5 RUE, able to wiggle toes of b/l LE, rt facial droop   SKIN: No rashes or no palpable lesions        LABS:                        8.7    10.67 )-----------( 196      ( 03 Sep 2024 04:52 )             27.4     09-03    142  |  106  |  34.5<H>  ----------------------------<  87  4.5   |  22.0  |  1.32<H>    Ca    8.4      03 Sep 2024 04:52  Phos  3.9     09-03  Mg     2.1     09-03            Urinalysis Basic - ( 03 Sep 2024 04:52 )    Color: x / Appearance: x / SG: x / pH: x  Gluc: 87 mg/dL / Ketone: x  / Bili: x / Urobili: x   Blood: x / Protein: x / Nitrite: x   Leuk Esterase: x / RBC: x / WBC x   Sq Epi: x / Non Sq Epi: x / Bacteria: x        CAPILLARY BLOOD GLUCOSE            RADIOLOGY & ADDITIONAL TESTS:    < from: CT Head No Cont (08.27.24 @ 08:52) >  IMPRESSION: Stable pontine infarct. Stable bilateral cerebellar   hemisphere infarctions. Previously seen hyperdensity in the mikaela has   resolved.    < end of copied text >      < from: MR Head No Cont (08.22.24 @ 13:58) >  IMPRESSION:  Acute bilateral cerebellar and pontine infarcts as described above.   Susceptibility artifact in the left ventral mikaela suspicious for   hemorrhagic conversion for which a component of was seen on the recent   head CT. Additional more diffuse high signal in the mikaela more likely   contrast staining. Continual follow-up recommended.  No large hemorrhage, vasogenic edema or infarct in the supratentorial   brain parenchyma. Chronic lacunar infarcts in the bilateral corona   radiata and moderate severity chronic microvascular changes.    < end of copied text >    < from: CT Head No Cont (08.21.24 @ 15:34) >  IMPRESSION:    1.  Redemonstrated hyperintensity within the mikaela, mildly decreased in   attenuation when compared to prior imaging. Finding thought to represent   contrast staining and/or petechial hemorrhage. Continued follow-up   imaging as clinically indicated.  2.  Evolving infarct in the left cerebellar hemisphere.  3.  Chronic ischemic changes discussed above.    < end of copied text >      < from: TTE W or WO Ultrasound Enhancing Agent (08.21.24 @ 12:49) >  CONCLUSIONS:      1. Left ventricular cavity is normal in size. Left ventricular systolic function is moderately decreased with an ejection fraction of 37 % by Kern's method of disks.   2. Multiple segmental abnormalitiesexist. See findings.   3. The left ventricular diastolic function is indeterminate.   4. Normal right ventricular cavity size and normal right ventricular systolic function.   5. Left atrium is mildly dilated.   6. The right atrium is normal in size.   7. Moderate mitral regurgitation.   8. Structurally normal mitral valve with normal leaflet excursion.   9. Mild tricuspid regurgitation.  10. Estimated pulmonary artery systolic pressure is 34 mmHg.  11. Lipomatous interatrial septal hypertrophy present.  12. Mild left ventricular hypertrophy.  13. There is mild calcification of the mitral valve annulus.  14. There is no evidence of a left ventricular thrombus.    < end of copied text >      MEDICATIONS  (STANDING):  albuterol/ipratropium for Nebulization 3 milliLiter(s) Nebulizer every 6 hours  aspirin Suppository 300 milliGRAM(s) Rectal daily  chlorhexidine 2% Cloths 1 Application(s) Topical daily  heparin   Injectable 5000 Unit(s) SubCutaneous every 8 hours  nystatin Powder 1 Application(s) Topical two times a day  pantoprazole  Injectable 40 milliGRAM(s) IV Push every 12 hours  sodium chloride 0.9%. 1000 milliLiter(s) (50 mL/Hr) IV Continuous <Continuous>    MEDICATIONS  (PRN):  acetylcysteine 20%  Inhalation 4 milliLiter(s) Inhalation every 6 hours PRN Thick secretions  artificial  tears Solution 1 Drop(s) Both EYES every 6 hours PRN Dry Eyes  bisacodyl Suppository 10 milliGRAM(s) Rectal daily PRN Constipation  hydrALAZINE Injectable 10 milliGRAM(s) IV Push every 2 hours PRN SBP>160

## 2024-09-03 NOTE — PROGRESS NOTE ADULT - SUBJECTIVE AND OBJECTIVE BOX
CC:  Follow up GOC , Symptoms    OVERNIGHT EVENTS:  extubated on NC  NPO     Present Symptoms:   Dyspnea:  Yes  Nausea/Vomiting:  No   Anxiety/ Agitation No   Depression:  Unable to assess  Fatigue:  Y  Unable  to assess  Loss of appetite:  NA   Constipation: Not Reported      Pain:  No Signs            Location            Duration            Character            Severity            Factors            Effect    Pain AD Score:  [email or web address] (press ctrl + left click to view)    Review of Systems: Reviewed as above  Further ROS unable   obtain due to poor mentation       MEDICATIONS  (STANDING):  albuterol/ipratropium for Nebulization 3 milliLiter(s) Nebulizer every 6 hours  aspirin Suppository 300 milliGRAM(s) Rectal daily  chlorhexidine 2% Cloths 1 Application(s) Topical daily  heparin   Injectable 5000 Unit(s) SubCutaneous every 8 hours  nystatin Powder 1 Application(s) Topical two times a day  pantoprazole  Injectable 40 milliGRAM(s) IV Push every 12 hours  sodium chloride 0.9%. 1000 milliLiter(s) (50 mL/Hr) IV Continuous <Continuous>    MEDICATIONS  (PRN):  acetylcysteine 20%  Inhalation 4 milliLiter(s) Inhalation every 6 hours PRN Thick secretions  artificial  tears Solution 1 Drop(s) Both EYES every 6 hours PRN Dry Eyes  bisacodyl Suppository 10 milliGRAM(s) Rectal daily PRN Constipation  hydrALAZINE Injectable 10 milliGRAM(s) IV Push every 2 hours PRN SBP>160      PHYSICAL EXAM:    Vital Signs Last 24 Hrs  T(C): 36.8 (03 Sep 2024 12:00), Max: 37.1 (02 Sep 2024 14:00)  T(F): 98.2 (03 Sep 2024 12:00), Max: 98.8 (02 Sep 2024 14:00)  HR: 80 (03 Sep 2024 12:00) (62 - 89)  BP: 143/63 (03 Sep 2024 12:00) (127/65 - 151/69)  BP(mean): 88 (03 Sep 2024 12:00) (79 - 109)  RR: 16 (03 Sep 2024 12:00) (12 - 23)  SpO2: 96% (03 Sep 2024 12:00) (92% - 100%)    Parameters below as of 03 Sep 2024 12:00  Patient On (Oxygen Delivery Method): nasal cannula  O2 Flow (L/min): 3    Karnofsky: 20 %  General:  Elderly man NAD       HEENT:  NCAT        Lungs: little labored  CV:  RR  GI: soft NTND  MSK: weak bb  Skin:  warm/dry  Neuro non responsive  Psych NA    LABS:                          8.7    10.67 )-----------( 196      ( 03 Sep 2024 04:52 )             27.4     09-03    142  |  106  |  34.5<H>  ----------------------------<  87  4.5   |  22.0  |  1.32<H>    Ca    8.4      03 Sep 2024 04:52  Phos  3.9     09-03  Mg     2.1     09-03        Urinalysis Basic - ( 03 Sep 2024 04:52 )    Color: x / Appearance: x / SG: x / pH: x  Gluc: 87 mg/dL / Ketone: x  / Bili: x / Urobili: x   Blood: x / Protein: x / Nitrite: x   Leuk Esterase: x / RBC: x / WBC x   Sq Epi: x / Non Sq Epi: x / Bacteria: x      I&O's Summary    02 Sep 2024 07:01  -  03 Sep 2024 07:00  --------------------------------------------------------  IN: 1200 mL / OUT: 1395 mL / NET: -195 mL    03 Sep 2024 07:01  -  03 Sep 2024 13:47  --------------------------------------------------------  IN: 250 mL / OUT: 260 mL / NET: -10 mL        RADIOLOGY & ADDITIONAL STUDIES:  Imaging Reviewed  ( x  )   < from: Xray Chest 1 View- PORTABLE-Urgent (Xray Chest 1 View- PORTABLE-Urgent .) (09.01.24 @ 05:39) >    ACC: [id number] EXAM:  XR CHEST PORTABLE URGENT 1V   ORDERED BY: VANDANA GARCIA     PROCEDURE DATE:  09/01/2024          INTERPRETATION:  Follow-up.    AP chest. Prior 8/29/2024.    IMPRESSION:  Low lung volumes. Endotracheal tube nasogastric tubehave been removed.   No change heart mediastinum. Right basilar atelectasis similar to prior.   Slight increase in the opacity at the left base may reflect increase in   small left pleural effusion and/or atelectasis. Correlate clinically for   superimposed infection. No pneumothorax or other interval change    --- End of Report ---            JADYN GRANT MD; Attending Radiologist  This document has been electronically signed. Sep  2 2024 10:19AM    < end of copied text >

## 2024-09-03 NOTE — PROGRESS NOTE ADULT - ASSESSMENT
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  Acute resp failure due to neurologic injury.  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  H/o Stage 3 colon cancer in 2009, had surgery; hemorrhoids, h/o LGIB.    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  Acute resp failure due to neurologic injury.  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  H/o Stage 3 colon cancer in 2009, had surgery; hemorrhoids, h/o LGIB.    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  Acute resp failure due to neurologic injury.  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  H/o Stage 3 colon cancer in 2009, had surgery; hemorrhoids, h/o LGIB.    Plan:   - neurochecks q4h  - pain control: tylenol PRN, minimizing sedation  - [place] ASA, will eventually need AC for Afib if alined with GOC  - mobility: ROM in bed   - SBP goal 100-160; held Amlodipine 5mg daily, statin  - continue humidified NC, wean as tolerated  - Aggressive chest PT, Pulm toileting, Mucomyst/alb nebs  - NPO for now, family discussing if NGT within GOC  - sp/sw eval requested per family  - monitor e-lytes, I/Os, Thomas, flomax 0.4mg held while NPO  - LBM 9/2  - maintain -180, ISS  - s/p abx for resistant Proteus in sputum  - DVT prophylaxis: SCD, Chemoppx   - can do every other day labs

## 2024-09-03 NOTE — PROGRESS NOTE ADULT - NS MD NEURO CONDITIONS_ENCEPHAL
Neurological

## 2024-09-03 NOTE — CHART NOTE - NSCHARTNOTEFT_GEN_A_CORE
DOWNGRADE NOTE  Accepting Physician:  Service:  Level of Care:      Hospital Course:  HPI:  89 Yo M with PMH of HTN, colon cancer, enlarged prostate transfer from [facility] from [facility] for altered mental status. LKW 7PM, Per chart, Patient initially complained of  Nausea,  vomiting and dizziness s/p dinner, later  found to be unresponsive and decorticate posturing in [place] ED. 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 Patient  transferred to [facility] for NeuroIR intervention. had tici 3 thrombectomy and L vert angioplasty. Patient was extubated to [facility], and is currently tolerating but is DNR/DNI. Family still deciding on comfort measures, would like SLP today 9/3, continue GOC as needed.      Vital Signs Last 24 Hrs  T(C): 36.5 (03 Sep 2024 08:00), Max: 37.2 (02 Sep 2024 13:00)  T(F): 97.7 (03 Sep 2024 08:00), Max: 99 (02 Sep 2024 13:00)  HR: 83 (03 Sep 2024 08:00) (62 - 89)  BP: 141/59 (03 Sep 2024 08:00) (127/65 - 151/61)  BP(mean): 84 (03 Sep 2024 08:00) (72 - 97)  RR: 13 (03 Sep 2024 08:00) (10 - 23)  SpO2: 97% (03 Sep 2024 08:00) (92% - 100%)    Parameters below as of 03 Sep 2024 08:00  Patient On (Oxygen Delivery Method): nasal cannula  O2 Flow (L/min): 3    I&O's Detail    02 Sep 2024 07:01  -  03 Sep 2024 07:00  --------------------------------------------------------  IN:    sodium chloride 0.9%: 1150 mL  Total IN: 1150 mL    OUT:    Indwelling Catheter - Urethral (mL): 1345 mL  Total OUT: 1345 mL    Total NET: -195 mL        I&O's Summary    02 Sep 2024 07:01  -  03 Sep 2024 07:00  --------------------------------------------------------  IN: 1150 mL / OUT: 1345 mL / NET: -195 mL        PHYSICAL EXAM:  General: Calm  HEENT: MMM  Neuro:  -Mental status- No acute distress, EO to stim, FC on L  -CN- PERRL 3mm, EOMI, R facial droop  R WD  LUE AG shows 2 fingers  LLE AG    CV: RRR  Pulm: clear to auscultation  Abd: Soft, nontender, nondistended  Ext: no noted edema in lower ext  Skin: warm, dry        DIET:  [] Regular, CCD, DASH  [x] NPO  [] Mechanical  [] Tube feeds    LABS:                        8.7    10.67 )-----------( 196      ( 03 Sep 2024 04:52 )             27.4     09-03    142  |  106  |  34.5<H>  ----------------------------<  87  4.5   |  22.0  |  1.32<H>    Ca    8.4      03 Sep 2024 04:52  Phos  3.9     09-03  Mg     2.1     09-03        Urinalysis Basic - ( 03 Sep 2024 04:52 )    Color: x / Appearance: x / SG: x / pH: x  Gluc: 87 mg/dL / Ketone: x  / Bili: x / Urobili: x   Blood: x / Protein: x / Nitrite: x   Leuk Esterase: x / RBC: x / WBC x   Sq Epi: x / Non Sq Epi: x / Bacteria: x          CAPILLARY BLOOD GLUCOSE          Drug Levels: [] N/A    CSF Analysis: [] N/A      Allergies    Allergy Status Unknown    Intolerances      MEDICATIONS:  Antibiotics:    Neuro:  aspirin Suppository 300 milliGRAM(s) Rectal daily    Anticoagulation:  heparin   Injectable 5000 Unit(s) SubCutaneous every 8 hours    OTHER:  acetylcysteine 20%  Inhalation 4 milliLiter(s) Inhalation every 6 hours PRN  albuterol/ipratropium for Nebulization 3 milliLiter(s) Nebulizer every 6 hours  artificial  tears Solution 1 Drop(s) Both EYES every 6 hours PRN  bisacodyl Suppository 10 milliGRAM(s) Rectal daily PRN  chlorhexidine 2% Cloths 1 Application(s) Topical daily  hydrALAZINE Injectable 10 milliGRAM(s) IV Push every 2 hours PRN  nystatin Powder 1 Application(s) Topical two times a day  pantoprazole  Injectable 40 milliGRAM(s) IV Push every 12 hours    IVF:  sodium chloride 0.9%. 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  Acute resp failure due to neurologic injury.  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  H/o Stage 3 colon cancer in 2009, had surgery; hemorrhoids, h/o LGIB.    Plan:   - neurochecks q4h  - pain control: tylenol PRN, minimizing sedation  - CA ASA, will eventually need AC for Afib if aligned with GOC  - mobility: ROM in bed   - SBP goal 100-160; cont Amlodipine 5mg daily, statin  - continue NC, wean as tolerated  - Mucomyst/alb nebs  - NPO for now, family discussing if NGT within GOC  - monitor e-lytes, I/Os, Thomas, flomax 0.4mg held while NPO  - LBM 9/2  - maintain -180, ISS  - s/p abx for resistant Proteus  - DVT prophylaxis: SCD, Chemoppx; iron supplements DOWNGRADE NOTE  Accepting Physician:  Service:  Level of Care:      Hospital Course:  HPI:  89 Yo M with PMH of HTN, colon cancer, enlarged prostate transfer from [facility] from [facility] for altered mental status. LKW 7PM, Per chart, Patient initially complained of  Nausea,  vomiting and dizziness s/p dinner, later  found to be unresponsive and decorticate posturing in [place] ED. 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 Patient  transferred to [facility] for NeuroIR intervention. had tici 2b thrombectomy and L vert angioplasty. Patient was extubated to [facility], and is currently tolerating but is DNR/DNI. Family still deciding on comfort measures, would like SLP today 9/3, continue GOC as needed.      Vital Signs Last 24 Hrs  T(C): 36.5 (03 Sep 2024 08:00), Max: 37.2 (02 Sep 2024 13:00)  T(F): 97.7 (03 Sep 2024 08:00), Max: 99 (02 Sep 2024 13:00)  HR: 83 (03 Sep 2024 08:00) (62 - 89)  BP: 141/59 (03 Sep 2024 08:00) (127/65 - 151/61)  BP(mean): 84 (03 Sep 2024 08:00) (72 - 97)  RR: 13 (03 Sep 2024 08:00) (10 - 23)  SpO2: 97% (03 Sep 2024 08:00) (92% - 100%)    Parameters below as of 03 Sep 2024 08:00  Patient On (Oxygen Delivery Method): nasal cannula  O2 Flow (L/min): 3    I&O's Detail    02 Sep 2024 07:01  -  03 Sep 2024 07:00  --------------------------------------------------------  IN:    sodium chloride 0.9%: 1150 mL  Total IN: 1150 mL    OUT:    Indwelling Catheter - Urethral (mL): 1345 mL  Total OUT: 1345 mL    Total NET: -195 mL        I&O's Summary    02 Sep 2024 07:01  -  03 Sep 2024 07:00  --------------------------------------------------------  IN: 1150 mL / OUT: 1345 mL / NET: -195 mL        PHYSICAL EXAM:  General: Calm  HEENT: MMM  Neuro:  -Mental status- No acute distress, EO to stim, FC on L  -CN- PERRL 3mm, EOMI, R facial droop  R WD  LUE AG shows 2 fingers  LLE AG    CV: RRR  Pulm: clear to auscultation  Abd: Soft, nontender, nondistended  Ext: no noted edema in lower ext  Skin: warm, dry        DIET:  [] Regular, CCD, DASH  [x] NPO  [] Mechanical  [] Tube feeds    LABS:                        8.7    10.67 )-----------( 196      ( 03 Sep 2024 04:52 )             27.4     09-03    142  |  106  |  34.5<H>  ----------------------------<  87  4.5   |  22.0  |  1.32<H>    Ca    8.4      03 Sep 2024 04:52  Phos  3.9     09-03  Mg     2.1     09-03        Urinalysis Basic - ( 03 Sep 2024 04:52 )    Color: x / Appearance: x / SG: x / pH: x  Gluc: 87 mg/dL / Ketone: x  / Bili: x / Urobili: x   Blood: x / Protein: x / Nitrite: x   Leuk Esterase: x / RBC: x / WBC x   Sq Epi: x / Non Sq Epi: x / Bacteria: x          CAPILLARY BLOOD GLUCOSE          Drug Levels: [] N/A    CSF Analysis: [] N/A      Allergies    Allergy Status Unknown    Intolerances      MEDICATIONS:  Antibiotics:    Neuro:  aspirin Suppository 300 milliGRAM(s) Rectal daily    Anticoagulation:  heparin   Injectable 5000 Unit(s) SubCutaneous every 8 hours    OTHER:  acetylcysteine 20%  Inhalation 4 milliLiter(s) Inhalation every 6 hours PRN  albuterol/ipratropium for Nebulization 3 milliLiter(s) Nebulizer every 6 hours  artificial  tears Solution 1 Drop(s) Both EYES every 6 hours PRN  bisacodyl Suppository 10 milliGRAM(s) Rectal daily PRN  chlorhexidine 2% Cloths 1 Application(s) Topical daily  hydrALAZINE Injectable 10 milliGRAM(s) IV Push every 2 hours PRN  nystatin Powder 1 Application(s) Topical two times a day  pantoprazole  Injectable 40 milliGRAM(s) IV Push every 12 hours    IVF:  sodium chloride 0.9%. 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  Acute resp failure due to neurologic injury.  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  H/o Stage 3 colon cancer in 2009, had surgery; hemorrhoids, h/o LGIB.    Plan:   - neurochecks q4h  - pain control: tylenol PRN, minimizing sedation  - VA ASA, will eventually need AC for Afib if aligned with GOC  - mobility: ROM in bed   - SBP goal 100-160; cont Amlodipine 5mg daily, statin  - continue NC, wean as tolerated  - Mucomyst/alb nebs  - NPO for now, family discussing if NGT within GOC  - monitor e-lytes, I/Os, Thomas, flomax 0.4mg held while NPO  - LBM 9/2  - maintain -180, ISS  - s/p abx for resistant Proteus  - DVT prophylaxis: SCD, Chemoppx; iron supplements DOWNGRADE NOTE  Accepting Physician: Dr. Holley  Service: Medicine      Hospital Course:  HPI:  89 Yo M with PMH of HTN, colon cancer, enlarged prostate transfer from [facility] from [facility] for altered mental status. LKW 7PM, Per chart, Patient initially complained of  Nausea,  vomiting and dizziness s/p dinner, later  found to be unresponsive and decorticate posturing in [place] ED. 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 Patient  transferred to [facility] for NeuroIR intervention. had tici 2b thrombectomy and L vert angioplasty. Patient was extubated to [facility], and is currently tolerating but is DNR/DNI. Family still deciding on comfort measures, would like SLP today 9/3, continue GOC as needed.      Vital Signs Last 24 Hrs  T(C): 36.5 (03 Sep 2024 08:00), Max: 37.2 (02 Sep 2024 13:00)  T(F): 97.7 (03 Sep 2024 08:00), Max: 99 (02 Sep 2024 13:00)  HR: 83 (03 Sep 2024 08:00) (62 - 89)  BP: 141/59 (03 Sep 2024 08:00) (127/65 - 151/61)  BP(mean): 84 (03 Sep 2024 08:00) (72 - 97)  RR: 13 (03 Sep 2024 08:00) (10 - 23)  SpO2: 97% (03 Sep 2024 08:00) (92% - 100%)    Parameters below as of 03 Sep 2024 08:00  Patient On (Oxygen Delivery Method): nasal cannula  O2 Flow (L/min): 3    I&O's Detail    02 Sep 2024 07:01  -  03 Sep 2024 07:00  --------------------------------------------------------  IN:    sodium chloride 0.9%: 1150 mL  Total IN: 1150 mL    OUT:    Indwelling Catheter - Urethral (mL): 1345 mL  Total OUT: 1345 mL    Total NET: -195 mL        I&O's Summary    02 Sep 2024 07:01  -  03 Sep 2024 07:00  --------------------------------------------------------  IN: 1150 mL / OUT: 1345 mL / NET: -195 mL        PHYSICAL EXAM:  General: Calm  HEENT: MMM  Neuro:  -Mental status- No acute distress, EO to stim, FC on L  -CN- PERRL 3mm, EOMI, R facial droop  R WD  LUE AG shows 2 fingers  LLE AG    CV: RRR  Pulm: clear to auscultation  Abd: Soft, nontender, nondistended  Ext: no noted edema in lower ext  Skin: warm, dry        DIET:  [] Regular, CCD, DASH  [x] NPO  [] Mechanical  [] Tube feeds    LABS:                        8.7    10.67 )-----------( 196      ( 03 Sep 2024 04:52 )             27.4     09-03    142  |  106  |  34.5<H>  ----------------------------<  87  4.5   |  22.0  |  1.32<H>    Ca    8.4      03 Sep 2024 04:52  Phos  3.9     09-03  Mg     2.1     09-03        Urinalysis Basic - ( 03 Sep 2024 04:52 )    Color: x / Appearance: x / SG: x / pH: x  Gluc: 87 mg/dL / Ketone: x  / Bili: x / Urobili: x   Blood: x / Protein: x / Nitrite: x   Leuk Esterase: x / RBC: x / WBC x   Sq Epi: x / Non Sq Epi: x / Bacteria: x          CAPILLARY BLOOD GLUCOSE          Drug Levels: [] N/A    CSF Analysis: [] N/A      Allergies    Allergy Status Unknown    Intolerances      MEDICATIONS:  Antibiotics:    Neuro:  aspirin Suppository 300 milliGRAM(s) Rectal daily    Anticoagulation:  heparin   Injectable 5000 Unit(s) SubCutaneous every 8 hours    OTHER:  acetylcysteine 20%  Inhalation 4 milliLiter(s) Inhalation every 6 hours PRN  albuterol/ipratropium for Nebulization 3 milliLiter(s) Nebulizer every 6 hours  artificial  tears Solution 1 Drop(s) Both EYES every 6 hours PRN  bisacodyl Suppository 10 milliGRAM(s) Rectal daily PRN  chlorhexidine 2% Cloths 1 Application(s) Topical daily  hydrALAZINE Injectable 10 milliGRAM(s) IV Push every 2 hours PRN  nystatin Powder 1 Application(s) Topical two times a day  pantoprazole  Injectable 40 milliGRAM(s) IV Push every 12 hours    IVF:  sodium chloride 0.9%. 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  Acute resp failure due to neurologic injury.  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  H/o Stage 3 colon cancer in 2009, had surgery; hemorrhoids, h/o LGIB.    Plan:   - neurochecks q4h  - pain control: tylenol PRN, minimizing sedation  - SD ASA, will eventually need AC for Afib if aligned with GOC  - mobility: ROM in bed   - SBP goal 100-160; cont Amlodipine 5mg daily, statin  - continue NC, wean as tolerated  - Mucomyst/alb nebs  - NPO for now, family discussing if NGT within GOC  - monitor e-lytes, I/Os, Thomas, flomax 0.4mg held while NPO  - LBM 9/2  - maintain -180, ISS  - s/p abx for resistant Proteus  - DVT prophylaxis: SCD, Chemoppx; iron supplements

## 2024-09-03 NOTE — PROGRESS NOTE ADULT - THIS PATIENT HAS THE FOLLOWING CONDITION(S)/DIAGNOSES ON THIS ADMISSION:
Encephalopathy
Encephalopathy/Acute Respiratory Failure
Encephalopathy
None
None
Encephalopathy
Encephalopathy
Encephalopathy/Acute Respiratory Failure
Encephalopathy/Acute Respiratory Failure
None
Encephalopathy

## 2024-09-03 NOTE — PROGRESS NOTE ADULT - SUBJECTIVE AND OBJECTIVE BOX
Preliminary note, offical recommendations pending attending review/signature   [facility] Stroke Team  Progress Note     HPI:  89 Yo M with PMH of HTN, colon cancer, afib(diagnosed in 2022 and refused anticoagulation), and enlarged prostate was transferred from [facility] to [facility] on 8/21. Per chart patient initially complained of nausea,  vomiting and dizziness s/p dinner on 8/20. Was brought to [place] on 8/20 and was found to be unresponsive, apneic breathing, and decorticate posturing in [place] ED. Patient intubated in ED. LKW was 20:00 on 8/20. As per chart review son stated that about 4 months ago patient had a syncopal episode and received TPA and had some hematuria, but eventual workup showed no evidence of stroke. 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 Patient  transferred to [facility] for NeuroIR intervention and taken for mechanical thrombectomy on 8/21. Stroke team called for consult.    SUBJECTIVE: No events overnight.  No new neurologic complaints.  ROS reported negative unless otherwise noted.    MEDICATIONS:  acetylcysteine 20%  Inhalation 4 milliLiter(s) Inhalation every 6 hours PRN  albuterol/ipratropium for Nebulization 3 milliLiter(s) Nebulizer every 6 hours  artificial  tears Solution 1 Drop(s) Both EYES every 6 hours PRN  aspirin Suppository 300 milliGRAM(s) Rectal daily  bisacodyl Suppository 10 milliGRAM(s) Rectal daily PRN  chlorhexidine 2% Cloths 1 Application(s) Topical daily  heparin   Injectable 5000 Unit(s) SubCutaneous every 8 hours  hydrALAZINE Injectable 10 milliGRAM(s) IV Push every 2 hours PRN  nystatin Powder 1 Application(s) Topical two times a day  pantoprazole  Injectable 40 milliGRAM(s) IV Push every 12 hours  sodium chloride 0.9%. 1000 milliLiter(s) IV Continuous <Continuous>      Vital Signs Last 24 Hrs  T(C): 37.1 (03 Sep 2024 10:00), Max: 37.2 (02 Sep 2024 13:00)  T(F): 98.8 (03 Sep 2024 10:00), Max: 99 (02 Sep 2024 13:00)  HR: 84 (03 Sep 2024 10:00) (62 - 89)  BP: 137/95 (03 Sep 2024 10:00) (127/65 - 151/69)  BP(mean): 109 (03 Sep 2024 10:00) (75 - 109)  RR: 21 (03 Sep 2024 10:00) (12 - 23)  SpO2: 99% (03 Sep 2024 10:00) (92% - 100%)    Parameters below as of 03 Sep 2024 08:00  Patient On (Oxygen Delivery Method): nasal cannula  O2 Flow (L/min): 3      PHYSICAL EXAM:  General: No acute distress, tachypneic     NEUROLOGICAL EXAM:  Mental status: Somnolent, opens eyes to noxious stimuli. No verbal output, does not follow commands, Right-sided neglect.   Cranial Nerves: Right facial palsy. Eyes midline, does not track or follow. Unable to assess dysarthria or tongue alignment.     Motor exam:   0/5 RUE  1/5 RLE  3/5 LUE, moves spontaneously  3/5 LLE, moves spontaneously    Sensation: Intact in all four extremities, localizes pain     Coordination/ Gait: Unable to assess for dysmetria, gait not tested      LABS:                        8.7    10.67 )-----------( 196      ( 03 Sep 2024 04:52 )             27.4    09-03    142  |  106  |  34.5<H>  ----------------------------<  87  4.5   |  22.0  |  1.32<H>    Ca    8.4      03 Sep 2024 04:52  Phos  3.9     09-03  Mg     2.1     09-03      A1C 5.4      RADIOLOGY & ADDITIONAL STUDIES:    CT Head No Cont (08.27.24 @ 08:52)  IMPRESSION: Stable pontine infarct. Stable bilateral cerebellar   hemisphere infarctions. Previously seen hyperdensity in the mikaela has   resolved.    MR Head No Cont (08.22.24 @ 13:58)  IMPRESSION:  Acute bilateral cerebellar and pontine infarcts as described above.   Susceptibility artifact in the left ventral mikaela suspicious for   hemorrhagic conversion for which a component of was seen on the recent   head CT. Additional more diffuse high signal in the mikaela more likely   contrast staining. Continual follow-up recommended.  No large hemorrhage, vasogenic edema or infarct in the supratentorial   brain parenchyma. Chronic lacunar infarcts in the bilateral corona   radiata and moderate severity chronic microvascular changes.    TTE W or WO Ultrasound Enhancing Agent (08.21.24 @ 12:49)   CONCLUSIONS:   1. Left ventricular cavity is normal in size. Left ventricular systolic function is moderately decreased with an ejection fraction of 37 % by Kern's method of disks.   2. Multiple segmental abnormalitiesexist. See findings.   3. The left ventricular diastolic function is indeterminate.   4. Normal right ventricular cavity size and normal right ventricular systolic function.   5. Left atrium is mildly dilated.   6. The right atrium is normal in size.   7. Moderate mitral regurgitation.   8. Structurally normal mitral valve with normal leaflet excursion.   9. Mild tricuspid regurgitation.  10. Estimated pulmonary artery systolic pressure is 34 mmHg.  11. Lipomatous interatrial septal hypertrophy present.  12. Mild left ventricular hypertrophy.  13. There is mild calcification of the mitral valve annulus.  14. There is no evidence of a left ventricular thrombus.    CT Head No Cont (08.21.24 @ 15:34)  IMPRESSION:  1.  Redemonstrated hyperintensity within the mikaela, mildly decreased in   attenuation when compared to prior imaging. Finding thought to represent   contrast staining and/or petechial hemorrhage. Continued follow-up   imaging as clinically indicated.  2.  Evolving infarct in the left cerebellar hemisphere.  3.  Chronic ischemic changes discussed above.    CT Head No Cont (08.21.24 @ 08:09)  IMPRESSION:  Increased attenuation within the mikaela, new compared to prior imaging.   Finding likely compatible with a developing hemorrhagic conversion of   patient's known infarct and/or contrast staining. Recommend close   interval imaging for further evaluation.    Head CT 8/20:  IMPRESSION:  No evidence of acute intracranial hemorrhage, midline shift or CT evidence  of acute territorial infarct.    CTA Head and Neck and CTP:   IMPRESSION:  CT PERFUSION: Perfusion imaging predicts a core infarct of 0 cc within the  bilateral cerebral hemispheres.? Based on the perfusion mismatch, there is a  predicted volume of 61 cc penumbra of tissue at risk.    These findings are nonspecific and may be artifactual in nature. MRI  suggested for further evaluation.    CTA COW: Lack of opacification of the mid to distal basilar artery, this  may be due to poor contrast opacification, however, suspicious for  high-grade stenosis/occlusion.    CTA NECK: Calcified plaque. Patent, ECAs, ICAs, no hemodynamically  significant stenosis at ICA origins by NASCET criteria.    Lack of opacification of the proximal left vertebral artery, this may be due  to poor contrast opacification, however, high-grade stenosis/occlusion is  not excluded.    Endotracheal tube with its distal tip above the level of the judson.  Nonspecific thickening of the esophagus.   [facility] Stroke Team  Progress Note     HPI:  89 Yo M with PMH of HTN, colon cancer, afib(diagnosed in 2022 and refused anticoagulation), and enlarged prostate was transferred from [facility] to [facility] on 8/21. Per chart patient initially complained of nausea,  vomiting and dizziness s/p dinner on 8/20. Was brought to [place] on 8/20 and was found to be unresponsive, apneic breathing, and decorticate posturing in [place] ED. Patient intubated in ED. LKW was 20:00 on 8/20. As per chart review son stated that about 4 months ago patient had a syncopal episode and received TPA and had some hematuria, but eventual workup showed no evidence of stroke. 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 Patient  transferred to [facility] for NeuroIR intervention and taken for mechanical thrombectomy on 8/21. Stroke team called for consult.    SUBJECTIVE: No events overnight.  No new neurologic complaints.  ROS reported negative unless otherwise noted.    MEDICATIONS:  acetylcysteine 20%  Inhalation 4 milliLiter(s) Inhalation every 6 hours PRN  albuterol/ipratropium for Nebulization 3 milliLiter(s) Nebulizer every 6 hours  artificial  tears Solution 1 Drop(s) Both EYES every 6 hours PRN  aspirin Suppository 300 milliGRAM(s) Rectal daily  bisacodyl Suppository 10 milliGRAM(s) Rectal daily PRN  chlorhexidine 2% Cloths 1 Application(s) Topical daily  heparin   Injectable 5000 Unit(s) SubCutaneous every 8 hours  hydrALAZINE Injectable 10 milliGRAM(s) IV Push every 2 hours PRN  nystatin Powder 1 Application(s) Topical two times a day  pantoprazole  Injectable 40 milliGRAM(s) IV Push every 12 hours  sodium chloride 0.9%. 1000 milliLiter(s) IV Continuous <Continuous>      Vital Signs Last 24 Hrs  T(C): 37.1 (03 Sep 2024 10:00), Max: 37.2 (02 Sep 2024 13:00)  T(F): 98.8 (03 Sep 2024 10:00), Max: 99 (02 Sep 2024 13:00)  HR: 84 (03 Sep 2024 10:00) (62 - 89)  BP: 137/95 (03 Sep 2024 10:00) (127/65 - 151/69)  BP(mean): 109 (03 Sep 2024 10:00) (75 - 109)  RR: 21 (03 Sep 2024 10:00) (12 - 23)  SpO2: 99% (03 Sep 2024 10:00) (92% - 100%)    Parameters below as of 03 Sep 2024 08:00  Patient On (Oxygen Delivery Method): nasal cannula  O2 Flow (L/min): 3      PHYSICAL EXAM:  General: No acute distress, tachypneic     NEUROLOGICAL EXAM:  Mental status: Somnolent, opens eyes to noxious stimuli. No verbal output, does not follow commands, Right-sided neglect.   Cranial Nerves: Right facial palsy. Eyes midline, does not track or follow. Unable to assess dysarthria or tongue alignment.     Motor exam:   0/5 RUE  1/5 RLE  3/5 LUE, moves spontaneously  3/5 LLE, moves spontaneously    Sensation: Intact in all four extremities, localizes pain     Coordination/ Gait: Unable to assess for dysmetria, gait not tested      LABS:                        8.7    10.67 )-----------( 196      ( 03 Sep 2024 04:52 )             27.4    09-03    142  |  106  |  34.5<H>  ----------------------------<  87  4.5   |  22.0  |  1.32<H>    Ca    8.4      03 Sep 2024 04:52  Phos  3.9     09-03  Mg     2.1     09-03      A1C 5.4      RADIOLOGY & ADDITIONAL STUDIES:    CT Head No Cont (08.27.24 @ 08:52)  IMPRESSION: Stable pontine infarct. Stable bilateral cerebellar   hemisphere infarctions. Previously seen hyperdensity in the mikaela has   resolved.    MR Head No Cont (08.22.24 @ 13:58)  IMPRESSION:  Acute bilateral cerebellar and pontine infarcts as described above.   Susceptibility artifact in the left ventral mikaela suspicious for   hemorrhagic conversion for which a component of was seen on the recent   head CT. Additional more diffuse high signal in the mikaela more likely   contrast staining. Continual follow-up recommended.  No large hemorrhage, vasogenic edema or infarct in the supratentorial   brain parenchyma. Chronic lacunar infarcts in the bilateral corona   radiata and moderate severity chronic microvascular changes.    TTE W or WO Ultrasound Enhancing Agent (08.21.24 @ 12:49)   CONCLUSIONS:   1. Left ventricular cavity is normal in size. Left ventricular systolic function is moderately decreased with an ejection fraction of 37 % by Kern's method of disks.   2. Multiple segmental abnormalitiesexist. See findings.   3. The left ventricular diastolic function is indeterminate.   4. Normal right ventricular cavity size and normal right ventricular systolic function.   5. Left atrium is mildly dilated.   6. The right atrium is normal in size.   7. Moderate mitral regurgitation.   8. Structurally normal mitral valve with normal leaflet excursion.   9. Mild tricuspid regurgitation.  10. Estimated pulmonary artery systolic pressure is 34 mmHg.  11. Lipomatous interatrial septal hypertrophy present.  12. Mild left ventricular hypertrophy.  13. There is mild calcification of the mitral valve annulus.  14. There is no evidence of a left ventricular thrombus.    CT Head No Cont (08.21.24 @ 15:34)  IMPRESSION:  1.  Redemonstrated hyperintensity within the mikaela, mildly decreased in   attenuation when compared to prior imaging. Finding thought to represent   contrast staining and/or petechial hemorrhage. Continued follow-up   imaging as clinically indicated.  2.  Evolving infarct in the left cerebellar hemisphere.  3.  Chronic ischemic changes discussed above.    CT Head No Cont (08.21.24 @ 08:09)  IMPRESSION:  Increased attenuation within the mikaela, new compared to prior imaging.   Finding likely compatible with a developing hemorrhagic conversion of   patient's known infarct and/or contrast staining. Recommend close   interval imaging for further evaluation.    Head CT 8/20:  IMPRESSION:  No evidence of acute intracranial hemorrhage, midline shift or CT evidence  of acute territorial infarct.    CTA Head and Neck and CTP:   IMPRESSION:  CT PERFUSION: Perfusion imaging predicts a core infarct of 0 cc within the  bilateral cerebral hemispheres.? Based on the perfusion mismatch, there is a  predicted volume of 61 cc penumbra of tissue at risk.    These findings are nonspecific and may be artifactual in nature. MRI  suggested for further evaluation.    CTA COW: Lack of opacification of the mid to distal basilar artery, this  may be due to poor contrast opacification, however, suspicious for  high-grade stenosis/occlusion.    CTA NECK: Calcified plaque. Patent, ECAs, ICAs, no hemodynamically  significant stenosis at ICA origins by NASCET criteria.    Lack of opacification of the proximal left vertebral artery, this may be due  to poor contrast opacification, however, high-grade stenosis/occlusion is  not excluded.    Endotracheal tube with its distal tip above the level of the judson.  Nonspecific thickening of the esophagus.

## 2024-09-03 NOTE — PROGRESS NOTE ADULT - ASSESSMENT
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  While being evaluated at  pt became unresponsive and had decorticate posturing.  Pt was intubated for airway protection.  CTH was negative for ICH but CTA h/n was concerning for basilar artery occlusion.  Pt was transferred to [facility] neuro ICU 08/21/24.  NeuroIR was consulted and pt underwent a tici 2b thrombectomy and L vert angioplasty on 08/21/24.  CTH s/p procedure finding likely compatible with a developing hemorrhagic conversion of known infarct and/or contrast staining but repeat CTH on 08/27th showed resolution of prior findings that were concerning for hemorrhage therefore suspect was more likely contrast.  Pt was extubated to [facility] 08/30/24, has been weaned down to 3L NC since then and tolerating.  Palliative consulted given poor prognosis.  Pt now DNR/DNI and do not want escalation of care but still deciding on comfort measures.       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 Stable bilateral cerebellar hemisphere infarctions and previously seen hyperdensity in the mikaela has resolved therefore suspect was more likely contrast  - A1c 5.4 and 109  - LDL goal <70 but statin held as pt is NPO   - TTE on 08/21/24 showed LVEF 37%, multiple WMAs, mild TR, mod MR   - c/w ASA suppository   - Aggressive chest PT and prn Mucomyst/alb nebs  - Aspiration precautions including HOB elevation   - NPO due to concern for dysphagia/aspiration and SLP consulted   - Goal -160 as per neuro ICU recs  - c/w VS and neuro checks q4h  - Neuro stroke consulted      Dysphagia due to recent CVA  - Remains NPO given concern for ongoing aspiration   - Family does not want NGT as per discussion w/ ICU   - Pt in my medical opinion is a poor candidate for PEG and peg will not lower risk of aspiration   - Leukocytosis today likely from ongoing aspiration pneumonitis  - SLP consulted to evaluate   - Ongoing GOC discussions to be had w/ family   - Palliative following       Leukocytosis suspect due to ongoing aspiration   - s/p course of ertepenem for ESBL proteus PNA  - Stable VS and remains Afebrile   - Keep NPO and c/w aspiration precautions   - Monitor CBC and temperature        Normocytic anemia   - Likely of chronic dx   - Hemodynamically stable and no active bleeding reported   - Baseline Hb unknown but has ranged from 7.6-9.5 on this admission   - Currently H/H remains stable   - Monitor CBC and transfuse for Hb<8 if famly agreeable       HARINDER vs CKD  - Baseline Cr unknown   - Cr during admission has ranged from 1.32 - 2.27  - Has good urine output   - Has dolan but given flomax on hold due to npo will hold off on TOV for now  - Monitor I/O's, renal fxn and lytes  - Avoid nephrotoxic meds or renally dose if needed      AF   - Currently rate controlled   - AC on hold pending further GOC discussions   - Monitor on telemetry        s/p ESBL Proteus PNA  - Sputum cultures grew ESBL proteus in ETT cultures on 08/25  - Completed full course of ertapenem   - Per ID pt is probably a colonizer   - Bcxs NGTD       New HFrEF   - Clinically euvolemic at this time   - Previous EF in 2022 was 50%   - TTE on 08/21/24 showed LVEF 37%, multiple WMAs, mild TR, mod MR   - TSH normal   - GDMT differed by cardio due to pt being NPO and HARINDER and BB differed due to pt having periods of bradycardia   - Ischemic workup deferred by cardiolgy due to current medical condition   - SBP goal per neuro icu recs is 100-160   - Monitor daily weight and I/O's  - Monitor on telemetry       HTN  - Avoid CCBs in setting of rEF  - IV prn antihypertensives ordered as pt is NPO due to concern for dysphagia/aspiration        VTE ppx: SCDs and heparin sq     Dispo:  Ongoing GOC discussions w/ family to determine course of management.   Prognosis guarded.

## 2024-09-03 NOTE — PROGRESS NOTE ADULT - ASSESSMENT
ASSESSMENT: 89 Yo M with PMH of HTN, colon cancer, afib(diagnosed in 2022 and refused anticoagulation), and enlarged prostate was transferred from [facility] to [facility] on 8/21. Per chart patient initially complained of nausea,  vomiting and dizziness s/p dinner on 8/20. Was brought to [place] on 8/20 and was found to be unresponsive, apneic breathing, and decorticate posturing in [place] ED. Patient intubated in ED. LKW was 20:00 on 8/20. As per chart review son stated that about 4 months ago patient had a syncopal episode and received TPA and had some hematuria, but eventual workup showed no evidence of stroke. 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 high grade stenosis. Also noted high grade stenosis vs. occlusion of the proximal left vertebral artery. Patient transferred to [facility] for NeuroIR intervention and taken for mechanical thrombectomy on 8/21. TICI 2B.    MRI brain revealed acute bilateral cerebellar and pontine infarcts. Etiology at this time with competing mechanisms. Appears large artery embolism, however patient with hx of atrial fibrillation and not on anticoagulation which could be contributory as well.     Patient extubated on 8/30/24, currently tachypneic on humidified supplemental O2.   Palliative consulted given poor prognosis. Pt now DNR/DNI, family has expressed they do not want escalation of care but still deciding on comfort measures.   Pending further [facility] conversations with patient's family, as wishes regarding enteral access/nutritional support have not yet been determined.     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 without acute change.   -MRI brain as noted. More diffuse high signal in the mikaela more likely contrast staining  -Continue close monitoring for neurologic deterioration    - Stroke neuro checks q 1 hour as per neuro ICU  -SBP goal 100-160   -ANTITHROMBOTIC THERAPY: due to MRI brain results patient started on ASA 81mg daily. patient with hx of atrial fibrillation. did refuse anticoagulation in the past, however dependent on clinical course and goals of care would obtain head CT on 8/26 to determine timeline for anticoagulation if applicable.   -titrate statin to LDL goal less than 70. . Would initiate high intensity statin when dysphagia screen is passed   -Dysphagia screen: fail. NGT placed.   -Physical therapy/OT/Speech eval/treatment.   -TTE as noted with EF of 37%.  cardiac monitoring w/ telemetry for now, further evaluation pending findings of noted workup  , +/- ILR             - patient should have all age and risk appropriate malignancy screenings with PCP or sooner if clinically suspected    -DVT ppx: Heparin s.c [x] LMWH [] SCD[] - chemoppx initiated in setting of mri brain findings   -maintain adequate hydration    -Na Goal: 135-145   -monitor for si/sx of infection   -Stroke education  -Further care per primary team   -Would suggest GOC conversation with family.     OTHER:  condition and plan of care d/w patient, questions and concerns addressed.     DISPOSITION: Rehab or home depending on PT eval once stable and workup is complete      CORE MEASURES:        Admission NIHSS: 31     Tenecteplase : [] YES [x] NO      LDL/A1C: 109/5.4     Depression Screen- if depression hx and/or present      Statin Therapy: as noted      Dysphagia Screen: [] PASS [] FAIL- pending      Smoking [] YES [] NO      Afib [] YES [] NO     Stroke Education [] YES [] NO- pending    INCOMPLETE/PENDING  ASSESSMENT: 89 Yo M with PMH of HTN, colon cancer, afib(diagnosed in 2022 and refused anticoagulation), and enlarged prostate was transferred from [facility] to [facility] on 8/21. Per chart patient initially complained of nausea,  vomiting and dizziness s/p dinner on 8/20. Was brought to [place] on 8/20 and was found to be unresponsive, apneic breathing, and decorticate posturing in [place] ED. Patient intubated in ED. LKW was 20:00 on 8/20. As per chart review son stated that about 4 months ago patient had a syncopal episode and received TPA and had some hematuria, but eventual workup showed no evidence of stroke. 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 high grade stenosis. Also noted high grade stenosis vs. occlusion of the proximal left vertebral artery. Patient transferred to [facility] for NeuroIR intervention and taken for mechanical thrombectomy on 8/21. TICI 2B.    MRI brain revealed acute bilateral cerebellar and pontine infarcts. Etiology at this time with competing mechanisms. Appears large artery embolism, however patient with hx of atrial fibrillation and not on anticoagulation which could be contributory as well.     Patient extubated on 8/30/24, now w/ acute hypoxic respiratory failure secondary to neurologic injury, currently tachypneic on humidified supplemental O2.   Palliative consulted given poor prognosis. Pt now DNR/DNI, family has expressed they do not want escalation of care but still deciding on comfort measures.   Pending further GOC conversations with patient's family, as patient has failed speech & swallow eval and cannot take PO, and wishes regarding enteral access/nutritional support have not yet been determined.     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 CT Head 8/27 stable. ***Consider timeline regarding initiation of AC if in line with family wishes/GOC.  -STATIN THERAPY: Pending, , initiate if/when enteral access obtained, titrate statin to LDL goal less than 70   -Dysphagia screen: FAIL  -Physical therapy/OT/Speech eval/treatment  -TTE as noted with EF of 37%         -DVT ppx: Heparin s.c [x]     -Palliative care following to help facilitate GOC discussion   -Stroke education    OTHER: Condition and plan of care d/w patient, questions and concerns addressed.     DISPOSITION: Per primary team    CORE MEASURES:        Admission NIHSS: 31     Tenecteplase : [] YES [x] NO      LDL/A1C: 109/5.4     Depression Screen- if depression hx and/or present      Statin Therapy: as noted above     Dysphagia Screen: [] PASS [x] FAIL      Smoking [] YES [x] NO      Afib [x] YES [] NO     Stroke Education [x] YES 8/21/24 [] NO   INCOMPLETE/PENDING  ASSESSMENT: 89 Yo M with PMH of HTN, colon cancer, afib(diagnosed in 2022 and refused anticoagulation), and enlarged prostate was transferred from [facility] to [facility] on 8/21. Per chart patient initially complained of nausea,  vomiting and dizziness s/p dinner on 8/20. Was brought to [place] on 8/20 and was found to be unresponsive, apneic breathing, and decorticate posturing in [place] ED. Patient intubated in ED. LKW was 20:00 on 8/20. As per chart review son stated that about 4 months ago patient had a syncopal episode and received TPA and had some hematuria, but eventual workup showed no evidence of stroke. 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 high grade stenosis. Also noted high grade stenosis vs. occlusion of the proximal left vertebral artery. Patient transferred to [facility] for NeuroIR intervention and taken for mechanical thrombectomy on 8/21. TICI 2B.    MRI brain revealed acute bilateral cerebellar and pontine infarcts. Etiology at this time with competing mechanisms. Appears large artery embolism, however patient with hx of atrial fibrillation and not on anticoagulation which could be contributory as well.     Patient extubated on 8/30/24, now w/ acute hypoxic respiratory failure secondary to neurologic injury, currently tachypneic on humidified supplemental O2.   Palliative consulted given poor prognosis. Pt now DNR/DNI, family has expressed they do not want escalation of care but still deciding on comfort measures.   Pending further GOC conversations with patient's family, as patient has failed speech & swallow eval and cannot take PO, and wishes regarding enteral access/nutritional support have not yet been determined.     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 CT Head 8/27 stable. Consider initiation of AC if in line with family wishes/GOC.   -STATIN THERAPY: Pending, , initiate if/when enteral access obtained, titrate statin to LDL goal less than 70   -Dysphagia screen: FAIL  -Physical therapy/OT/Speech eval/treatment  -TTE as noted with EF of 37%         -DVT ppx: Heparin s.c [x]     -Palliative care following to help facilitate GOC discussion   -Stroke education    OTHER: Condition and plan of care d/w patient, questions and concerns addressed.     DISPOSITION: Per primary team    CORE MEASURES:        Admission NIHSS: 31     Tenecteplase : [] YES [x] NO      LDL/A1C: 109/5.4     Depression Screen- if depression hx and/or present      Statin Therapy: as noted above     Dysphagia Screen: [] PASS [x] FAIL      Smoking [] YES [x] NO      Afib [x] YES [] NO     Stroke Education [x] YES 8/21/24 [] NO   ASSESSMENT: 89 Yo M with PMH of HTN, colon cancer, afib(diagnosed in 2022 and refused anticoagulation), and enlarged prostate was transferred from [facility] to [facility] on 8/21. Per chart patient initially complained of nausea,  vomiting and dizziness s/p dinner on 8/20. Was brought to [place] on 8/20 and was found to be unresponsive, apneic breathing, and decorticate posturing in [place] ED. Patient intubated in ED. LKW was 20:00 on 8/20. As per chart review son stated that about 4 months ago patient had a syncopal episode and received TPA and had some hematuria, but eventual workup showed no evidence of stroke. 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 high grade stenosis. Also noted high grade stenosis vs. occlusion of the proximal left vertebral artery. Patient transferred to [facility] for NeuroIR intervention and taken for mechanical thrombectomy on 8/21. TICI 2B.    MRI brain revealed acute bilateral cerebellar and pontine infarcts. Etiology at this time with competing mechanisms. Appears large artery embolism, however patient with hx of atrial fibrillation and not on anticoagulation which could be contributory as well.     Patient extubated on 8/30/24, now w/ acute hypoxic respiratory failure secondary to neurologic injury, currently tachypneic on humidified supplemental O2.   Palliative consulted given poor prognosis. Pt now DNR/DNI, family has expressed they do not want escalation of care but still deciding on comfort measures.   Pending further GOC conversations with patient's family, as patient has failed speech & swallow eval and cannot take PO, and wishes regarding enteral access/nutritional support have not yet been determined.     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 CT Head 8/27 stable. Consider initiation of AC if in line with family wishes/GOC.   -STATIN THERAPY: Pending, , initiate if/when enteral access obtained, titrate statin to LDL goal less than 70   -Dysphagia screen: FAIL  -Physical therapy/OT/Speech eval/treatment  -TTE as noted with EF of 37%         -DVT ppx: Heparin s.c [x]     -Palliative care following to help facilitate GOC discussion   -Stroke education    OTHER: Condition and plan of care d/w patient, questions and concerns addressed.     DISPOSITION: Per primary team    CORE MEASURES:        Admission NIHSS: 31     Tenecteplase : [] YES [x] NO      LDL/A1C: 109/5.4     Depression Screen- if depression hx and/or present      Statin Therapy: as noted above     Dysphagia Screen: [] PASS [x] FAIL      Smoking [] YES [x] NO      Afib [x] YES [] NO     Stroke Education [x] YES 8/21/24 [] NO

## 2024-09-03 NOTE — PROGRESS NOTE ADULT - ASSESSMENT
89yr man  from long term  hx HTN, HLD admitted with basilar occlusion, s/p revascularization complicated by respiratory failure needing vent support    CVA/ Basilar Occlusion  Care per NSICU  neuro checks    Acute Respiratory Failure  PNA  extubated on HF  s/p IV abx    Dysphagia  Aspiration precautions  SLP eval    HARINDER on CKD  avoid nephrotoxic agents    Debility  Assist with care  Turn/reposition  Safety precautions    Encounter for Palliative Care  Palliative Care consulted to assist with GOC  Kyree Elena is the reported HCP. HCP form in chart    Patient NPO. Though doubtful patient will be able to take PO will get SLP for further eval

## 2024-09-03 NOTE — SWALLOW BEDSIDE ASSESSMENT ADULT - SWALLOW EVAL: DIAGNOSIS
Severe oral dysphagia impacted upon by lethargy, marked by absent orientation to utensil w/no attempt for bolus acceptance or PO manipulation despite max cues. No response to scant amount of puree placed to anterior labial surface. DONTRELL pharyngeal phase of swallow as no swallow trigger elicited, due to severity of oral deficits. PO is contraindicated at this time.

## 2024-09-03 NOTE — PROGRESS NOTE ADULT - SUBJECTIVE AND OBJECTIVE BOX
Chief complaint:   Patient is a 89y old  Male who presents with a chief complaint of Stroke (02 Sep 2024 09:07)    HPI:  89 Yo M with PMH of HTN, colon cancer, enlarged prostate transfer from [facility] from [facility] for altered mental status. LKW 7PM, Per chart, Patient initially complained of  Nausea,  vomiting and dizziness s/p dinner, later  found to be unresponsive and decorticate posturing in [place] ED. 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 Patient   transferred to [facility] for NeuroIR intervention.     24hr EVENTS:      ROS: [ ]  Unable to assess due to mental status   All other systems negative    -----------------------------------------------------------------------------------------------------------------------------------------------------------------------------------  ICU Vital Signs Last 24 Hrs  T(C): 36.5 (03 Sep 2024 08:00), Max: 37.2 (02 Sep 2024 13:00)  T(F): 97.7 (03 Sep 2024 08:00), Max: 99 (02 Sep 2024 13:00)  HR: 83 (03 Sep 2024 08:00) (62 - 89)  BP: 141/59 (03 Sep 2024 08:00) (127/65 - 151/61)  BP(mean): 84 (03 Sep 2024 08:00) (72 - 97)  ABP: --  ABP(mean): --  RR: 13 (03 Sep 2024 08:00) (10 - 23)  SpO2: 97% (03 Sep 2024 08:00) (92% - 100%)    O2 Parameters below as of 03 Sep 2024 08:00  Patient On (Oxygen Delivery Method): nasal cannula  O2 Flow (L/min): 3          I&O's Summary    02 Sep 2024 07:01  -  03 Sep 2024 07:00  --------------------------------------------------------  IN: 1150 mL / OUT: 1345 mL / NET: -195 mL        MEDICATIONS  (STANDING):  albuterol/ipratropium for Nebulization 3 milliLiter(s) Nebulizer every 6 hours  aspirin Suppository 300 milliGRAM(s) Rectal daily  chlorhexidine 2% Cloths 1 Application(s) Topical daily  heparin   Injectable 5000 Unit(s) SubCutaneous every 8 hours  nystatin Powder 1 Application(s) Topical two times a day  pantoprazole  Injectable 40 milliGRAM(s) IV Push every 12 hours  sodium chloride 0.9%. 1000 milliLiter(s) (50 mL/Hr) IV Continuous <Continuous>      RESPIRATORY:        IMAGING:   Recent imaging studies were reviewed.    LAB RESULTS:                          8.7    10.67 )-----------( 196      ( 03 Sep 2024 04:52 )             27.4           09-03    142  |  106  |  34.5<H>  ----------------------------<  87  4.5   |  22.0  |  1.32<H>    Ca    8.4      03 Sep 2024 04:52  Phos  3.9     09-03  Mg     2.1     09-03                  -----------------------------------------------------------------------------------------------------------------------------------------------------------------------------------    PHYSICAL EXAM:  General: Calm  HEENT: MMM  Neuro:  -Mental status- No acute distress, EO to stim, FC on L  -CN- PERRL 3mm, EOMI, R facial droop  R WD  LUE AG shows 2 fingers  LLE AG    CV: RRR  Pulm: clear to auscultation  Abd: Soft, nontender, nondistended  Ext: no noted edema in lower ext  Skin: warm, dry       Chief complaint:   Patient is a 89y old  Male who presents with a chief complaint of Stroke (02 Sep 2024 09:07)    HPI:  89 Yo M with PMH of HTN, colon cancer, enlarged prostate transfer from [facility] from [facility] for altered mental status. LKW 7PM, Per chart, Patient initially complained of  Nausea,  vomiting and dizziness s/p dinner, later  found to be unresponsive and decorticate posturing in [place] ED. 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 Patient   transferred to [facility] for NeuroIR intervention.     24hr EVENTS:  weaned off HFNC    ROS: [x ]  Unable to assess due to mental status   All other systems negative    -----------------------------------------------------------------------------------------------------------------------------------------------------------------------------------  ICU Vital Signs Last 24 Hrs  T(C): 36.5 (03 Sep 2024 08:00), Max: 37.2 (02 Sep 2024 13:00)  T(F): 97.7 (03 Sep 2024 08:00), Max: 99 (02 Sep 2024 13:00)  HR: 83 (03 Sep 2024 08:00) (62 - 89)  BP: 141/59 (03 Sep 2024 08:00) (127/65 - 151/61)  BP(mean): 84 (03 Sep 2024 08:00) (72 - 97)  ABP: --  ABP(mean): --  RR: 13 (03 Sep 2024 08:00) (10 - 23)  SpO2: 97% (03 Sep 2024 08:00) (92% - 100%)    O2 Parameters below as of 03 Sep 2024 08:00  Patient On (Oxygen Delivery Method): nasal cannula  O2 Flow (L/min): 3          I&O's Summary    02 Sep 2024 07:01  -  03 Sep 2024 07:00  --------------------------------------------------------  IN: 1150 mL / OUT: 1345 mL / NET: -195 mL        MEDICATIONS  (STANDING):  albuterol/ipratropium for Nebulization 3 milliLiter(s) Nebulizer every 6 hours  aspirin Suppository 300 milliGRAM(s) Rectal daily  chlorhexidine 2% Cloths 1 Application(s) Topical daily  heparin   Injectable 5000 Unit(s) SubCutaneous every 8 hours  nystatin Powder 1 Application(s) Topical two times a day  pantoprazole  Injectable 40 milliGRAM(s) IV Push every 12 hours  sodium chloride 0.9%. 1000 milliLiter(s) (50 mL/Hr) IV Continuous <Continuous>      IMAGING:   Recent imaging studies were reviewed.    LAB RESULTS:                          8.7    10.67 )-----------( 196      ( 03 Sep 2024 04:52 )             27.4     09-03    142  |  106  |  34.5<H>  ----------------------------<  87  4.5   |  22.0  |  1.32<H>    Ca    8.4      03 Sep 2024 04:52  Phos  3.9     09-03  Mg     2.1     09-03      -----------------------------------------------------------------------------------------------------------------------------------------------------------------------------------    PHYSICAL EXAM:  General: Calm  HEENT: MMM  Neuro:  -Mental status- No acute distress, EO to stim, FC on L  -CN- PERRL 3mm, EOMI, R facial droop  R WD  LUE AG shows 2 fingers  LLE AG    CV: RRR  Pulm: clear to auscultation  Abd: Soft, nontender, nondistended  Ext: no noted edema in lower ext  Skin: warm, dry       Chief complaint:   Patient is a 89y old  Male who presents with a chief complaint of Stroke (02 Sep 2024 09:07)    HPI:  89 Yo M with PMH of HTN, colon cancer, enlarged prostate transfer from [facility] from [facility] for altered mental status. LKW 7PM, Per chart, Patient initially complained of  Nausea,  vomiting and dizziness s/p dinner, later  found to be unresponsive and decorticate posturing in [place] ED. 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 Patient   transferred to [facility] for NeuroIR intervention.     24hr EVENTS:  weaned off HFNC    ROS: [x ]  Unable to assess due to mental status   All other systems negative    -----------------------------------------------------------------------------------------------------------------------------------------------------------------------------------  ICU Vital Signs Last 24 Hrs  T(C): 36.5 (03 Sep 2024 08:00), Max: 37.2 (02 Sep 2024 13:00)  T(F): 97.7 (03 Sep 2024 08:00), Max: 99 (02 Sep 2024 13:00)  HR: 83 (03 Sep 2024 08:00) (62 - 89)  BP: 141/59 (03 Sep 2024 08:00) (127/65 - 151/61)  BP(mean): 84 (03 Sep 2024 08:00) (72 - 97)  ABP: --  ABP(mean): --  RR: 13 (03 Sep 2024 08:00) (10 - 23)  SpO2: 97% (03 Sep 2024 08:00) (92% - 100%)    O2 Parameters below as of 03 Sep 2024 08:00  Patient On (Oxygen Delivery Method): nasal cannula  O2 Flow (L/min): 3        I&O's Summary    02 Sep 2024 07:01  -  03 Sep 2024 07:00  --------------------------------------------------------  IN: 1150 mL / OUT: 1345 mL / NET: -195 mL        MEDICATIONS  (STANDING):  albuterol/ipratropium for Nebulization 3 milliLiter(s) Nebulizer every 6 hours  aspirin Suppository 300 milliGRAM(s) Rectal daily  chlorhexidine 2% Cloths 1 Application(s) Topical daily  heparin   Injectable 5000 Unit(s) SubCutaneous every 8 hours  nystatin Powder 1 Application(s) Topical two times a day  pantoprazole  Injectable 40 milliGRAM(s) IV Push every 12 hours  sodium chloride 0.9%. 1000 milliLiter(s) (50 mL/Hr) IV Continuous <Continuous>      IMAGING:   Recent imaging studies were reviewed.    LAB RESULTS:                          8.7    10.67 )-----------( 196      ( 03 Sep 2024 04:52 )             27.4     09-03    142  |  106  |  34.5<H>  ----------------------------<  87  4.5   |  22.0  |  1.32<H>    Ca    8.4      03 Sep 2024 04:52  Phos  3.9     09-03  Mg     2.1     09-03      -----------------------------------------------------------------------------------------------------------------------------------------------------------------------------------    PHYSICAL EXAM:  General: Calm  HEENT: MMM  Neuro:  -Mental status- No acute distress, EO to stim, FC on L  -CN- PERRL 2mm, EOMI, R facial droop  R WD  LUE AG shows 2 fingers  LLE     CV: RRR  Pulm: clear to auscultation  Abd: Soft, nontender, nondistended  Ext: no noted edema in lower ext  Skin: warm, dry

## 2024-09-03 NOTE — SWALLOW BEDSIDE ASSESSMENT ADULT - SLP GENERAL OBSERVATIONS
Recd lethargic in bed, A&A Ox0, nonverbal, no command following, sternal rub & pinch with minimal responsiveness, 0/10 nonverbal pain pre/post, tolerating O2 via NC NAD SPO2 100% throughout

## 2024-09-04 LAB
ANION GAP SERPL CALC-SCNC: 16 MMOL/L — SIGNIFICANT CHANGE UP (ref 5–17)
BUN SERPL-MCNC: 30.1 MG/DL — HIGH (ref 8–20)
CALCIUM SERPL-MCNC: 8.8 MG/DL — SIGNIFICANT CHANGE UP (ref 8.4–10.5)
CHLORIDE SERPL-SCNC: 105 MMOL/L — SIGNIFICANT CHANGE UP (ref 96–108)
CO2 SERPL-SCNC: 21 MMOL/L — LOW (ref 22–29)
CREAT SERPL-MCNC: 1.04 MG/DL — SIGNIFICANT CHANGE UP (ref 0.5–1.3)
EGFR: 67 ML/MIN/1.73M2 — SIGNIFICANT CHANGE UP
GLUCOSE BLDC GLUCOMTR-MCNC: 102 MG/DL — HIGH (ref 70–99)
GLUCOSE BLDC GLUCOMTR-MCNC: 107 MG/DL — HIGH (ref 70–99)
GLUCOSE BLDC GLUCOMTR-MCNC: 95 MG/DL — SIGNIFICANT CHANGE UP (ref 70–99)
GLUCOSE SERPL-MCNC: 103 MG/DL — HIGH (ref 70–99)
HCT VFR BLD CALC: 30.6 % — LOW (ref 39–50)
HGB BLD-MCNC: 9.6 G/DL — LOW (ref 13–17)
MAGNESIUM SERPL-MCNC: 2 MG/DL — SIGNIFICANT CHANGE UP (ref 1.6–2.6)
MCHC RBC-ENTMCNC: 27.3 PG — SIGNIFICANT CHANGE UP (ref 27–34)
MCHC RBC-ENTMCNC: 31.4 GM/DL — LOW (ref 32–36)
MCV RBC AUTO: 86.9 FL — SIGNIFICANT CHANGE UP (ref 80–100)
PHOSPHATE SERPL-MCNC: 2.7 MG/DL — SIGNIFICANT CHANGE UP (ref 2.4–4.7)
PLATELET # BLD AUTO: 231 K/UL — SIGNIFICANT CHANGE UP (ref 150–400)
POTASSIUM SERPL-MCNC: 4.1 MMOL/L — SIGNIFICANT CHANGE UP (ref 3.5–5.3)
POTASSIUM SERPL-SCNC: 4.1 MMOL/L — SIGNIFICANT CHANGE UP (ref 3.5–5.3)
RBC # BLD: 3.52 M/UL — LOW (ref 4.2–5.8)
RBC # FLD: 14.7 % — HIGH (ref 10.3–14.5)
SODIUM SERPL-SCNC: 141 MMOL/L — SIGNIFICANT CHANGE UP (ref 135–145)
WBC # BLD: 12.48 K/UL — HIGH (ref 3.8–10.5)
WBC # FLD AUTO: 12.48 K/UL — HIGH (ref 3.8–10.5)

## 2024-09-04 PROCEDURE — 99233 SBSQ HOSP IP/OBS HIGH 50: CPT

## 2024-09-04 PROCEDURE — 99497 ADVNCD CARE PLAN 30 MIN: CPT | Mod: 25

## 2024-09-04 RX ADMIN — Medication 5000 UNIT(S): at 13:56

## 2024-09-04 RX ADMIN — IPRATROPIUM BROMIDE AND ALBUTEROL SULFATE 3 MILLILITER(S): .5; 3 SOLUTION RESPIRATORY (INHALATION) at 21:11

## 2024-09-04 RX ADMIN — Medication 1 APPLICATION(S): at 18:00

## 2024-09-04 RX ADMIN — SODIUM CHLORIDE 50 MILLILITER(S): 9 INJECTION INTRAMUSCULAR; INTRAVENOUS; SUBCUTANEOUS at 01:28

## 2024-09-04 RX ADMIN — Medication 40 MILLIGRAM(S): at 05:36

## 2024-09-04 RX ADMIN — POVIDONE, PROPYLENE GLYCOL 1 DROP(S): 6.8; 3 LIQUID OPHTHALMIC at 13:58

## 2024-09-04 RX ADMIN — Medication 1 APPLICATION(S): at 05:38

## 2024-09-04 RX ADMIN — SODIUM CHLORIDE 50 MILLILITER(S): 9 INJECTION INTRAMUSCULAR; INTRAVENOUS; SUBCUTANEOUS at 21:37

## 2024-09-04 RX ADMIN — Medication 10 MILLIGRAM(S): at 04:09

## 2024-09-04 RX ADMIN — Medication 5000 UNIT(S): at 05:36

## 2024-09-04 RX ADMIN — Medication 40 MILLIGRAM(S): at 17:59

## 2024-09-04 RX ADMIN — Medication 5000 UNIT(S): at 21:37

## 2024-09-04 RX ADMIN — IPRATROPIUM BROMIDE AND ALBUTEROL SULFATE 3 MILLILITER(S): .5; 3 SOLUTION RESPIRATORY (INHALATION) at 08:38

## 2024-09-04 RX ADMIN — IPRATROPIUM BROMIDE AND ALBUTEROL SULFATE 3 MILLILITER(S): .5; 3 SOLUTION RESPIRATORY (INHALATION) at 14:41

## 2024-09-04 RX ADMIN — Medication 300 MILLIGRAM(S): at 13:58

## 2024-09-04 RX ADMIN — CHLORHEXIDINE GLUCONATE 1 APPLICATION(S): 40 SOLUTION TOPICAL at 13:58

## 2024-09-04 NOTE — PROGRESS NOTE ADULT - SUBJECTIVE AND OBJECTIVE BOX
CC:  Follow up GOC , Symptoms    OVERNIGHT EVENTS:  no change    Present Symptoms:   Dyspnea:  No    Nausea/Vomiting:  No    Anxiety/ Agitation No     Depression:  Unable to assess  Fatigue: yes  Loss of appetite:   NA   Constipation: Not Reported      Pain: No               Location            Duration            Character            Severity            Factors            Effect    Pain AD Score:  [email or web address]/painad.pdf (press ctrl + left click to view)    Review of Systems: Reviewed as above  Further ROS unable limited to  obtain due to poor mentation      MEDICATIONS  (STANDING):  albuterol/ipratropium for Nebulization 3 milliLiter(s) Nebulizer every 6 hours  aspirin Suppository 300 milliGRAM(s) Rectal daily  chlorhexidine 2% Cloths 1 Application(s) Topical daily  heparin   Injectable 5000 Unit(s) SubCutaneous every 8 hours  nystatin Powder 1 Application(s) Topical two times a day  pantoprazole  Injectable 40 milliGRAM(s) IV Push every 12 hours  sodium chloride 0.9%. 1000 milliLiter(s) (50 mL/Hr) IV Continuous <Continuous>    MEDICATIONS  (PRN):  acetylcysteine 20%  Inhalation 4 milliLiter(s) Inhalation every 6 hours PRN Thick secretions  artificial  tears Solution 1 Drop(s) Both EYES every 6 hours PRN Dry Eyes  bisacodyl Suppository 10 milliGRAM(s) Rectal daily PRN Constipation  hydrALAZINE Injectable 10 milliGRAM(s) IV Push every 2 hours PRN SBP>160      PHYSICAL EXAM:    Vital Signs Last 24 Hrs  T(C): 37.2 (04 Sep 2024 09:15), Max: 37.9 (04 Sep 2024 00:20)  T(F): 98.9 (04 Sep 2024 09:15), Max: 100.2 (04 Sep 2024 00:20)  HR: 78 (04 Sep 2024 09:03) (76 - 94)  BP: 153/71 (04 Sep 2024 09:15) (146/68 - 172/73)  BP(mean): 106 (04 Sep 2024 04:00) (89 - 108)  RR: 18 (04 Sep 2024 09:15) (16 - 18)  SpO2: 92% (04 Sep 2024 09:15) (92% - 99%)    Parameters below as of 04 Sep 2024 09:15  Patient On (Oxygen Delivery Method): nasal cannula  O2 Flow (L/min): 3      Karnofsky: 20%  General:  Elderly man NAD     HEENT:  NCAT     eyes open  Lungs: comfortable  non labored  CV:  Rr  GI:  soft NTND  MSK: weak BB  Skin:  warm/dry  Neuro R plegia,   Psych calm     LABS:                          9.6    12.48 )-----------( 231      ( 04 Sep 2024 05:52 )             30.6     09-04    141  |  105  |  30.1<H>  ----------------------------<  103<H>  4.1   |  21.0<L>  |  1.04    Ca    8.8      04 Sep 2024 05:52  Phos  2.7     09-04  Mg     2.0     09-04        Urinalysis Basic - ( 04 Sep 2024 05:52 )    Color: x / Appearance: x / SG: x / pH: x  Gluc: 103 mg/dL / Ketone: x  / Bili: x / Urobili: x   Blood: x / Protein: x / Nitrite: x   Leuk Esterase: x / RBC: x / WBC x   Sq Epi: x / Non Sq Epi: x / Bacteria: x      I&O's Summary    03 Sep 2024 07:01  -  04 Sep 2024 07:00  --------------------------------------------------------  IN: 900 mL / OUT: 1410 mL / NET: -510 mL    04 Sep 2024 07:01  -  04 Sep 2024 14:18  --------------------------------------------------------  IN: 0 mL / OUT: 250 mL / NET: -250 mL        RADIOLOGY & ADDITIONAL STUDIES:  Imaging Reviewed  (  x )   < from: Xray Chest 1 View- PORTABLE-Urgent (Xray Chest 1 View- PORTABLE-Urgent .) (09.01.24 @ 05:39) >  CC: [id number] EXAM:  XR CHEST PORTABLE URGENT 1V   ORDERED BY: VANDANA GARCIA     PROCEDURE DATE:  09/01/2024          INTERPRETATION:  Follow-up.    AP chest. Prior 8/29/2024.    IMPRESSION:  Low lung volumes. Endotracheal tube nasogastric tubehave been removed.   No change heart mediastinum. Right basilar atelectasis similar to prior.   Slight increase in the opacity at the left base may reflect increase in   small left pleural effusion and/or atelectasis. Correlate clinically for   superimposed infection. No pneumothorax or other interval change    --- End of Report ---          < end of copied text >

## 2024-09-04 NOTE — CHART NOTE - NSCHARTNOTEFT_GEN_A_CORE
Source: Patient [ ]  Family [ ]   other [ x]    Current Diet:   Diet, NPO (09-02-24 @ 16:18)    Patient reports [ ] nausea  [ ] vomiting [ ] diarrhea [ ] constipation  [ x]chewing problems [x ] swallowing issues  [ ] other:     Current Weight:   (9/4)  163.4 lbs  (9/3)  164.9 lbs    % Weight Change: Weight loss noted    Pertinent Medications: MEDICATIONS  (STANDING):  pantoprazole  Injectable 40 milliGRAM(s) IV Push every 12 hours    Pertinent Labs: CBC Full  -  ( 04 Sep 2024 05:52 )  WBC Count : 12.48 K/uL  RBC Count : 3.52 M/uL  Hemoglobin : 9.6 g/dL  Hematocrit : 30.6 %  Platelet Count - Automated : 231 K/uL  Mean Cell Volume : 86.9 fl  Mean Cell Hemoglobin : 27.3 pg  Mean Cell Hemoglobin Concentration : 31.4 gm/dL  09-04 Na141 mmol/L Glu 103 mg/dL<H> K+ 4.1 mmol/L Cr  1.04 mg/dL BUN 30.1 mg/dL<H> Phos 2.7 mg/dL Alb n/a   PAB n/a       Skin: Stage 2 R ankle, Moisture Associated Dermatitis  Edema: 3+ R/L UE    Nutrition focused physical exam conducted - found signs of malnutrition [ ]absent [ x]present    Subcutaneous fat loss: [ x] Orbital fat pads region, [x ]Buccal fat region, [ ]Triceps region,  [ ]Ribs region    Muscle wasting: [x ]Temples region, [ x]Clavicle region, [ ]Shoulder region, [ ]Scapula region, [ ]Interosseous region,  [ ]thigh region, [ ]Calf region    Estimated Needs:   [x ] no change since previous assessment  [ ] recalculated:     Current Nutrition Diagnosis: Pt presents at high nutrition risk secondary to malnutrition (severe acute) related to inability to meet sufficient protein-energy needs in setting of dysphagia 2/2 acute CVA s/p TICI 2B revascularization and L-vertebral artery angioplasty, s/p ESBL Proteus PNA, new HFrEF, renal insufficiency as evidenced by meeting <50% EER x5 days, mod/sev muscle/fat loss, likely underlying weight loss masked by 3+ B/L UE edema.   Pt s/p several SLP evals in house, most recent competed today with continued recommendations to remain NPO with alternate means of nutrition/hydration 2/2 poor mentation, A&O x0. Palliative following, family declines NGT. No comfort measures at this time however, Pt/family wishes for no escalation of care.     Recommendations:   RD to provide recommendations based on Pt/family wishes   Continue SLP intervention as feasible    Monitoring and Evaluation:   [ ] PO intake [ ] Tolerance to diet prescription [X] Weights  [X] Follow up per protocol [X] Labs:

## 2024-09-04 NOTE — PROGRESS NOTE ADULT - ASSESSMENT
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 Transferred to [facility] NICU and underwent TICI 2b thrombectomy and L vert angioplasty 08/21/24. Post CT concerning for possible hemorrhage, subsequently neg (likely artifact vs contrast). Extubated to HFNC > NC. DNR/DNI but not yet comfort. Palliative on board. Transferred to Medicine     Acute CVA   2/2 basilar occlusion s/p TICI 2B revascularization and L-vertebral artery angioplasty on 8/21  Started on  mg GA   Repeat CTH 08/27 showed stable pontine infarct. Stable bilateral cerebellar hemisphere infarctions   A1c 5.4 and 109  LDL goal <70 but statin held as pt is NPO   TTE on 08/21/24 showed LVEF 37%, multiple WMAs, mild TR, mod MR   Aggressive chest PT and prn Mucomyst/alb nebs  Aspiration precautions including HOB elevation   NPO due to concern for dysphagia/aspiration  SLP tracey noted, appreciate recs  Goal -160 as per neuro ICU recs  c/w VS and neuro checks q4h    Dysphagia due to recent CVA  Remains NPO given concern for ongoing aspiration   Family does not want NGT as per discussion w/ ICU   Pt in my medical opinion is a poor candidate for PEG and peg will not lower risk of aspiration   Leukocytosis today likely from ongoing aspiration pneumonitis  SLP consulted to evaluate   Ongoing GOC discussions to be had w/ family   Palliative following     Leukocytosis suspect due to ongoing aspiration   s/p course of ertepenem for ESBL proteus PNA  Stable VS and remains Afebrile   Keep NPO and c/w aspiration precautions   Monitor CBC and temperature      Normocytic anemia   Likely of chronic dx   Hemodynamically stable and no active bleeding reported   Baseline Hb unknown but has ranged from 7.6-9.5 on this admission   Currently H/H remains stable   Monitor CBC and transfuse for Hb<8 if famly agreeable     HARINDER vs CKD  Resolved  Continue Thomas for now  Monitor I/O's, renal fxn and lytes  Avoid nephrotoxic meds or renally dose if needed    AF   Currently rate controlled   AC on hold pending further GOC discussions   Monitor on telemetry      s/p ESBL Proteus PNA  Sputum cultures grew ESBL proteus in ETT cultures on 08/25  Completed full course of ertapenem   Per ID pt is probably a colonizer   Bcxs NGTD     New HFrEF   Clinically euvolemic at this time   Previous EF in 2022 was 50%   TTE on 08/21/24 showed LVEF 37%, multiple WMAs, mild TR, mod MR   TSH normal   GDMT differed by cardio due to pt being NPO and HARINDER and BB differed due to pt having periods of bradycardia   Ischemic workup deferred by cardiolgy due to current medical condition   SBP goal per neuro icu recs is 100-160   Monitor daily weight and I/O's  Monitor on telemetry     HTN  Avoid CCBs in setting of rEF  IV prn antihypertensives ordered as pt is NPO due to concern for dysphagia/aspiration        VTE ppx: SCDs and heparin sq     Dispo:  Ongoing GOC discussions w/ family to determine course of management.   Prognosis poor .

## 2024-09-04 NOTE — PROGRESS NOTE ADULT - ASSESSMENT
89yr man  from [facility]  hx HTN, HLD admitted with basilar occlusion, s/p revascularization complicated by respiratory failure needing vent support    CVA/ Basilar Occlusion  monitor neuro status    Acute Respiratory Failure  PNA  extubated on nc  s/p IV abx    Dysphagia  Aspiration precautions  SLP eval - NPO    HARINDER on CKD  avoid nephrotoxic agents    Debility  Assist with care  Turn/reposition  Safety precautions    Encounter for Palliative Care  Palliative Care consulted to assist with GOC  Son Ulices is the reported HCP. form in chart    See GOC note above for details.  In summary. Ulices leaning towards NO PEG,  wants to solidify answer with rest of family. Agreeable for me to follow up tomorrow morning for confirmation.   Psychosocial support

## 2024-09-04 NOTE — PROGRESS NOTE ADULT - SUBJECTIVE AND OBJECTIVE BOX
[facility] Division of Hospital Medicine    Chief Complaint:  CVA    SUBJECTIVE / OVERNIGHT EVENTS:  Pt examined laying in bed  Arousable and able to make eye contact  Limited ability to life LUE  ROS not obtained 2/2 mental status     MEDICATIONS  (STANDING):  albuterol/ipratropium for Nebulization 3 milliLiter(s) Nebulizer every 6 hours  aspirin Suppository 300 milliGRAM(s) Rectal daily  chlorhexidine 2% Cloths 1 Application(s) Topical daily  heparin   Injectable 5000 Unit(s) SubCutaneous every 8 hours  nystatin Powder 1 Application(s) Topical two times a day  pantoprazole  Injectable 40 milliGRAM(s) IV Push every 12 hours  sodium chloride 0.9%. 1000 milliLiter(s) (50 mL/Hr) IV Continuous <Continuous>    MEDICATIONS  (PRN):  acetylcysteine 20%  Inhalation 4 milliLiter(s) Inhalation every 6 hours PRN Thick secretions  artificial  tears Solution 1 Drop(s) Both EYES every 6 hours PRN Dry Eyes  bisacodyl Suppository 10 milliGRAM(s) Rectal daily PRN Constipation  hydrALAZINE Injectable 10 milliGRAM(s) IV Push every 2 hours PRN SBP>160        I&O's Summary    03 Sep 2024 07:01  -  04 Sep 2024 07:00  --------------------------------------------------------  IN: 900 mL / OUT: 1410 mL / NET: -510 mL    04 Sep 2024 07:01  -  04 Sep 2024 13:29  --------------------------------------------------------  IN: 0 mL / OUT: 250 mL / NET: -250 mL        PHYSICAL EXAM:  Vital Signs Last 24 Hrs  T(C): 37.2 (04 Sep 2024 09:15), Max: 37.9 (04 Sep 2024 00:20)  T(F): 98.9 (04 Sep 2024 09:15), Max: 100.2 (04 Sep 2024 00:20)  HR: 78 (04 Sep 2024 09:03) (76 - 94)  BP: 153/71 (04 Sep 2024 09:15) (146/68 - 172/73)  BP(mean): 106 (04 Sep 2024 04:00) (89 - 108)  RR: 18 (04 Sep 2024 09:15) (15 - 18)  SpO2: 92% (04 Sep 2024 09:15) (92% - 100%)    Parameters below as of 04 Sep 2024 09:15  Patient On (Oxygen Delivery Method): nasal cannula  O2 Flow (L/min): 3          CONSTITUTIONAL: elderly male laying in bed  ENMT: Dry oral mucosa, transmitted pharyngeal sounds   RESPIRATORY: Shallow BS b/l , b/l rhonchi appreciated  CARDIOVASCULAR: Regular rate and rhythm, normal S1 and S2, no murmur/rub/gallop; No lower extremity edema; Peripheral pulses are 2+ bilaterally  ABDOMEN: Nontender to palpation, normoactive bowel sounds, no rebound/guarding; No hepatosplenomegaly  MUSCLOSKELETAL:   no clubbing or cyanosis of digits; no joint swelling or tenderness to palpation  PSYCH: Awake   NEUROLOGY:  Facial droop RUE 0/5, LUE (prox) 3/5  SKIN: No rashes; no palpable lesions    LABS:                        9.6    12.48 )-----------( 231      ( 04 Sep 2024 05:52 )             30.6     09-04    141  |  105  |  30.1<H>  ----------------------------<  103<H>  4.1   |  21.0<L>  |  1.04    Ca    8.8      04 Sep 2024 05:52  Phos  2.7     09-04  Mg     2.0     09-04            Urinalysis Basic - ( 04 Sep 2024 05:52 )    Color: x / Appearance: x / SG: x / pH: x  Gluc: 103 mg/dL / Ketone: x  / Bili: x / Urobili: x   Blood: x / Protein: x / Nitrite: x   Leuk Esterase: x / RBC: x / WBC x   Sq Epi: x / Non Sq Epi: x / Bacteria: x        CAPILLARY BLOOD GLUCOSE      POCT Blood Glucose.: 102 mg/dL (04 Sep 2024 06:33)        MICRO:        RADIOLOGY & ADDITIONAL TESTS:  8/22/24 MRI bran  IMPRESSION:  Acute bilateral cerebellar and pontine infarcts as described above.   Susceptibility artifact in the left ventral mikaela suspicious for   hemorrhagic conversion for which a component of was seen on the recent   head CT. Additional more diffuse high signal in the mikaela more likely   contrast staining. Continual follow-up recommended.  No large hemorrhage, vasogenic edema or infarct in the supratentorial   brain parenchyma. Chronic lacunar infarcts in the bilateral corona   radiata and moderate severity chronic microvascular changes.      TTE    1. Left ventricular cavity is normal in size. Left ventricular systolic function is moderately decreased with an ejection fraction of 37 % by Kern's method of disks.   2. Multiple segmental abnormalities exist. See findings.   3. The left ventricular diastolic function is indeterminate.   4. Normal right ventricular cavity size and normal right ventricular systolic function.   5. Left atrium is mildly dilated.   6. The right atrium is normal in size.   7. Moderate mitral regurgitation.   8. Structurally normal mitral valve with normal leaflet excursion.   9. Mild tricuspid regurgitation.  10. Estimated pulmonary artery systolic pressure is 34 mmHg.  11. Lipomatous interatrial septal hypertrophy present.  12. Mild left ventricular hypertrophy.  13. There is mild calcification of the mitral valve annulus.  14. There is no evidence of a left ventricular thrombus.

## 2024-09-05 LAB
ANION GAP SERPL CALC-SCNC: 17 MMOL/L — SIGNIFICANT CHANGE UP (ref 5–17)
BUN SERPL-MCNC: 30.3 MG/DL — HIGH (ref 8–20)
CALCIUM SERPL-MCNC: 8.9 MG/DL — SIGNIFICANT CHANGE UP (ref 8.4–10.5)
CHLORIDE SERPL-SCNC: 107 MMOL/L — SIGNIFICANT CHANGE UP (ref 96–108)
CO2 SERPL-SCNC: 20 MMOL/L — LOW (ref 22–29)
CREAT SERPL-MCNC: 1.18 MG/DL — SIGNIFICANT CHANGE UP (ref 0.5–1.3)
EGFR: 58 ML/MIN/1.73M2 — LOW
GLUCOSE SERPL-MCNC: 103 MG/DL — HIGH (ref 70–99)
HCT VFR BLD CALC: 30.7 % — LOW (ref 39–50)
HGB BLD-MCNC: 9.3 G/DL — LOW (ref 13–17)
MCHC RBC-ENTMCNC: 26.8 PG — LOW (ref 27–34)
MCHC RBC-ENTMCNC: 30.3 GM/DL — LOW (ref 32–36)
MCV RBC AUTO: 88.5 FL — SIGNIFICANT CHANGE UP (ref 80–100)
PLATELET # BLD AUTO: 190 K/UL — SIGNIFICANT CHANGE UP (ref 150–400)
POTASSIUM SERPL-MCNC: 4 MMOL/L — SIGNIFICANT CHANGE UP (ref 3.5–5.3)
POTASSIUM SERPL-SCNC: 4 MMOL/L — SIGNIFICANT CHANGE UP (ref 3.5–5.3)
RBC # BLD: 3.47 M/UL — LOW (ref 4.2–5.8)
RBC # FLD: 15.2 % — HIGH (ref 10.3–14.5)
SODIUM SERPL-SCNC: 144 MMOL/L — SIGNIFICANT CHANGE UP (ref 135–145)
WBC # BLD: 13.85 K/UL — HIGH (ref 3.8–10.5)
WBC # FLD AUTO: 13.85 K/UL — HIGH (ref 3.8–10.5)

## 2024-09-05 PROCEDURE — 99233 SBSQ HOSP IP/OBS HIGH 50: CPT

## 2024-09-05 PROCEDURE — 99232 SBSQ HOSP IP/OBS MODERATE 35: CPT

## 2024-09-05 PROCEDURE — 99497 ADVNCD CARE PLAN 30 MIN: CPT | Mod: 25

## 2024-09-05 RX ORDER — SODIUM CHLORIDE 9 MG/ML
1000 INJECTION INTRAMUSCULAR; INTRAVENOUS; SUBCUTANEOUS
Refills: 0 | Status: DISCONTINUED | OUTPATIENT
Start: 2024-09-05 | End: 2024-09-06

## 2024-09-05 RX ORDER — LORAZEPAM 4 MG/ML
1 INJECTION INTRAMUSCULAR; INTRAVENOUS EVERY 4 HOURS
Refills: 0 | Status: DISCONTINUED | OUTPATIENT
Start: 2024-09-05 | End: 2024-09-06

## 2024-09-05 RX ORDER — GLYCOPYRROLATE 0.2 MG/ML
0.2 INJECTION INTRAMUSCULAR; INTRAVENOUS EVERY 6 HOURS
Refills: 0 | Status: DISCONTINUED | OUTPATIENT
Start: 2024-09-05 | End: 2024-09-06

## 2024-09-05 RX ADMIN — SODIUM CHLORIDE 30 MILLILITER(S): 9 INJECTION INTRAMUSCULAR; INTRAVENOUS; SUBCUTANEOUS at 12:11

## 2024-09-05 RX ADMIN — Medication 2 MILLIGRAM(S): at 12:44

## 2024-09-05 RX ADMIN — Medication 40 MILLIGRAM(S): at 17:01

## 2024-09-05 RX ADMIN — SODIUM CHLORIDE 30 MILLILITER(S): 9 INJECTION INTRAMUSCULAR; INTRAVENOUS; SUBCUTANEOUS at 22:25

## 2024-09-05 RX ADMIN — Medication 5000 UNIT(S): at 05:44

## 2024-09-05 RX ADMIN — Medication 1 APPLICATION(S): at 05:47

## 2024-09-05 RX ADMIN — Medication 40 MILLIGRAM(S): at 05:43

## 2024-09-05 RX ADMIN — GLYCOPYRROLATE 0.2 MILLIGRAM(S): 0.2 INJECTION INTRAMUSCULAR; INTRAVENOUS at 22:25

## 2024-09-05 RX ADMIN — Medication 2 MILLIGRAM(S): at 13:44

## 2024-09-05 RX ADMIN — IPRATROPIUM BROMIDE AND ALBUTEROL SULFATE 3 MILLILITER(S): .5; 3 SOLUTION RESPIRATORY (INHALATION) at 08:33

## 2024-09-05 RX ADMIN — Medication 1 APPLICATION(S): at 17:02

## 2024-09-05 RX ADMIN — IPRATROPIUM BROMIDE AND ALBUTEROL SULFATE 3 MILLILITER(S): .5; 3 SOLUTION RESPIRATORY (INHALATION) at 03:35

## 2024-09-05 NOTE — PROGRESS NOTE ADULT - SUBJECTIVE AND OBJECTIVE BOX
CC:  Follow up GOC , Symptoms    OVERNIGHT EVENTS:  no reported events     Present Symptoms:   Dyspnea:   Yes  Nausea/Vomiting:  No   Anxiety/ Agitation No     Depression:    Unable to assess  Fatigue:  Yes     Loss of appetite:   NA   Constipation: Not Reported    Pain:No Signs            Location            Duration            Character            Severity            Factors            Effect    Pain AD Score:  [email or web address]/painad.pdf (press ctrl + left click to view)    Review of Systems: Reviewed as above  Further ROS unable to be  obtain due to poor mentation         MEDICATIONS  (STANDING):  albuterol/ipratropium for Nebulization 3 milliLiter(s) Nebulizer every 6 hours  chlorhexidine 2% Cloths 1 Application(s) Topical daily  nystatin Powder 1 Application(s) Topical two times a day  pantoprazole  Injectable 40 milliGRAM(s) IV Push every 12 hours  sodium chloride 0.9%. 1000 milliLiter(s) (30 mL/Hr) IV Continuous <Continuous>    MEDICATIONS  (PRN):  acetylcysteine 20%  Inhalation 4 milliLiter(s) Inhalation every 6 hours PRN Thick secretions  artificial  tears Solution 1 Drop(s) Both EYES every 6 hours PRN Dry Eyes  bisacodyl Suppository 10 milliGRAM(s) Rectal daily PRN Constipation  glycopyrrolate Injectable 0.2 milliGRAM(s) IV Push every 6 hours PRN secretions  hydrALAZINE Injectable 10 milliGRAM(s) IV Push every 2 hours PRN SBP>160  LORazepam   Injectable 1 milliGRAM(s) IV Push every 4 hours PRN Anxiety  morphine  - Injectable 2 milliGRAM(s) IV Push every 4 hours PRN Pain and or Dyspnea      PHYSICAL EXAM:    Vital Signs Last 24 Hrs  T(C): 37.1 (05 Sep 2024 08:15), Max: 37.1 (04 Sep 2024 20:00)  T(F): 98.8 (05 Sep 2024 08:15), Max: 98.8 (05 Sep 2024 08:15)  HR: 93 (05 Sep 2024 08:49) (77 - 96)  BP: 141/67 (05 Sep 2024 08:15) (126/63 - 164/73)  BP(mean): --  RR: 18 (05 Sep 2024 08:15) (16 - 19)  SpO2: 95% (05 Sep 2024 08:49) (91% - 99%)    Parameters below as of 05 Sep 2024 08:49  Patient On (Oxygen Delivery Method): nasal cannula, 3L      Karnofsky: 10 %  General:  Elderly man appears little dypsneic       HEENT:  NCAT        Lungs: little labored - + secretions  CV:  RR  GI:  soft NTND  MSK: weak bb  Skin:  warm/dry  Neuro eyes closed, non verbal, does not follow commands  Psych na    LABS:                          9.3    13.85 )-----------( 190      ( 05 Sep 2024 05:44 )             30.7     09-05    144  |  107  |  30.3<H>  ----------------------------<  103<H>  4.0   |  20.0<L>  |  1.18    Ca    8.9      05 Sep 2024 05:44  Phos  2.7     09-04  Mg     2.0     09-04        Urinalysis Basic - ( 05 Sep 2024 05:44 )    Color: x / Appearance: x / SG: x / pH: x  Gluc: 103 mg/dL / Ketone: x  / Bili: x / Urobili: x   Blood: x / Protein: x / Nitrite: x   Leuk Esterase: x / RBC: x / WBC x   Sq Epi: x / Non Sq Epi: x / Bacteria: x      I&O's Summary    04 Sep 2024 07:01  -  05 Sep 2024 07:00  --------------------------------------------------------  IN: 600 mL / OUT: 950 mL / NET: -350 mL        RADIOLOGY & ADDITIONAL STUDIES:

## 2024-09-05 NOTE — PROGRESS NOTE ADULT - ASSESSMENT
89[facility]  [facility]  h[place]N, HLD admitted with basilar occlusion, s/p revascularization complicated by respiratory failure needing vent support    CVA/ Basilar Occlusion  patient on comfort care  d/c asa suppository     Acute Respiratory Failure  PNA  extubated on nc  s/p IV abx    Dyspnea   Morphine 2mg IVP q4hr PRN    Secretions   Robinul 0.4mg IVP q6hr PRN    Dysphagia  Aspiration precautions  SLP eval - NPO  NO PEG    Debility  Assist with care  Turn/reposition  Safety precautions    Encounter for Palliative Care  Palliative Care consulted to assist with GOC  Kyree Elena is the reported HCP. form in chart    See GOC note above for details.   1.  NO PEG - family agrees to Comfort/pleasure feeds  2. Comfort Care   3. Agreeable To transfer to inpatient hospice at [facility] hospice    Medications added for symptom management as above  Updated daughter Cari as well as requested by kyree Elena

## 2024-09-05 NOTE — PROGRESS NOTE ADULT - TIME BILLING
Time spent with review of chart documents, labs, imaging. Direct patient assessment,  formulation of care plan. Discussion with  Interdisciplinary  team  Dr. Holley, SLP FATEMEH Velazquez, [facility] PA
Time spent with review of chart documents, labs, imaging. Direct patient assessment,  formulation of care plan. Discussion with  Interdisciplinary  team  STEPHANIE Lovell  with an additional  ACP  of 30  minutes This time is exclusive of the encounter
Time spent with review of chart documents, labs, imaging. Direct patient assessment,  formulation of care plan. Discussion with  Interdisciplinary  team STEPHANIE Blanton   with an additional  ACP  of 16     minutes This time is exclusive of the encounter
Time spent reviewing the chart documentation, reviewing labs and imaging studies, evaluating the patient, discussing the plan of care with the consultants & medical team, and documenting.
stroke

## 2024-09-05 NOTE — PROGRESS NOTE ADULT - ASSESSMENT
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 Transferred to [facility] NICU and underwent TICI 2b thrombectomy and L vert angioplasty 08/21/24. Post CT concerning for possible hemorrhage, subsequently neg (likely artifact vs contrast). Extubated to HFNC > NC. DNR/DNI but not yet comfort. Palliative on board. Transferred to Medicine. After GOC discussions by Palliative family now opting for comfort measures with hope for inpt hospice with [facility]     Acute CVA   2/2 basilar occlusion s/p TICI 2B revascularization and L-vertebral artery angioplasty on 8/21  Started on  mg TN. Now dc'd  Aspiration precautions including HOB elevation   NPO due to concern for dysphagia/aspiration  Now under comort measures ONLY    Dysphagia due to recent CVA  Remains NPO given concern for ongoing aspiration   Family does not want NGT as per discussion w/ ICU. No PEG either. Now comfort  PPI bid     Leukocytosis suspect due to ongoing aspiration   s/p course of ertepenem for ESBL proteus PNA    Normocytic anemia   Likely of chronic dx     HARINDER   Resolved  Continue Thomas for now, comfort given prognosis  Avoid nephrotoxic meds or renally dose if needed    AF   Currently rate controlled   dc Tele     s/p ESBL Proteus PNA  Sputum cultures grew ESBL proteus in ETT cultures on 08/25  Completed full course of ertapenem     New HFrEF   Clinically euvolemic at this time     HTN  Avoid CCBs in setting of rEF    VTE ppx: SCDs   Dispo:  Inpt hospice pending acceptance to [facility]

## 2024-09-05 NOTE — PROGRESS NOTE ADULT - WHAT MATTERS MOST
Brandyn Paniagua is here today for Physical    Concerns/symptoms: None   Medications: medications verified and updated  Refills needed today? No     Tobacco history: verified  Advanced Directives: No not on file, forms/info. given to patient.  BP greater than 140/90? No    Patient would like communication of their results via:    Cell Phone:   Telephone Information:   Mobile [phone]     Okay to leave a message containing results? Yes  Preferred language:  English.    Health Maintenance Due   Topic Date Due   • Influenza Vaccine (1) 09/01/2017      Patient is up to date, no discussion needed.    
Follows his father's wishes
QOL

## 2024-09-05 NOTE — PROGRESS NOTE ADULT - SUBJECTIVE AND OBJECTIVE BOX
[facility] Division of Hospital Medicine    Chief Complaint:  CVA    SUBJECTIVE / OVERNIGHT EVENTS:  Pt examined laying in bed  Waxing/waning mentation  Now comfort measures after Palliative d/w family  ROS not obtained 2/2 mental status       MEDICATIONS  (STANDING):  chlorhexidine 2% Cloths 1 Application(s) Topical daily  nystatin Powder 1 Application(s) Topical two times a day  pantoprazole  Injectable 40 milliGRAM(s) IV Push every 12 hours  sodium chloride 0.9%. 1000 milliLiter(s) (30 mL/Hr) IV Continuous <Continuous>    MEDICATIONS  (PRN):  acetylcysteine 20%  Inhalation 4 milliLiter(s) Inhalation every 6 hours PRN Thick secretions  artificial  tears Solution 1 Drop(s) Both EYES every 6 hours PRN Dry Eyes  bisacodyl Suppository 10 milliGRAM(s) Rectal daily PRN Constipation  glycopyrrolate Injectable 0.2 milliGRAM(s) IV Push every 6 hours PRN secretions  hydrALAZINE Injectable 10 milliGRAM(s) IV Push every 2 hours PRN SBP>160  LORazepam   Injectable 1 milliGRAM(s) IV Push every 4 hours PRN Anxiety  morphine  - Injectable 2 milliGRAM(s) IV Push every 4 hours PRN Pain and or Dyspnea      PHYSICAL EXAM:  Vital Signs Last 24 Hrs  T(C): 37.1 (05 Sep 2024 08:15), Max: 37.1 (04 Sep 2024 20:00)  T(F): 98.8 (05 Sep 2024 08:15), Max: 98.8 (05 Sep 2024 08:15)  HR: 93 (05 Sep 2024 08:49) (77 - 96)  BP: 141/67 (05 Sep 2024 08:15) (126/63 - 164/73)  BP(mean): --  RR: 18 (05 Sep 2024 08:15) (18 - 19)  SpO2: 95% (05 Sep 2024 08:49) (91% - 99%)    Parameters below as of 05 Sep 2024 08:49  Patient On (Oxygen Delivery Method): nasal cannula, 3L          CONSTITUTIONAL: elderly male laying in bed  ENMT: Dry oral mucosa, transmitted pharyngeal sounds   RESPIRATORY: Shallow BS b/l , b/l rhonchi appreciated  CARDIOVASCULAR: Regular rate and rhythm, normal S1 and S2, no murmur/rub/gallop; No lower extremity edema; Peripheral pulses are 2+ bilaterally  ABDOMEN: Nontender to palpation, normoactive bowel sounds, no rebound/guarding; No hepatosplenomegaly  MUSCLOSKELETAL:   no clubbing or cyanosis of digits; no joint swelling or tenderness to palpation  PSYCH: Awake   NEUROLOGY:  Facial droop RUE 0/5, LUE (prox) 3/5  SKIN: No rashes; no palpable lesions    LABS:                        9.6    12.48 )-----------( 231      ( 04 Sep 2024 05:52 )             30.6     09-04    141  |  105  |  30.1<H>  ----------------------------<  103<H>  4.1   |  21.0<L>  |  1.04    Ca    8.8      04 Sep 2024 05:52  Phos  2.7     09-04  Mg     2.0     09-04            Urinalysis Basic - ( 04 Sep 2024 05:52 )    Color: x / Appearance: x / SG: x / pH: x  Gluc: 103 mg/dL / Ketone: x  / Bili: x / Urobili: x   Blood: x / Protein: x / Nitrite: x   Leuk Esterase: x / RBC: x / WBC x   Sq Epi: x / Non Sq Epi: x / Bacteria: x        CAPILLARY BLOOD GLUCOSE      POCT Blood Glucose.: 102 mg/dL (04 Sep 2024 06:33)        MICRO:        RADIOLOGY & ADDITIONAL TESTS:  8/22/24 MRI bran  IMPRESSION:  Acute bilateral cerebellar and pontine infarcts as described above.   Susceptibility artifact in the left ventral mikaela suspicious for   hemorrhagic conversion for which a component of was seen on the recent   head CT. Additional more diffuse high signal in the mikaela more likely   contrast staining. Continual follow-up recommended.  No large hemorrhage, vasogenic edema or infarct in the supratentorial   brain parenchyma. Chronic lacunar infarcts in the bilateral corona   radiata and moderate severity chronic microvascular changes.      TTE    1. Left ventricular cavity is normal in size. Left ventricular systolic function is moderately decreased with an ejection fraction of 37 % by Kern's method of disks.   2. Multiple segmental abnormalities exist. See findings.   3. The left ventricular diastolic function is indeterminate.   4. Normal right ventricular cavity size and normal right ventricular systolic function.   5. Left atrium is mildly dilated.   6. The right atrium is normal in size.   7. Moderate mitral regurgitation.   8. Structurally normal mitral valve with normal leaflet excursion.   9. Mild tricuspid regurgitation.  10. Estimated pulmonary artery systolic pressure is 34 mmHg.  11. Lipomatous interatrial septal hypertrophy present.  12. Mild left ventricular hypertrophy.  13. There is mild calcification of the mitral valve annulus.  14. There is no evidence of a left ventricular thrombus.

## 2024-09-05 NOTE — PROGRESS NOTE ADULT - CONVERSATION DETAILS
Informed son SLP rec NPO - unable to even do exam  Ulices struggling with what he feels his father would want.    Discussed the risks/benefits of PEG    Ulices leaning towards no PEG but want to confirm with family
Updated son on father's condition - no improvement, little dyspneic   Ulices states after discussing with his 2 other siblings they are all in agreement with NO PEG.  Discussed comfort/pleasure feeds - understanding risks of aspiration but promoting QOL.  Ulices in agreement  We also discussed comfort care - plan whereby focus is transitioned to pain and symptom management and minimizing interventions such as blood work, vitals, imaging to avoid further burden to the patient. Informed the use of medications to alleviate pain and suffering for which he was in agreement.     Discussed hospice inpatient unit given father's symptoms

## 2024-09-06 ENCOUNTER — TRANSCRIPTION ENCOUNTER (OUTPATIENT)
Age: 89
End: 2024-09-06

## 2024-09-06 VITALS
RESPIRATION RATE: 18 BRPM | DIASTOLIC BLOOD PRESSURE: 73 MMHG | SYSTOLIC BLOOD PRESSURE: 155 MMHG | HEART RATE: 96 BPM | OXYGEN SATURATION: 93 %

## 2024-09-06 PROCEDURE — 99239 HOSP IP/OBS DSCHRG MGMT >30: CPT

## 2024-09-06 PROCEDURE — 99232 SBSQ HOSP IP/OBS MODERATE 35: CPT

## 2024-09-06 RX ORDER — LORAZEPAM 4 MG/ML
1 INJECTION INTRAMUSCULAR; INTRAVENOUS
Qty: 0 | Refills: 0 | DISCHARGE
Start: 2024-09-06

## 2024-09-06 RX ORDER — FERROUS SULFATE 325(65) MG
325 TABLET ORAL
Refills: 0 | DISCHARGE

## 2024-09-06 RX ORDER — PANTOPRAZOLE SODIUM 40 MG
1 TABLET, DELAYED RELEASE (ENTERIC COATED) ORAL
Refills: 0 | DISCHARGE

## 2024-09-06 RX ORDER — TAMSULOSIN HYDROCHLORIDE 0.4 MG/1
1 CAPSULE ORAL
Refills: 0 | DISCHARGE

## 2024-09-06 RX ORDER — GLYCOPYRROLATE 0.2 MG/ML
0.2 INJECTION INTRAMUSCULAR; INTRAVENOUS
Qty: 1 | Refills: 0
Start: 2024-09-06

## 2024-09-06 RX ORDER — ASPIRIN 81 MG
1 TABLET, DELAYED RELEASE (ENTERIC COATED) ORAL
Refills: 0 | DISCHARGE

## 2024-09-06 RX ORDER — SUCRALFATE 1 G/10ML
1 SUSPENSION ORAL
Refills: 0 | DISCHARGE

## 2024-09-06 RX ORDER — AMLODIPINE BESYLATE 10 MG/1
1 TABLET ORAL
Refills: 0 | DISCHARGE

## 2024-09-06 RX ORDER — POVIDONE, PROPYLENE GLYCOL 6.8; 3 MG/ML; MG/ML
1 LIQUID OPHTHALMIC
Qty: 0 | Refills: 0 | DISCHARGE
Start: 2024-09-06

## 2024-09-06 RX ADMIN — Medication 2 MILLIGRAM(S): at 10:40

## 2024-09-06 RX ADMIN — GLYCOPYRROLATE 0.2 MILLIGRAM(S): 0.2 INJECTION INTRAMUSCULAR; INTRAVENOUS at 05:24

## 2024-09-06 RX ADMIN — SODIUM CHLORIDE 30 MILLILITER(S): 9 INJECTION INTRAMUSCULAR; INTRAVENOUS; SUBCUTANEOUS at 21:03

## 2024-09-06 RX ADMIN — Medication 2 MILLIGRAM(S): at 15:26

## 2024-09-06 RX ADMIN — Medication 40 MILLIGRAM(S): at 15:32

## 2024-09-06 RX ADMIN — Medication 1 APPLICATION(S): at 15:33

## 2024-09-06 RX ADMIN — SODIUM CHLORIDE 30 MILLILITER(S): 9 INJECTION INTRAMUSCULAR; INTRAVENOUS; SUBCUTANEOUS at 05:25

## 2024-09-06 RX ADMIN — Medication 2 MILLIGRAM(S): at 21:25

## 2024-09-06 RX ADMIN — Medication 2 MILLIGRAM(S): at 09:47

## 2024-09-06 RX ADMIN — LORAZEPAM 1 MILLIGRAM(S): 4 INJECTION INTRAMUSCULAR; INTRAVENOUS at 13:07

## 2024-09-06 RX ADMIN — SODIUM CHLORIDE 30 MILLILITER(S): 9 INJECTION INTRAMUSCULAR; INTRAVENOUS; SUBCUTANEOUS at 15:27

## 2024-09-06 RX ADMIN — GLYCOPYRROLATE 0.2 MILLIGRAM(S): 0.2 INJECTION INTRAMUSCULAR; INTRAVENOUS at 15:26

## 2024-09-06 RX ADMIN — Medication 2 MILLIGRAM(S): at 21:03

## 2024-09-06 RX ADMIN — Medication 40 MILLIGRAM(S): at 05:24

## 2024-09-06 RX ADMIN — Medication 1 APPLICATION(S): at 05:24

## 2024-09-06 RX ADMIN — Medication 2 MILLIGRAM(S): at 16:03

## 2024-09-06 NOTE — CHART NOTE - NSCHARTNOTESELECT_GEN_ALL_CORE
Neurointerventional Surgery Post Procedure Note/Event Note
Nutrition Services
Transfer Note
Neurointerventional Surgery Pre Procedure Note/Event Note
Nutrition Services
Nutrition Services

## 2024-09-06 NOTE — PROGRESS NOTE ADULT - PROVIDER SPECIALTY LIST ADULT
NSICU
Palliative Care
Internal Medicine
Intervent Radiology
NSICU
NSICU
Neurology
Palliative Care
Palliative Care
NSICU
NSICU
Neurology
Neurology
Palliative Care
Palliative Care
Internal Medicine
Palliative Care
Hospitalist
NSICU
Cardiology
NSICU
Palliative Care
NSICU

## 2024-09-06 NOTE — DISCHARGE NOTE PROVIDER - ATTENDING DISCHARGE PHYSICAL EXAMINATION:
CONSTITUTIONAL: elderly male laying in bed  ENMT: Dry oral mucosa, transmitted pharyngeal sounds   RESPIRATORY: Shallow BS b/l , b/l rhonchi appreciated  CARDIOVASCULAR: Regular rate and rhythm, normal S1 and S2, no murmur/rub/gallop; No lower extremity edema; Peripheral pulses are 2+ bilaterally  ABDOMEN: Nontender to palpation, normoactive bowel sounds, no rebound/guarding; No hepatosplenomegaly  MUSCLOSKELETAL:   no clubbing or cyanosis of digits; no joint swelling or tenderness to palpation  PSYCH: Intermittently opens eyes   NEUROLOGY:  Facial droop,  Unable to assess   SKIN: No rashes; no palpable lesions

## 2024-09-06 NOTE — PROGRESS NOTE ADULT - REASON FOR ADMISSION
Stroke

## 2024-09-06 NOTE — PROGRESS NOTE ADULT - ASSESSMENT
89yr man  from [facility]  hx HTN, HLD admitted with basilar occlusion, s/p revascularization complicated by respiratory failure needing vent support    CVA/ Basilar Occlusion  patient on comfort care  d/c asa suppository     Acute Respiratory Failure  PNA  extubated on nc  s/p IV abx    Dyspnea   Morphine 2mg IVP q4hr PRN    Secretions   Robinul 0.4mg IVP q6hr PRN    Dysphagia  Aspiration precautions  SLP eval - NPO  NO PEG    Debility  Assist with care  Turn/reposition  Safety precautions    Encounter for Palliative Care  Palliative Care consulted to assist with GOC  Son Ulices is the reported HCP. form in chart    See GOC note above for details.   1.  NO PEG - family agrees to Comfort/pleasure feeds  2. Comfort Care   3. Agreeable To transfer to inpatient hospice at [facility] - awaiting approval    Medications  for symptom management as above  If taking multipe

## 2024-09-06 NOTE — PROGRESS NOTE ADULT - SUBJECTIVE AND OBJECTIVE BOX
CC:  Follow up GOC , Symptoms    OVERNIGHT EVENTS:  no new  events     Present Symptoms:   Dyspnea:  intermittent dyspnea  Nausea/Vomiting:  No  Anxiety/ Agitation No     Depression:  Unable to assess  Fatigue:    Unable  to assess  Loss of appetite:  Yes    Constipation: Not Reported    + Secretions  Pain:  no sign            Location            Duration            Character            Severity            Factors            Effect      Pain AD Score:  [email or web address]/[email or web address] (press ctrl + left click to view)    Review of Systems: Reviewed as above  All others negative  Further ROS unable limited to  obtain due to poor mentation   historian      MEDICATIONS  (STANDING):  chlorhexidine 2% Cloths 1 Application(s) Topical daily  nystatin Powder 1 Application(s) Topical two times a day  pantoprazole  Injectable 40 milliGRAM(s) IV Push every 12 hours  sodium chloride 0.9%. 1000 milliLiter(s) (30 mL/Hr) IV Continuous <Continuous>    MEDICATIONS  (PRN):  acetylcysteine 20%  Inhalation 4 milliLiter(s) Inhalation every 6 hours PRN Thick secretions  artificial  tears Solution 1 Drop(s) Both EYES every 6 hours PRN Dry Eyes  bisacodyl Suppository 10 milliGRAM(s) Rectal daily PRN Constipation  glycopyrrolate Injectable 0.2 milliGRAM(s) IV Push every 6 hours PRN secretions  hydrALAZINE Injectable 10 milliGRAM(s) IV Push every 2 hours PRN SBP>160  LORazepam   Injectable 1 milliGRAM(s) IV Push every 4 hours PRN Anxiety  morphine  - Injectable 2 milliGRAM(s) IV Push every 4 hours PRN Pain and or Dyspnea      PHYSICAL EXAM:    Vital Signs Last 24 Hrs  T(C): 37.3 (06 Sep 2024 08:29), Max: 37.3 (06 Sep 2024 08:29)  T(F): 99.1 (06 Sep 2024 08:29), Max: 99.1 (06 Sep 2024 08:29)  HR: 96 (06 Sep 2024 08:29) (96 - 96)  BP: 116/75 (06 Sep 2024 08:29) (116/75 - 116/75)  BP(mean): --  RR: 20 (06 Sep 2024 08:29) (20 - 20)  SpO2: 93% (06 Sep 2024 08:29) (93% - 93%)    Parameters below as of 06 Sep 2024 08:29  Patient On (Oxygen Delivery Method): nasal cannula  O2 Flow (L/min): 2    Karnofsky:10  %  General:  Elderly man NAD      HEENT:  NCAT  eyes opens briefly on exam  Lungs: comfortable  non labored  CV:  RR  GI:  soft NTND  MSK: weak bb  Skin:  warm/dry  Neuro   non verbal, weak    LABS:                          9.3    13.85 )-----------( 190      ( 05 Sep 2024 05:44 )             30.7     09-05    144  |  107  |  30.3<H>  ----------------------------<  103<H>  4.0   |  20.0<L>  |  1.18    Ca    8.9      05 Sep 2024 05:44        Urinalysis Basic - ( 05 Sep 2024 05:44 )    Color: x / Appearance: x / SG: x / pH: x  Gluc: 103 mg/dL / Ketone: x  / Bili: x / Urobili: x   Blood: x / Protein: x / Nitrite: x   Leuk Esterase: x / RBC: x / WBC x   Sq Epi: x / Non Sq Epi: x / Bacteria: x      I&O's Summary    05 Sep 2024 07:01  -  06 Sep 2024 07:00  --------------------------------------------------------  IN: 360 mL / OUT: 500 mL / NET: -140 mL

## 2024-09-06 NOTE — PROGRESS NOTE ADULT - NSPROGADDITIONALINFOA_GEN_ALL_CORE
Time spent with review of chart documents, labs, imaging. Direct patient assessment,  formulation of care plan. Discussion with  Interdisciplinary  team  CEZAR Lau SW
Time spent with review of chart documents, labs, imaging. Direct patient assessment,  formulation of care plan. Discussion with  Interdisciplinary  team  Dr. Treviño RN, PA
Time spent with review of chart documents, labs, imaging. Direct patient assessment,  formulation of care plan. Discussion with  Interdisciplinary  team    TRA William

## 2024-09-06 NOTE — DISCHARGE NOTE PROVIDER - HOSPITAL COURSE
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 Transferred to [facility] NICU and underwent TICI 2b thrombectomy and L vert angioplasty 08/21/24. Post CT concerning for possible hemorrhage, subsequently neg (likely artifact vs contrast). Extubated to HFNC > NC. DNR/DNI. Unfortunately has not been able to recover airway protection and was intermittently dyspneic with ongoing aspiraions. Palliative discussions with family and given liekly terminal prognosis, decision to proceed with comfort care with hospice made.

## 2024-09-06 NOTE — DISCHARGE NOTE PROVIDER - NSDCMRMEDTOKEN_GEN_ALL_CORE_FT
bisacodyl 10 mg rectal suppository: 1 suppository(ies) rectal once a day As needed Constipation  glycopyrrolate 0.2 mg/mL injectable solution: 0.2 milligram(s) intravenously every 6 hours  LORazepam 1 mg/mL-NaCl 0.9% intravenous solution: 1 milliliter(s) intravenous every 4 hours as needed for Anxiety MDD 6 mg  morphine 2 mg/mL preservative-free injectable solution: 2 milligram(s) intravenously every 3 hours MDD: 16 mg  ocular lubricant ophthalmic solution: 1 drop(s) to each affected eye every 6 hours As needed Dry Eyes

## 2024-09-06 NOTE — PROGRESS NOTE ADULT - NUTRITIONAL ASSESSMENT
This patient has been assessed with a concern for Malnutrition and has been determined to have a diagnosis/diagnoses of Severe protein-calorie malnutrition.    This patient is being managed with:   Diet NPO-  Entered: Sep  2 2024  4:17PM  
This patient has been assessed with a concern for Malnutrition and has been determined to have a diagnosis/diagnoses of Severe protein-calorie malnutrition.    This patient is being managed with:   Diet Pureed-  Moderately Thick Liquids (MODTHICKLIQS)  Entered: Sep  6 2024  8:36AM  
This patient has been assessed with a concern for Malnutrition and has been determined to have a diagnosis/diagnoses of Severe protein-calorie malnutrition.    This patient is being managed with:   Diet NPO-  Entered: Sep  2 2024  4:17PM

## 2024-09-06 NOTE — CHART NOTE - NSCHARTNOTEFT_GEN_A_CORE
Nutrition Note: Pt currently moved to comfort care. Pallative following for GOC. Per GOC meeting on 9/5 with family and pallative, family decided they would not like pt to receive a PEG and would like him to receive comfort/pleasure feeds only. Pt currently awaiting D/C to hospice. No nutrition interventions at this time. RD to remain available if plans change.

## 2024-09-29 PROCEDURE — 94003 VENT MGMT INPAT SUBQ DAY: CPT

## 2024-09-29 PROCEDURE — 85730 THROMBOPLASTIN TIME PARTIAL: CPT

## 2024-09-29 PROCEDURE — 87077 CULTURE AEROBIC IDENTIFY: CPT

## 2024-09-29 PROCEDURE — 82962 GLUCOSE BLOOD TEST: CPT

## 2024-09-29 PROCEDURE — 84132 ASSAY OF SERUM POTASSIUM: CPT

## 2024-09-29 PROCEDURE — 85014 HEMATOCRIT: CPT

## 2024-09-29 PROCEDURE — 84466 ASSAY OF TRANSFERRIN: CPT

## 2024-09-29 PROCEDURE — 84295 ASSAY OF SERUM SODIUM: CPT

## 2024-09-29 PROCEDURE — 87640 STAPH A DNA AMP PROBE: CPT

## 2024-09-29 PROCEDURE — 82728 ASSAY OF FERRITIN: CPT

## 2024-09-29 PROCEDURE — 80048 BASIC METABOLIC PNL TOTAL CA: CPT

## 2024-09-29 PROCEDURE — C1757: CPT

## 2024-09-29 PROCEDURE — 71045 X-RAY EXAM CHEST 1 VIEW: CPT

## 2024-09-29 PROCEDURE — 82553 CREATINE MB FRACTION: CPT

## 2024-09-29 PROCEDURE — 84443 ASSAY THYROID STIM HORMONE: CPT

## 2024-09-29 PROCEDURE — C1887: CPT

## 2024-09-29 PROCEDURE — 84100 ASSAY OF PHOSPHORUS: CPT

## 2024-09-29 PROCEDURE — 76937 US GUIDE VASCULAR ACCESS: CPT

## 2024-09-29 PROCEDURE — 80053 COMPREHEN METABOLIC PANEL: CPT

## 2024-09-29 PROCEDURE — C1894: CPT

## 2024-09-29 PROCEDURE — 81001 URINALYSIS AUTO W/SCOPE: CPT

## 2024-09-29 PROCEDURE — 83605 ASSAY OF LACTIC ACID: CPT

## 2024-09-29 PROCEDURE — 86901 BLOOD TYPING SEROLOGIC RH(D): CPT

## 2024-09-29 PROCEDURE — 85025 COMPLETE CBC W/AUTO DIFF WBC: CPT

## 2024-09-29 PROCEDURE — 87641 MR-STAPH DNA AMP PROBE: CPT

## 2024-09-29 PROCEDURE — 86900 BLOOD TYPING SEROLOGIC ABO: CPT

## 2024-09-29 PROCEDURE — 87070 CULTURE OTHR SPECIMN AEROBIC: CPT

## 2024-09-29 PROCEDURE — 82550 ASSAY OF CK (CPK): CPT

## 2024-09-29 PROCEDURE — 85045 AUTOMATED RETICULOCYTE COUNT: CPT

## 2024-09-29 PROCEDURE — 85018 HEMOGLOBIN: CPT

## 2024-09-29 PROCEDURE — 82947 ASSAY GLUCOSE BLOOD QUANT: CPT

## 2024-09-29 PROCEDURE — 87040 BLOOD CULTURE FOR BACTERIA: CPT

## 2024-09-29 PROCEDURE — 70450 CT HEAD/BRAIN W/O DYE: CPT | Mod: MC

## 2024-09-29 PROCEDURE — 83540 ASSAY OF IRON: CPT

## 2024-09-29 PROCEDURE — 84145 PROCALCITONIN (PCT): CPT

## 2024-09-29 PROCEDURE — 93005 ELECTROCARDIOGRAM TRACING: CPT

## 2024-09-29 PROCEDURE — 83550 IRON BINDING TEST: CPT

## 2024-09-29 PROCEDURE — 82607 VITAMIN B-12: CPT

## 2024-09-29 PROCEDURE — 85610 PROTHROMBIN TIME: CPT

## 2024-09-29 PROCEDURE — 70551 MRI BRAIN STEM W/O DYE: CPT | Mod: MC

## 2024-09-29 PROCEDURE — 85027 COMPLETE CBC AUTOMATED: CPT

## 2024-09-29 PROCEDURE — 84484 ASSAY OF TROPONIN QUANT: CPT

## 2024-09-29 PROCEDURE — 94002 VENT MGMT INPAT INIT DAY: CPT

## 2024-09-29 PROCEDURE — 80061 LIPID PANEL: CPT

## 2024-09-29 PROCEDURE — 82803 BLOOD GASES ANY COMBINATION: CPT

## 2024-09-29 PROCEDURE — 80076 HEPATIC FUNCTION PANEL: CPT

## 2024-09-29 PROCEDURE — 87086 URINE CULTURE/COLONY COUNT: CPT

## 2024-09-29 PROCEDURE — 76380 CAT SCAN FOLLOW-UP STUDY: CPT

## 2024-09-29 PROCEDURE — C1769: CPT

## 2024-09-29 PROCEDURE — 82330 ASSAY OF CALCIUM: CPT

## 2024-09-29 PROCEDURE — 86850 RBC ANTIBODY SCREEN: CPT

## 2024-09-29 PROCEDURE — 82746 ASSAY OF FOLIC ACID SERUM: CPT

## 2024-09-29 PROCEDURE — 0225U NFCT DS DNA&RNA 21 SARSCOV2: CPT

## 2024-09-29 PROCEDURE — 36415 COLL VENOUS BLD VENIPUNCTURE: CPT

## 2024-09-29 PROCEDURE — 87205 SMEAR GRAM STAIN: CPT

## 2024-09-29 PROCEDURE — 94640 AIRWAY INHALATION TREATMENT: CPT

## 2024-09-29 PROCEDURE — 82435 ASSAY OF BLOOD CHLORIDE: CPT

## 2024-09-29 PROCEDURE — 83880 ASSAY OF NATRIURETIC PEPTIDE: CPT

## 2024-09-29 PROCEDURE — 83735 ASSAY OF MAGNESIUM: CPT

## 2024-09-29 PROCEDURE — C8929: CPT

## 2024-09-29 PROCEDURE — 36600 WITHDRAWAL OF ARTERIAL BLOOD: CPT

## 2024-09-29 PROCEDURE — 83036 HEMOGLOBIN GLYCOSYLATED A1C: CPT

## 2024-09-29 PROCEDURE — P9045: CPT

## 2024-09-29 PROCEDURE — 87186 SC STD MICRODIL/AGAR DIL: CPT

## 2025-06-23 NOTE — H&P ADULT - NSCORESITESY/N_GEN_A_CORE_RD
.Medication passed protocol.     Medication: chlorthalidone (THALITONE) 25 MG tablet  passed protocol.   Last office visit date: 4/14/25  Next appointment scheduled?: Yes    
No